# Patient Record
Sex: FEMALE | Race: BLACK OR AFRICAN AMERICAN | Employment: UNEMPLOYED | URBAN - METROPOLITAN AREA
[De-identification: names, ages, dates, MRNs, and addresses within clinical notes are randomized per-mention and may not be internally consistent; named-entity substitution may affect disease eponyms.]

---

## 2017-01-04 ENCOUNTER — OFFICE VISIT (OUTPATIENT)
Dept: FAMILY MEDICINE CLINIC | Age: 62
End: 2017-01-04

## 2017-01-04 ENCOUNTER — HOSPITAL ENCOUNTER (OUTPATIENT)
Dept: LAB | Age: 62
Discharge: HOME | End: 2017-01-04

## 2017-01-04 VITALS
TEMPERATURE: 97.8 F | HEIGHT: 61 IN | SYSTOLIC BLOOD PRESSURE: 119 MMHG | BODY MASS INDEX: 22.09 KG/M2 | DIASTOLIC BLOOD PRESSURE: 65 MMHG | WEIGHT: 117 LBS | HEART RATE: 76 BPM

## 2017-01-04 DIAGNOSIS — I10 ESSENTIAL HYPERTENSION: ICD-10-CM

## 2017-01-04 DIAGNOSIS — R73.9 ELEVATED BLOOD SUGAR: Primary | ICD-10-CM

## 2017-01-04 LAB
EST. AVERAGE GLUCOSE BLD GHB EST-MCNC: 240 MG/DL
GLUCOSE POC: NORMAL MG/DL
HBA1C MFR BLD: 10 % (ref 4.2–6.3)

## 2017-01-04 PROCEDURE — 83036 HEMOGLOBIN GLYCOSYLATED A1C: CPT

## 2017-01-04 NOTE — PATIENT INSTRUCTIONS
1. Your Insulin dose is increased to 54 units. Use 54 units of Levemir daily around the same time  2. Log in your glucose level and bring the chart on you next visit. Log in fasting glucose level for the several days, then log in glucose level after meal for several days and log in your blood glucose level before bedtime for several days.

## 2017-01-04 NOTE — PROGRESS NOTES
Carrie Jacinto is an 57 y.o. female  Who presents for follow up for diabetes and hypertension. The patient is a  [place] speaking. The patient's son is present during the examination and interprets the conversation. The patient brought her glucose log with fasting glucose in 140-200s. The patient's daughter-in-law is usually administers the insulin. The pt reports using Insulin not as scheduled (sometimes in the morning, sometimes in the afternoon). She checks her blood glucose randomly. The follows diabetic diet and doing OK overall. The pt has blood pressure cath at home, reports checking her blood pressure \" sometimes and does not remember the numbers\". The patient reports feeling OK. Allergies - reviewed:   No Known Allergies      Medications - reviewed:   Current Outpatient Prescriptions   Medication Sig    Insulin Syringe-Needle U-100 1 mL 31 gauge x 5/16 syrg Use to administer insulin as directed.  zolpidem (AMBIEN) 5 mg tablet TAKE ONE TABLET BY MOUTH NIGHTLY AS NEEDED FOR SLEEP**MAX DAILY AMOUNT: 5 MG**    meclizine (ANTIVERT) 25 mg tablet Take 1 Tab by mouth three (3) times daily as needed. For dizziness.  metFORMIN ER (GLUCOPHAGE XR) 500 mg tablet Take 2 Tabs by mouth daily.  lisinopril-hydrochlorothiazide (PRINZIDE, ZESTORETIC) 20-25 mg per tablet Take 2 Tabs by mouth daily.  omeprazole (PRILOSEC) 20 mg capsule Take 1 Cap by mouth daily.  HYDROcodone-acetaminophen (NORCO) 5-325 mg per tablet Take 1 Tab by mouth every four (4) hours as needed for Pain. Max Daily Amount: 6 Tabs.  naproxen (NAPROSYN) 500 mg tablet Take 1 Tab by mouth two (2) times daily (with meals).  ondansetron hcl (ZOFRAN, AS HYDROCHLORIDE,) 8 mg tablet Take 1 Tab by mouth every eight (8) hours as needed for Nausea.  insulin detemir (LEVEMIR) 100 unit/mL (3 mL) pen by SubCUTAneous route. Take 54 units daily at noon. No current facility-administered medications for this visit. Past Medical History - reviewed:  Past Medical History   Diagnosis Date    Diabetes ([facility] [address]     Hypertension          Past Surgical History - reviewed:   No past surgical history on file. Social History - reviewed:  Social History     Social History    Marital status:      Spouse name: N/A    Number of children: N/A    Years of education: N/A     Occupational History    Not on file. Social History Main Topics    Smoking status: Never Smoker    Smokeless tobacco: Not on file    Alcohol use No    Drug use: No    Sexual activity: Not on file     Other Topics Concern    Not on file     Social History Narrative         Family History - reviewed:  No family history on file. Immunizations - reviewed:   Immunization History   Administered Date(s) Administered    Influenza Vaccine 11/18/2010, 12/09/2011, 10/02/2012, 01/28/2014    Influenza Vaccine (Quad) PF 11/23/2015    Influenza Vaccine PF 01/21/2015         ROS  Review of Systems : negative unless highlighted  CONSTITUTIONAL: fevers. Chills. Anorexia. Weight loss. Weight gain. EYES: diplopia. Blurry vision. Visual changes  CARDIOVASCULAR: chest pain. palpitations  RESPIRATORY: dyspnea. Cough. Wheeze. : dysuria. Hematuria. Urgency. Itching. Burning. Discharge. NEURO: numbness. Tingling. Weakness. Physical Exam  Visit Vitals    /65 (BP 1 Location: Left arm, BP Patient Position: Sitting)    Pulse 76    Temp 97.8 °F (36.6 °C) (Oral)    Ht 5' 0.98\" (1.549 m)    Wt 117 lb (53.1 kg)    BMI 22.12 kg/m2       General appearance - NAD. Appropriately conversational    Eyes: EOMI. Sclera white. Respiratory - LCTAB. No wheeze/rale/rhonchi  Heart - Normal rate, regular rhythm. No m/r/r  Abdomen - Soft, non tender. Non distended. Neurological - No focal deficits. Speech normal.   Musculoskeletal - Normal ROM, Gait normal.    Extremities - No LE edema. Distal pulses intact  Skin - normal coloration and normal turgor. No cyanosis, no rash. Assessment/Plan  1. Diabetes mellitus  -Will check HgA1C    -The Levemir was increased to 54 units  -The pt was advised to log in the glucose numbers in the morning, before and after meals and before bedtime until next appointment    2. Hypertension  -Continue Prinzide 20-25 mg : 2 tablets daily        Follow-up Disposition:  Return in about 3 months (around 4/4/2017) for Diabetes follow up. I discussed the aforementioned diagnoses with the patient as well as the plan of care.      The patient was seen and discussed with Dr. Jorge Méndez (The attending physician)    Krystina Downs MD  Family Medicine Resident  PGY 1

## 2017-01-04 NOTE — MR AVS SNAPSHOT
Visit Information Date & Time Provider Department Dept. Phone Encounter #  
 1/4/2017 11:10 AM Broderick Kim MD [facility] [phone] [id number] Follow-up Instructions Return in about 3 months (around 4/4/2017) for Diabetes follow up. Upcoming Health Maintenance Date Due  
 EYE EXAM RETINAL OR DILATED Q1 6/5/1964 Pneumococcal 19-64 Medium Risk (1 of 1 - PPSV23) 6/5/1973 DTaP/Tdap/Td series (1 - Tdap) 6/5/1975 PAP AKA CERVICAL CYTOLOGY 6/5/1975 ZOSTER VACCINE AGE 60> 6/5/2014 FOBT Q 1 YEAR AGE 50-75 3/12/2015 FOOT EXAM Q1 8/28/2015 BREAST CANCER SCRN MAMMOGRAM 2/25/2016 INFLUENZA AGE 9 TO ADULT 8/1/2016 HEMOGLOBIN A1C Q6M 2/19/2017 MICROALBUMIN Q1 5/11/2017 LIPID PANEL Q1 8/19/2017 Allergies as of 1/4/2017  Review Complete On: 1/4/2017 By: Sierra Davis No Known Allergies Current Immunizations  Reviewed on 11/23/2015 Name Date Influenza Vaccine 1/28/2014 11:20 AM, 10/2/2012, 12/9/2011, 11/18/2010 Influenza Vaccine (Quad) PF 11/23/2015 Influenza Vaccine PF 1/21/2015 Not reviewed this visit You Were Diagnosed With   
  
 Codes Comments Elevated blood sugar    -  Primary ICD-10-CM: R73.9 ICD-9-CM: 790.29 Uncontrolled type 2 diabetes mellitus without complication, with long-term current use of insulin (HCC)     ICD-10-CM: E11.65, Z79.4 ICD-9-CM: 250.02, V58.67 Essential hypertension     ICD-10-CM: I10 
ICD-9-CM: 401.9 Vitals BP Pulse Temp Height(growth percentile) Weight(growth percentile) BMI  
 119/65 (BP 1 Location: Left arm, BP Patient Position: Sitting) 76 97.8 °F (36.6 °C) (Oral) 5' 0.98\" (1.549 m) 117 lb (53.1 kg) 22.12 kg/m2 OB Status Smoking Status Postmenopausal Never Smoker Vitals History BMI and BSA Data Body Mass Index Body Surface Area  
 22.12 kg/m 2 1.51 m 2 Preferred Pharmacy Pharmacy Name Phone [phone] [facility] [phone] Your Updated Medication List  
  
   
This list is accurate as of: 1/4/17 12:10 PM.  Always use your most recent med list.  
  
  
  
  
 HYDROcodone-acetaminophen 5-325 mg per tablet Commonly known as:  Benetta Pock Take 1 Tab by mouth every four (4) hours as needed for Pain. Max Daily Amount: 6 Tabs. insulin detemir 100 unit/mL (3 mL) Inpn Commonly known as:  LEVEMIR FLEXTOUCH  
by SubCUTAneous route. Take 54 units daily at noon. Insulin Syringe-Needle U-100 1 mL 31 gauge x 5/16 Syrg Use to administer insulin as directed. lisinopril-hydroCHLOROthiazide 20-25 mg per tablet Commonly known as:  Donnamarie Parrot Take 2 Tabs by mouth daily. meclizine 25 mg tablet Commonly known as:  ANTIVERT Take 1 Tab by mouth three (3) times daily as needed. For dizziness. metFORMIN  mg tablet Commonly known as:  GLUCOPHAGE XR Take 2 Tabs by mouth daily. naproxen 500 mg tablet Commonly known as:  NAPROSYN Take 1 Tab by mouth two (2) times daily (with meals). omeprazole 20 mg capsule Commonly known as:  PRILOSEC Take 1 Cap by mouth daily. ondansetron hcl 8 mg tablet Commonly known as:  ZOFRAN (AS HYDROCHLORIDE) Take 1 Tab by mouth every eight (8) hours as needed for Nausea. zolpidem 5 mg tablet Commonly known as:  AMBIEN  
TAKE ONE TABLET BY MOUTH NIGHTLY AS NEEDED FOR SLEEP**MAX DAILY AMOUNT: 5 MG** Follow-up Instructions Return in about 3 months (around 4/4/2017) for Diabetes follow up. To-Do List   
 01/04/2017 Lab:  HEMOGLOBIN A1C WITH EAG Patient Instructions 1. Your Insulin dose is increased to 54 units. Use 54 units of Levemir daily around the same time 2. Log in your glucose level and bring the chart on you next visit.  Log in fasting glucose level for the several days, then log in glucose level after meal for several days and log in your blood glucose level before bedtime for several days. Introducing [facility] & HEALTH SERVICES! [address] introduces [employer] patient portal. Now you can access parts of your medical record, email your doctor's office, and request medication refills online. 1. In your internet browser, go to [email or web address] 2. Click on the First Time User? Click Here link in the Sign In box. You will see the New Member Sign Up page. 3. Enter your Megadyne Access Code exactly as it appears below. You will not need to use this code after youve completed the sign-up process. If you do not sign up before the expiration date, you must request a new code. · Meg[employer]ne Access Code: [email or web address] Expires: 4/4/2017 12:01 PM 
 
4. Enter the last four digits of your Social Security Number (xxxx) and Date of Birth (mm/dd/yyyy) as indicated and click Submit. You will be taken to the next sign-up page. 5. Create a Megadyne ID. This will be your Megadyne login ID and cannot be changed, so think of one that is secure and easy to remember. 6. Create a Megadyne password. You can change your password at any time. 7. Enter your Password Reset Question and Answer. This can be used at a later time if you forget your password. 8. Enter your e-mail address. You will receive e-mail notification when new information is available in [address] 9. Click Sign Up. You can now view and download portions of your medical record. 10. Click the Download Summary menu link to download a portable copy of your medical information. If you have questions, please visit the Frequently Asked Questions section of the Megadyne website. Remember, Megadyne is NOT to be used for urgent needs. For medical emergencies, dial 911. Now available from your iPhone and Android! Please provide this summary of care documentation to your next provider. Your primary care clinician is listed as Millie Herrera. If you have any questions after today's visit, please call [phone].

## 2017-01-04 NOTE — PROGRESS NOTES
Coordination of Care  1. Have you been to the ER, urgent care clinic since your last visit? Hospitalized since your last visit? No    2. Have you seen or consulted any other health care providers outside of the [address] [place] [address] since your last visit? Include any pap smears or colon screening.  No    Medications  Medication Reconciliation Performed: no  Patient does not know need refills     Learning Assessment Complete? yes  Results for orders placed or performed in visit on 01/04/17   AMB POC GLUCOSE BLOOD, BY GLUCOSE MONITORING DEVICE   Result Value Ref Range    Glucose POC non fasting 167 mg/dL

## 2017-01-05 NOTE — PROGRESS NOTES
I saw and evaluated the patient, performing the key elements of the service. I discussed the findings, assessment and plan with the resident and agree with the resident's findings and plan as documented in the resident's note. Apparently with her daughter and son in law working it is difficult to give her insulin at any regular time. They will try to give as close to AM as possible. The patient is not able to give herself insulin.

## 2017-01-06 NOTE — PROGRESS NOTES
HgA1C improved since last time. Levemir was increased to 54 units, will recheck HgA1C on next visit.

## 2017-04-03 ENCOUNTER — CLINICAL SUPPORT (OUTPATIENT)
Dept: FAMILY MEDICINE CLINIC | Age: 62
End: 2017-04-03

## 2017-05-03 ENCOUNTER — OFFICE VISIT (OUTPATIENT)
Dept: FAMILY MEDICINE CLINIC | Age: 62
End: 2017-05-03

## 2017-05-03 VITALS
BODY MASS INDEX: 22.09 KG/M2 | HEART RATE: 73 BPM | DIASTOLIC BLOOD PRESSURE: 60 MMHG | TEMPERATURE: 98 F | HEIGHT: 61 IN | SYSTOLIC BLOOD PRESSURE: 123 MMHG | WEIGHT: 117 LBS

## 2017-05-03 DIAGNOSIS — I10 ESSENTIAL HYPERTENSION WITH GOAL BLOOD PRESSURE LESS THAN 140/90: ICD-10-CM

## 2017-05-03 DIAGNOSIS — Z79.4 CONTROLLED TYPE 2 DIABETES MELLITUS WITHOUT COMPLICATION, WITH LONG-TERM CURRENT USE OF INSULIN (HCC): ICD-10-CM

## 2017-05-03 DIAGNOSIS — E11.9 CONTROLLED TYPE 2 DIABETES MELLITUS WITHOUT COMPLICATION, WITH LONG-TERM CURRENT USE OF INSULIN (HCC): ICD-10-CM

## 2017-05-03 DIAGNOSIS — E16.2 HYPOGLYCEMIA: Primary | ICD-10-CM

## 2017-05-03 LAB — GLUCOSE POC: NORMAL MG/DL

## 2017-05-03 NOTE — PROGRESS NOTES
Coordination of Care  1. Have you been to the ER, urgent care clinic since your last visit? Hospitalized since your last visit? No    2. Have you seen or consulted any other health care providers outside of the [address] [place] [address] since your last visit? Include any pap smears or colon screening.  No    Medications  Medication Reconciliation Performed: yes  Patient does need refills     Learning Assessment Complete? yes  Results for orders placed or performed in visit on 05/03/17   AMB POC GLUCOSE BLOOD, BY GLUCOSE MONITORING DEVICE   Result Value Ref Range    Glucose  NF mg/dL

## 2017-05-03 NOTE — MR AVS SNAPSHOT
Visit Information Date & Time Provider Department Dept. Phone Encounter #  
 5/3/2017 10:30 AM Boris Candelario [facility] [phone] [id number] Follow-up Instructions Return in about 1 month (around 6/3/2017). Upcoming Health Maintenance Date Due  
 EYE EXAM RETINAL OR DILATED Q1 6/5/1964 Pneumococcal 19-64 Medium Risk (1 of 1 - PPSV23) 6/5/1973 DTaP/Tdap/Td series (1 - Tdap) 6/5/1975 PAP AKA CERVICAL CYTOLOGY 6/5/1975 FOBT Q 1 YEAR AGE 50-75 6/5/2004 ZOSTER VACCINE AGE 60> 6/5/2014 FOOT EXAM Q1 8/28/2015 BREAST CANCER SCRN MAMMOGRAM 2/25/2016 MICROALBUMIN Q1 5/11/2017 HEMOGLOBIN A1C Q6M 7/4/2017 INFLUENZA AGE 9 TO ADULT 8/1/2017 LIPID PANEL Q1 8/19/2017 Allergies as of 5/3/2017  Review Complete On: 5/3/2017 By: Ramesh Palomares MD  
 No Known Allergies Current Immunizations  Reviewed on 11/23/2015 Name Date Influenza Vaccine 1/28/2014 11:20 AM, 10/2/2012, 12/9/2011, 11/18/2010 Influenza Vaccine (Quad) PF 11/23/2015 Influenza Vaccine PF 1/21/2015 Not reviewed this visit You Were Diagnosed With   
  
 Codes Comments Controlled type 2 diabetes mellitus without complication, with long-term current use of insulin (HCC)    -  Primary ICD-10-CM: E11.9, Z79.4 ICD-9-CM: 250.00, V58.67 Essential hypertension with goal blood pressure less than 140/90     ICD-10-CM: I10 
ICD-9-CM: 401.9 Vitals BP Pulse Temp Height(growth percentile) Weight(growth percentile) BMI  
 123/60 (BP 1 Location: Left arm) 73 98 °F (36.7 °C) (Oral) 5' 0.98\" (1.549 m) 117 lb (53.1 kg) 22.12 kg/m2 OB Status Smoking Status Postmenopausal Never Smoker Vitals History BMI and BSA Data Body Mass Index Body Surface Area  
 22.12 kg/m 2 1.51 m 2 Preferred Pharmacy Pharmacy Name Phone [facility] [phone] [facility] [phone] Your Updated Medication List  
  
   
This list is accurate as of: 5/3/17 11:21 AM.  Always use your most recent med list.  
  
  
  
  
 HYDROcodone-acetaminophen 5-325 mg per tablet Commonly known as:  Milinda Copier Take 1 Tab by mouth every four (4) hours as needed for Pain. Max Daily Amount: 6 Tabs. insulin detemir 100 unit/mL (3 mL) Inpn Commonly known as:  LEVEMIR FLEXTOUCH  
by SubCUTAneous route. Take 54 units daily at noon. Insulin Syringe-Needle U-100 1 mL 31 gauge x 5/16 Syrg Use to administer insulin as directed. lisinopril-hydroCHLOROthiazide 20-25 mg per tablet Commonly known as:  Louretta  Take 2 Tabs by mouth daily. meclizine 25 mg tablet Commonly known as:  ANTIVERT Take 1 Tab by mouth three (3) times daily as needed. For dizziness. metFORMIN  mg tablet Commonly known as:  GLUCOPHAGE XR Take 2 Tabs by mouth daily. naproxen 500 mg tablet Commonly known as:  NAPROSYN Take 1 Tab by mouth two (2) times daily (with meals). omeprazole 20 mg capsule Commonly known as:  PRILOSEC Take 1 Cap by mouth daily. ondansetron hcl 8 mg tablet Commonly known as:  ZOFRAN (AS HYDROCHLORIDE) Take 1 Tab by mouth every eight (8) hours as needed for Nausea. zolpidem 5 mg tablet Commonly known as:  AMBIEN  
TAKE ONE TABLET BY MOUTH NIGHTLY AS NEEDED FOR SLEEP**MAX DAILY AMOUNT: 5 MG** We Performed the Following AMB POC GLUCOSE BLOOD, BY GLUCOSE MONITORING DEVICE [40241 CPT(R)] Follow-up Instructions Return in about 1 month (around 6/3/2017). Introducing [facility] & HEALTH SERVICES! [facility] introduces [employer] patient portal. Now you can access parts of your medical record, email your doctor's office, and request medication refills online. 1. In your internet browser, go to [email or web address] 2. Click on the First Time User? Click Here link in the Sign In box.  You will see the New Member Sign Up page. 3. Enter your [employer] Access Code exactly as it appears below. You will not need to use this code after youve completed the sign-up process. If you do not sign up before the expiration date, you must request a new code. · [employer] Access Code: [email or web address] Expires: 8/1/2017 11:20 AM 
 
4. Enter the last four digits of your Social Security Number (xxxx) and Date of Birth (mm/dd/yyyy) as indicated and click Submit. You will be taken to the next sign-up page. 5. Create a [employer] ID. This will be your [employer]Green login ID and cannot be changed, so think of one that is secure and easy to remember. 6. Create a [employer] password. You can change your password at any time. 7. Enter your Password Reset Question and Answer. This can be used at a later time if you forget your password. 8. Enter your e-mail address. You will receive e-mail notification when new information is available in [address] 9. Click Sign Up. You can now view and download portions of your medical record. 10. Click the Download Summary menu link to download a portable copy of your medical information. If you have questions, please visit the Frequently Asked Questions section of the [employer] website. Remember, [employer] is NOT to be used for urgent needs. For medical emergencies, dial 911. Now available from your iPhone and Android! Please provide this summary of care documentation to your next provider. Your primary care clinician is listed as Emil Puente. If you have any questions after today's visit, please call [phone].

## 2017-05-03 NOTE — PROGRESS NOTES
Subjective:     Karissa Jorgensen is a 57 y.o. female who presents for follow up of diabetes and hypertension. She is accompanied by her daughter who is very involved in her care and interprets for her. New concerns: Has had 2 episodes of hypoglycemia in the early AM just recently. May have been due to not eating as much in evening before. Once she fell into the floor while arising from a chair. No LOC but hit right side of head. Has not noticed any neurological deficits since but has had some headache. Patient Active Problem List   Diagnosis Code    Diabetes mellitus type 2, controlled ([facility] [address] E11.9    Illiteracy Z55.0    Dyspepsia R10.13    Osteoarthritis of right knee M17.11    Essential hypertension with goal blood pressure less than 140/90 I10       Current Outpatient Prescriptions   Medication Sig    Insulin Syringe-Needle U-100 1 mL 31 gauge x 5/16 syrg Use to administer insulin as directed.  zolpidem (AMBIEN) 5 mg tablet TAKE ONE TABLET BY MOUTH NIGHTLY AS NEEDED FOR SLEEP**MAX DAILY AMOUNT: 5 MG**    metFORMIN ER (GLUCOPHAGE XR) 500 mg tablet Take 2 Tabs by mouth daily.  lisinopril-hydrochlorothiazide (PRINZIDE, ZESTORETIC) 20-25 mg per tablet Take 2 Tabs by mouth daily.  omeprazole (PRILOSEC) 20 mg capsule Take 1 Cap by mouth daily.  insulin detemir (LEVEMIR) 100 unit/mL (3 mL) pen by SubCUTAneous route. Take 54 units daily at noon.  meclizine (ANTIVERT) 25 mg tablet Take 1 Tab by mouth three (3) times daily as needed. For dizziness.  HYDROcodone-acetaminophen (NORCO) 5-325 mg per tablet Take 1 Tab by mouth every four (4) hours as needed for Pain. Max Daily Amount: 6 Tabs.  naproxen (NAPROSYN) 500 mg tablet Take 1 Tab by mouth two (2) times daily (with meals).  ondansetron hcl (ZOFRAN, AS HYDROCHLORIDE,) 8 mg tablet Take 1 Tab by mouth every eight (8) hours as needed for Nausea. No current facility-administered medications for this visit.         No Known Allergies    Diet and Lifestyle: does not rigorously follow a diabetic diet      AM glucoses:  147 - 190  Post Prandial glucoses:  197 - 267  Evening glucoses:  not checking    BP readings outside office:  125/60    Cardiovascular ROS: taking medications as instructed, no medication side effects noted, no TIA's, no chest pain on exertion, no dyspnea on exertion, no swelling of ankles. Review of Systems, additional:  Pertinent items are noted in HPI. No past surgical history on file. No family history on file. Social History   Substance Use Topics    Smoking status: Never Smoker    Smokeless tobacco: Not on file    Alcohol use No          Objective:     Visit Vitals    /60 (BP 1 Location: Left arm)    Pulse 73    Temp 98 °F (36.7 °C) (Oral)    Ht 5' 0.98\" (1.549 m)    Wt 117 lb (53.1 kg)    BMI 22.12 kg/m2     Body mass index is 22.12 kg/(m^2). Physical Exam  General appearance: alert, cooperative, no distress, appears stated age  Head: no swelling, bruising or deformity. Tender over right parietal area.     Lungs: clear to auscultation bilaterally, no wheezes, no increased work of breathing  Heart: regular rate and rhythm, S1, S2 normal, no murmur, click, rub or gallop  Extremities: extremities normal, no cyanosis or edema  Skin: color, texture, turgor normal. No rashes or lesions  Neurologic: Alert and oriented, grossly normal exam. Normal coordination and gait      Lab results below reviewed at this visit:  Results for orders placed or performed in visit on 05/03/17   AMB POC GLUCOSE BLOOD, BY GLUCOSE MONITORING DEVICE   Result Value Ref Range    Glucose  NF mg/dL       Lab Results   Component Value Date/Time    Hemoglobin A1c 10.0 01/04/2017 12:17 PM       Lab Results   Component Value Date/Time    Sodium 142 08/19/2016 11:06 AM    Potassium 4.6 08/19/2016 11:06 AM    Chloride 105 08/19/2016 11:06 AM    CO2 27 08/19/2016 11:06 AM    Anion gap 10 08/19/2016 11:06 AM    Glucose 144 08/19/2016 11:06 AM    BUN 19 08/19/2016 11:06 AM    Creatinine 0.92 08/19/2016 11:06 AM    BUN/Creatinine ratio 21 08/19/2016 11:06 AM    GFR est AA >60 08/19/2016 11:06 AM    GFR est non-AA >60 08/19/2016 11:06 AM    Calcium 8.6 08/19/2016 11:06 AM       Lab Results   Component Value Date/Time    Microalbumin/Creat ratio (mg/g creat) 7 05/11/2016 10:27 AM    Microalbumin,urine random 0.84 05/11/2016 10:27 AM       Lab Results   Component Value Date/Time    Cholesterol, total 134 08/19/2016 11:06 AM    HDL Cholesterol 31 08/19/2016 11:06 AM    LDL,Direct 119 01/25/2013 03:10 PM    LDL, calculated 83 08/19/2016 11:06 AM    VLDL, calculated 20 08/19/2016 11:06 AM    Triglyceride 100 08/19/2016 11:06 AM    CHOL/HDL Ratio 4.3 08/19/2016 11:06 AM         Assessment/Plan:       ICD-10-CM ICD-9-CM    1. Controlled type 2 diabetes mellitus without complication, with long-term current use of insulin (HCC) E11.9 250.00 AMB POC GLUCOSE BLOOD, BY GLUCOSE MONITORING DEVICE    Z79.4 V58.67    2. Essential hypertension with goal blood pressure less than 140/90 I10 401.9        We discussed the danger of low glucose and causes and the fact that her diabetes overall is not well controlled. To pay careful attention to her eating and watch for low glucose when her appetite drops as this may well be the cause. Discussed the disadvantage of only taking once a day long acting insulin. Discussed with daughter that taking small amounts of fast acting insulin before meals is recommended along with long acting but the daughter is the only person apparently capable / willing to give her injections and she works and is gone much of the day and evening. This really limits our options. Try to get some evening glucose readings. Follow-up Disposition:  Return in about 1 month (around 6/3/2017).

## 2017-06-08 ENCOUNTER — OFFICE VISIT (OUTPATIENT)
Dept: FAMILY MEDICINE CLINIC | Age: 62
End: 2017-06-08

## 2017-06-08 ENCOUNTER — HOSPITAL ENCOUNTER (OUTPATIENT)
Dept: LAB | Age: 62
Discharge: HOME | End: 2017-06-08

## 2017-06-08 VITALS
HEART RATE: 82 BPM | DIASTOLIC BLOOD PRESSURE: 67 MMHG | SYSTOLIC BLOOD PRESSURE: 120 MMHG | TEMPERATURE: 98.3 F | WEIGHT: 121 LBS | BODY MASS INDEX: 22.87 KG/M2

## 2017-06-08 DIAGNOSIS — I10 ESSENTIAL HYPERTENSION WITH GOAL BLOOD PRESSURE LESS THAN 140/90: ICD-10-CM

## 2017-06-08 DIAGNOSIS — Z55.0 ILLITERACY: ICD-10-CM

## 2017-06-08 DIAGNOSIS — E16.2 HYPOGLYCEMIA: ICD-10-CM

## 2017-06-08 DIAGNOSIS — Z23 ENCOUNTER FOR IMMUNIZATION: ICD-10-CM

## 2017-06-08 LAB
CREAT UR-MCNC: 148 MG/DL
EST. AVERAGE GLUCOSE BLD GHB EST-MCNC: 229 MG/DL
GLUCOSE POC: NORMAL MG/DL
HBA1C MFR BLD: 9.6 % (ref 4.2–6.3)
MICROALBUMIN UR-MCNC: 1.03 MG/DL
MICROALBUMIN/CREAT UR-RTO: 7 MG/G (ref 0–30)

## 2017-06-08 PROCEDURE — 82043 UR ALBUMIN QUANTITATIVE: CPT

## 2017-06-08 PROCEDURE — 83036 HEMOGLOBIN GLYCOSYLATED A1C: CPT

## 2017-06-08 SDOH — EDUCATIONAL SECURITY - EDUCATION ATTAINMENT: ILITERACY AND LOW LEVEL LITERACY: Z55.0

## 2017-06-08 NOTE — PROGRESS NOTES
Subjective:     Ronit Tolbert is a 60 y.o. female who presents for follow up of diabetes and hypertension. She is a Western Domi speaker. ls accompanied by her son in law. She says some days she takes her insulin in the AM and sometimes in evening. Morning would be the best as her daughter who usually gives her the insulin is there. Some days she gives herself her shot. She says her appetite is the same most days but she may eat differently some days. Has not had any hypoglycemic symptoms since last visit. Feels well today despite her glucose being low. Denies any symptoms of low glucose. Patient Active Problem List   Diagnosis Code    Diabetes mellitus type 2, controlled ([facility] [address] [facility] E11.9    Illiteracy Z55.0    Dyspepsia R10.13    Osteoarthritis of right knee M17.11    Essential hypertension with goal blood pressure less than 140/90 I10       Current Outpatient Prescriptions   Medication Sig    Insulin Syringe-Needle U-100 1 mL 31 gauge x 5/16 syrg Use to administer insulin as directed.  zolpidem (AMBIEN) 5 mg tablet TAKE ONE TABLET BY MOUTH NIGHTLY AS NEEDED FOR SLEEP**MAX DAILY AMOUNT: 5 MG**    metFORMIN ER (GLUCOPHAGE XR) 500 mg tablet Take 2 Tabs by mouth daily.  lisinopril-hydrochlorothiazide (PRINZIDE, ZESTORETIC) 20-25 mg per tablet Take 2 Tabs by mouth daily.  omeprazole (PRILOSEC) 20 mg capsule Take 1 Cap by mouth daily.  insulin detemir (LEVEMIR) 100 unit/mL (3 mL) pen by SubCUTAneous route. Take 54 units daily at noon. No current facility-administered medications for this visit. No Known Allergies    Diet and Lifestyle: follows a diabetic diet regularly      AM glucoses:  76 - 221  Post Prandial glucoses:  not checking  Evening glucoses:  128 - 213    BP readings outside office:  Not checked    Cardiovascular ROS: taking medications as instructed, no medication side effects noted, no TIA's, no chest pain on exertion, no dyspnea on exertion, no swelling of ankles. Review of Systems, additional:  Pertinent items are noted in HPI. No past surgical history on file. No family history on file. Social History   Substance Use Topics    Smoking status: Never Smoker    Smokeless tobacco: Not on file    Alcohol use No          Objective:     Visit Vitals    /67 (BP 1 Location: Left arm, BP Patient Position: Sitting)    Pulse 82    Temp 98.3 °F (36.8 °C) (Oral)    Wt 121 lb (54.9 kg)    BMI 22.87 kg/m2     Body mass index is 22.87 kg/(m^2). Physical Exam  General appearance: alert, cooperative, no distress, appears stated age    Lungs: clear to auscultation bilaterally, no wheezes, no increased work of breathing  Heart: regular rate and rhythm, S1, S2 normal, no murmur, click, rub or gallop  Extremities: extremities normal, no cyanosis or edema. Both feet have good capillary refill although DP pulses are hard to palpate. She seems to have normal sensation, although this is difficult to be entirely sure of due to language barrier.   Skin: color, texture, turgor normal. No rashes or lesions  Neurologic: Alert and oriented, grossly normal exam. Normal coordination and gait      Lab results below reviewed at this visit:  Results for orders placed or performed in visit on 06/08/17   AMB POC GLUCOSE BLOOD, BY GLUCOSE MONITORING DEVICE   Result Value Ref Range    Glucose POC 60 non fasting mg/dL       Lab Results   Component Value Date/Time    Hemoglobin A1c 10.0 01/04/2017 12:17 PM       Lab Results   Component Value Date/Time    Sodium 142 08/19/2016 11:06 AM    Potassium 4.6 08/19/2016 11:06 AM    Chloride 105 08/19/2016 11:06 AM    CO2 27 08/19/2016 11:06 AM    Anion gap 10 08/19/2016 11:06 AM    Glucose 144 08/19/2016 11:06 AM    BUN 19 08/19/2016 11:06 AM    Creatinine 0.92 08/19/2016 11:06 AM    BUN/Creatinine ratio 21 08/19/2016 11:06 AM    GFR est AA >60 08/19/2016 11:06 AM    GFR est non-AA >60 08/19/2016 11:06 AM    Calcium 8.6 08/19/2016 11:06 AM Lab Results   Component Value Date/Time    Microalbumin/Creat ratio (mg/g creat) 7 05/11/2016 10:27 AM    Microalbumin,urine random 0.84 05/11/2016 10:27 AM       Lab Results   Component Value Date/Time    Cholesterol, total 134 08/19/2016 11:06 AM    HDL Cholesterol 31 08/19/2016 11:06 AM    LDL,Direct 119 01/25/2013 03:10 PM    LDL, calculated 83 08/19/2016 11:06 AM    VLDL, calculated 20 08/19/2016 11:06 AM    Triglyceride 100 08/19/2016 11:06 AM    CHOL/HDL Ratio 4.3 08/19/2016 11:06 AM         Assessment/Plan:       ICD-10-CM ICD-9-CM    1. Uncontrolled type 2 diabetes mellitus without complication, with long-term current use of insulin (HCC) E11.65 250.02 AMB POC GLUCOSE BLOOD, BY GLUCOSE MONITORING DEVICE    Z79.4 V58.67 MICROALBUMIN, UR, RAND W/ MICROALBUMIN/CREA RATIO      HEMOGLOBIN A1C WITH EAG      REFERRAL TO OPHTHALMOLOGY   2. Essential hypertension with goal blood pressure less than 140/90 I10 401.9    3. Hypoglycemia E16.2 251.2    4. Illiteracy Z55.0 V62.3    5. Encounter for immunization Z23 V03.89 PNEUMOCOCCAL POLYSACCHARIDE VACCINE, 23-VALENT, ADULT OR IMMUNOSUPPRESSED PT DOSE,       Foot exam done today. Cautioned about need to take insulin at same time every day. Try to eat moderately every meal to prevent hypoglycemia. I suspect her low glucose readings are due to a combination of not taking her insulin at the same time each day and probably eating less calories at certain meals. Overall her diabetes needs some better control but I am concerned about her having low glucose at times. Referred to [facility] for eye exam.  Given pneumococcal vaccine. Given [facility] handout and asked her to schedule a mammogram.    Check lab work. Follow-up Disposition:  Return in about 6 weeks (around 7/20/2017).

## 2017-06-08 NOTE — MR AVS SNAPSHOT
Visit Information Date & Time Provider Department Dept. Phone Encounter #  
 6/8/2017  1:15 PM Jonathon Pace MD [facility] [phone] [id number] Follow-up Instructions Return in about 6 weeks (around 7/20/2017). Upcoming Health Maintenance Date Due  
 EYE EXAM RETINAL OR DILATED Q1 6/5/1964 Pneumococcal 19-64 Medium Risk (1 of 1 - PPSV23) 6/5/1973 DTaP/Tdap/Td series (1 - Tdap) 6/5/1975 PAP AKA CERVICAL CYTOLOGY 6/5/1975 FOBT Q 1 YEAR AGE 50-75 6/5/2004 ZOSTER VACCINE AGE 60> 6/5/2014 BREAST CANCER SCRN MAMMOGRAM 2/25/2016 MICROALBUMIN Q1 5/11/2017 HEMOGLOBIN A1C Q6M 7/4/2017 INFLUENZA AGE 9 TO ADULT 8/1/2017 LIPID PANEL Q1 8/19/2017 FOOT EXAM Q1 6/8/2018 Allergies as of 6/8/2017  Review Complete On: 6/8/2017 By: Jonathon Pace MD  
 No Known Allergies Current Immunizations  Reviewed on 11/23/2015 Name Date Influenza Vaccine 1/28/2014 11:20 AM, 10/2/2012, 12/9/2011, 11/18/2010 Influenza Vaccine (Quad) PF 11/23/2015 Influenza Vaccine PF 1/21/2015 Pneumococcal Polysaccharide (PPSV-23)  Incomplete Not reviewed this visit You Were Diagnosed With   
  
 Codes Comments Uncontrolled type 2 diabetes mellitus without complication, with long-term current use of insulin ([facility]    -  Primary ICD-10-CM: E11.65, Z79.4 ICD-9-CM: 250.02, V58.67 Essential hypertension with goal blood pressure less than 140/90     ICD-10-CM: I10 
ICD-9-CM: 401.9 Hypoglycemia     ICD-10-CM: E16.2 ICD-9-CM: 251.2 Illiteracy     ICD-10-CM: Z55.0 ICD-9-CM: V62.3 Encounter for immunization     ICD-10-CM: P66 ICD-9-CM: V03.89 Vitals BP Pulse Temp Weight(growth percentile) BMI OB Status 120/67 (BP 1 Location: Left arm, BP Patient Position: Sitting) 82 98.3 °F (36.8 °C) (Oral) 121 lb (54.9 kg) 22.87 kg/m2 Postmenopausal  
 Smoking Status Never Smoker Vitals History BMI and BSA Data Body Mass Index Body Surface Area  
 22.87 kg/m 2 1.54 m 2 Preferred Pharmacy Pharmacy Name Phone [facility] [phone] [facility] [phone] Your Updated Medication List  
  
   
This list is accurate as of: 6/8/17  2:08 PM.  Always use your most recent med list.  
  
  
  
  
 insulin detemir 100 unit/mL (3 mL) Inpn Commonly known as:  LEVEMIR FLEXTOUCH  
by SubCUTAneous route. Take 54 units daily at noon. Insulin Syringe-Needle U-100 1 mL 31 gauge x 5/16 Syrg Use to administer insulin as directed. lisinopril-hydroCHLOROthiazide 20-25 mg per tablet Commonly known as:  Gerilyn Puff Take 2 Tabs by mouth daily. metFORMIN  mg tablet Commonly known as:  GLUCOPHAGE XR Take 2 Tabs by mouth daily. omeprazole 20 mg capsule Commonly known as:  PRILOSEC Take 1 Cap by mouth daily. zolpidem 5 mg tablet Commonly known as:  AMBIEN  
TAKE ONE TABLET BY MOUTH NIGHTLY AS NEEDED FOR SLEEP**MAX DAILY AMOUNT: 5 MG** We Performed the Following AMB POC GLUCOSE BLOOD, BY GLUCOSE MONITORING DEVICE [38134 CPT(R)] PNEUMOCOCCAL POLYSACCHARIDE VACCINE, 23-VALENT, ADULT OR IMMUNOSUPPRESSED PT DOSE, [51237 CPT(R)] REFERRAL TO OPHTHALMOLOGY [REF57 Custom] Comments: Through [facility]. Follow-up Instructions Return in about 6 weeks (around 7/20/2017). To-Do List   
 06/08/2017 Lab:  HEMOGLOBIN A1C WITH EAG   
  
 06/08/2017 Lab:  MICROALBUMIN, UR, RAND W/ MICROALBUMIN/CREA RATIO Referral Information Referral ID Referred By Referred To  
  
 [id number] Lendon Opitz Not Available Visits Status Start Date End Date 1 New Request 6/8/17 6/8/18 If your referral has a status of pending review or denied, additional information will be sent to support the outcome of this decision. Patient Instructions I gave a handout to you to help you to schedule a mammogram through the free [facility] program. 
 
 
 
  
Introducing [facility]! Marshall Jordi introduces [employer] patient portal. Now you can access parts of your medical record, email your doctor's office, and request medication refills online. 1. In your internet browser, go to [email or web address] 2. Click on the First Time User? Click Here link in the Sign In box. You will see the New Member Sign Up page. 3. Enter your TechFaith Access Code exactly as it appears below. You will not need to use this code after youve completed the sign-up process. If you do not sign up before the expiration date, you must request a new code. · TechFaith Access Code: [email or web address] Expires: 8/1/2017 11:20 AM 
 
4. Enter the last four digits of your Social Security Number (xxxx) and Date of Birth (mm/dd/yyyy) as indicated and click Submit. You will be taken to the next sign-up page. 5. Create a TechFaith ID. This will be your TechFaith login ID and cannot be changed, so think of one that is secure and easy to remember. 6. Create a TechFaith password. You can change your password at any time. 7. Enter your Password Reset Question and Answer. This can be used at a later time if you forget your password. 8. Enter your e-mail address. You will receive e-mail notification when new information is available in [address] 9. Click Sign Up. You can now view and download portions of your medical record. 10. Click the Download Summary menu link to download a portable copy of your medical information. If you have questions, please visit the Frequently Asked Questions section of the TechFaith website. Remember, TechFaith is NOT to be used for urgent needs. For medical emergencies, dial 911. Now available from your iPhone and Android! Please provide this summary of care documentation to your next provider. Your primary care clinician is listed as Jennifer Barrett. If you have any questions after today's visit, please call [phone].

## 2017-06-08 NOTE — PROGRESS NOTES
Coordination of Care  1. Have you been to the ER, urgent care clinic since your last visit? Hospitalized since your last visit? No    2. Have you seen or consulted any other health care providers outside of the [address] [place] [address] since your last visit? Include any pap smears or colon screening.  No    Medications  Medication Reconciliation Performed: yes  Patient does not need refills     Learning Assessment Complete? yes  Results for orders placed or performed in visit on 06/08/17   AMB POC GLUCOSE BLOOD, BY GLUCOSE MONITORING DEVICE   Result Value Ref Range    Glucose POC 60 non fasting mg/dL

## 2017-06-08 NOTE — PATIENT INSTRUCTIONS
I gave a handout to you to help you to schedule a mammogram through the free [facility] program.

## 2017-07-06 ENCOUNTER — HOSPITAL ENCOUNTER (OUTPATIENT)
Dept: MAMMOGRAPHY | Age: 62
Discharge: HOME | End: 2017-07-06
Attending: NURSE PRACTITIONER

## 2017-07-06 ENCOUNTER — OFFICE VISIT (OUTPATIENT)
Dept: FAMILY PLANNING/WOMEN'S HEALTH CLINIC | Age: 62
End: 2017-07-06

## 2017-07-06 VITALS — DIASTOLIC BLOOD PRESSURE: 81 MMHG | SYSTOLIC BLOOD PRESSURE: 179 MMHG

## 2017-07-06 DIAGNOSIS — Z12.31 SCREENING MAMMOGRAM, ENCOUNTER FOR: Primary | ICD-10-CM

## 2017-07-06 DIAGNOSIS — Z12.31 VISIT FOR SCREENING MAMMOGRAM: ICD-10-CM

## 2017-07-06 PROCEDURE — 77067 SCR MAMMO BI INCL CAD: CPT

## 2017-07-06 NOTE — PROGRESS NOTES
HISTORY OF PRESENT ILLNESS  Esther K. Anita Schilder is a 60 y.o. female. HPI   63yoF  here for [facility] exam. She denies abnormal SBE's, performs intermittently per the daughter. Today is her first mammogram. She is seen at [facility] and has an upcoming appt. If she doesn't have a WWE done at that time, she will return to [facility] for her Pap/pelvic/rectal. Her BP is elevated today but she reports not taking her BP meds this morning. She denies symptoms associated with elevated BP such as HA, CP, dizziness, SOB and visual changes. LMP at age 47. No colonoscopy to date. Review of Systems   Eyes: Negative for blurred vision. Cardiovascular: Negative for chest pain and palpitations. Neurological: Negative for headaches. Physical Exam   Constitutional: She is oriented to person, place, and time. She appears well-developed and well-nourished. Pulmonary/Chest: Right breast exhibits no inverted nipple, no mass, no nipple discharge, no skin change and no tenderness. Left breast exhibits no inverted nipple, no mass, no nipple discharge, no skin change and no tenderness. Breasts are symmetrical.   Lymphadenopathy:     She has no cervical adenopathy. She has no axillary adenopathy. Right: No supraclavicular adenopathy present. Left: No supraclavicular adenopathy present. Neurological: She is alert and oriented to person, place, and time. Skin: Skin is warm and dry. Psychiatric: She has a normal mood and affect. Her behavior is normal. Thought content normal.   Nursing note and vitals reviewed. ASSESSMENT and PLAN  1. [facility] exam  2. CBE benign  3. Screening mammogram today, baseline  4. Post-menopause  5. RTC for Pap/pelvic/rectal if not done at [facility]  6. BP GL's discussed per [facility]      -take BP medications when arriving home      -go to ER/call 911 if becomes symptomatic, s/s disucssed  7.  Colonoscopy GL's discussed

## 2017-07-06 NOTE — PROGRESS NOTES
EVERY WOMANS LIFE HISTORY QUESTIONNAIRE       No Yes Comments   Has a doctor ever seen or felt anything wrong with your breast? [x]                                  []                                     Have you ever had a breast biopsy? [x]                                  []                                          When and where was last mammogram performed? First one    Have you ever been told that there was a problem on your mammogram?   No Yes Comments   []                                  []                                  n/a     Do you have breast implants? No Yes Comments   [x]                                  []                                       When was your last Pap test performed? Never had one    Have you ever had an abnormal Pap test?   No Yes Comments   []                                  []                                  n/a     Have you had a hysterectomy? No Yes Comments (why)   [x]                                  []                                       Have you ever been diagnosed with any type of Cancer   No Yes Comments (type,when,where,type of treatment   [x]                                  []                                          Has a family member been diagnosed with breast or ovarian cancer? No Yes Comments (which family members, and type   [x]                                  []                                       Did your mother take RYAN? No Yes Unknown   [x]                                  []                                       Do you have a history of HIV exposure? No Yes    [x]                                  []                                         Have you been through menopause?    No Yes Date of LMP   [x]                                  []                                  10 yrs ago     Are you taking hormone replacement therapy (HRT)     No Yes Comments   [x]                                  []                                       How many times have you been pregnant? 8      Number of live births ? 7  Are you experiencing any of the following? No Yes Comments   Nipple Discharge [x]                                  []                                     Breast Lump/Masses [x]                                  []                                     Breast Skin Changes [x]                                  []                                          No Yes Comments   Vaginal Discharge [x]                                  []                                     Abnormal/unusual vaginal bleeding [x]                                  []                                         Are you experiencing any other health problems? Diabetes , HTN---followed at [facility]    BP elevated today---has not taken am meds. Encouraged to take as soon as she gets home.

## 2017-07-20 ENCOUNTER — OFFICE VISIT (OUTPATIENT)
Dept: FAMILY MEDICINE CLINIC | Age: 62
End: 2017-07-20

## 2017-07-20 VITALS
BODY MASS INDEX: 23.33 KG/M2 | SYSTOLIC BLOOD PRESSURE: 118 MMHG | TEMPERATURE: 97.7 F | WEIGHT: 123.4 LBS | DIASTOLIC BLOOD PRESSURE: 50 MMHG | HEART RATE: 86 BPM

## 2017-07-20 DIAGNOSIS — E11.9 TYPE 2 DIABETES MELLITUS WITHOUT COMPLICATION, WITH LONG-TERM CURRENT USE OF INSULIN (HCC): Primary | ICD-10-CM

## 2017-07-20 DIAGNOSIS — Z79.4 TYPE 2 DIABETES MELLITUS WITHOUT COMPLICATION, WITH LONG-TERM CURRENT USE OF INSULIN (HCC): Primary | ICD-10-CM

## 2017-07-20 DIAGNOSIS — I10 ESSENTIAL HYPERTENSION WITH GOAL BLOOD PRESSURE LESS THAN 140/90: ICD-10-CM

## 2017-07-20 LAB — GLUCOSE POC: NORMAL MG/DL

## 2017-07-20 RX ORDER — LISINOPRIL AND HYDROCHLOROTHIAZIDE 20; 25 MG/1; MG/1
2 TABLET ORAL DAILY
Qty: 60 TAB | Refills: 5 | Status: SHIPPED | OUTPATIENT
Start: 2017-07-20 | End: 2018-08-17 | Stop reason: SDUPTHER

## 2017-07-20 RX ORDER — METFORMIN HYDROCHLORIDE 500 MG/1
1000 TABLET, EXTENDED RELEASE ORAL DAILY
Qty: 60 TAB | Refills: 5 | Status: SHIPPED | OUTPATIENT
Start: 2017-07-20 | End: 2017-09-20 | Stop reason: SDUPTHER

## 2017-07-20 NOTE — PROGRESS NOTES
PAP medication h/o given to patient granddaughter and explained how the program works and that for refills patient or pt representative must call Jaqueline Galeas at the end of the 2nd month of insulin in order to receive the med in a timely manner to avoid pt running out. One box Levemir flextouch pens (from stock insulin with handful of needles that work with the pens) given to pt per Dr Mykel Jain, lot/exp # recorded on clipboard.

## 2017-07-20 NOTE — MR AVS SNAPSHOT
Visit Information Date & Time Provider Department Dept. Phone Encounter #  
 7/20/2017  1:15 PM Delaney Norton MD [facility] [phone] [id number] Follow-up Instructions Return in about 2 months (around 9/20/2017). Upcoming Health Maintenance Date Due  
 EYE EXAM RETINAL OR DILATED Q1 6/5/1964 DTaP/Tdap/Td series (1 - Tdap) 6/5/1975 PAP AKA CERVICAL CYTOLOGY 6/5/1975 FOBT Q 1 YEAR AGE 50-75 6/5/2004 ZOSTER VACCINE AGE 60> 4/5/2014 LIPID PANEL Q1 8/19/2017 INFLUENZA AGE 9 TO ADULT 8/1/2017 HEMOGLOBIN A1C Q6M 12/8/2017 FOOT EXAM Q1 6/8/2018 MICROALBUMIN Q1 6/8/2018 BREAST CANCER SCRN MAMMOGRAM 7/6/2019 Allergies as of 7/20/2017  Review Complete On: 7/20/2017 By: Delaney Norton MD  
 No Known Allergies Current Immunizations  Reviewed on 6/8/2017 Name Date Influenza Vaccine 1/28/2014 11:20 AM, 10/2/2012, 12/9/2011, 11/18/2010 Influenza Vaccine (Quad) PF 11/23/2015 Influenza Vaccine PF 1/21/2015 Pneumococcal Polysaccharide (PPSV-23) 6/8/2017 Not reviewed this visit You Were Diagnosed With   
  
 Codes Comments Type 2 diabetes mellitus without complication, with long-term current use of insulin (HCC)    -  Primary ICD-10-CM: E11.9, Z79.4 ICD-9-CM: 250.00, V58.67 Essential hypertension with goal blood pressure less than 140/90     ICD-10-CM: I10 
ICD-9-CM: 401.9 Vitals BP Pulse Temp Weight(growth percentile) BMI OB Status 118/50 (BP 1 Location: Left arm, BP Patient Position: Sitting) 86 97.7 °F (36.5 °C) (Oral) 123 lb 6.4 oz (56 kg) 23.33 kg/m2 Postmenopausal  
 Smoking Status Never Smoker Vitals History BMI and BSA Data Body Mass Index Body Surface Area  
 23.33 kg/m 2 1.55 m 2 Preferred Pharmacy Pharmacy Name Phone [facility] [phone] [facility] [phone] Your Updated Medication List  
  
 This list is accurate as of: 7/20/17  2:07 PM.  Always use your most recent med list.  
  
  
  
  
 insulin detemir 100 unit/mL (3 mL) Inpn Commonly known as:  LEVEMIR FLEXTOUCH  
by SubCUTAneous route. Take 54 units daily at noon. Insulin Syringe-Needle U-100 1 mL 31 gauge x 5/16 Syrg Use to administer insulin as directed. lisinopril-hydroCHLOROthiazide 20-25 mg per tablet Commonly known as:  Terryl Range Take 2 Tabs by mouth daily. metFORMIN  mg tablet Commonly known as:  GLUCOPHAGE XR Take 2 Tabs by mouth daily. omeprazole 20 mg capsule Commonly known as:  PRILOSEC Take 1 Cap by mouth daily. zolpidem 5 mg tablet Commonly known as:  AMBIEN  
TAKE ONE TABLET BY MOUTH NIGHTLY AS NEEDED FOR SLEEP**MAX DAILY AMOUNT: 5 MG**  
  
  
  
  
Prescriptions Sent to Pharmacy Refills  
 metFORMIN ER (GLUCOPHAGE XR) 500 mg tablet 5 Sig: Take 2 Tabs by mouth daily. Class: Normal  
 Pharmacy: [address] Ph #: [phone] Route: Oral  
 lisinopril-hydroCHLOROthiazide (PRINZIDE, ZESTORETIC) 20-25 mg per tablet 5 Sig: Take 2 Tabs by mouth daily. Class: Normal  
 Pharmacy: [address] Ph #: [phone] Route: Oral  
  
We Performed the Following AMB POC GLUCOSE BLOOD, BY GLUCOSE MONITORING DEVICE [33890 CPT(R)] Follow-up Instructions Return in about 2 months (around 9/20/2017). Introducing [facility]! Valeria Aponte introduces [employer] patient portal. Now you can access parts of your medical record, email your doctor's office, and request medication refills online. 1. In your internet browser, go to [email or web address] 2. Click on the First Time User? Click Here link in the Sign In box. You will see the New Member Sign Up page. 3. Enter your [email or web address] Access Code exactly as it appears below. You will not need to use this code after youve completed the sign-up process. If you do not sign up before the expiration date, you must request a new code. · [email or web address] Access Code: [email or web address] Expires: 8/1/2017 11:20 AM 
 
4. Enter the last four digits of your Social Security Number (xxxx) and Date of Birth (mm/dd/yyyy) as indicated and click Submit. You will be taken to the next sign-up page. 5. Create a [employer] ID. This will be your [employer] login ID and cannot be changed, so think of one that is secure and easy to remember. 6. Create a [employer] password. You can change your password at any time. 7. Enter your Password Reset Question and Answer. This can be used at a later time if you forget your password. 8. Enter your e-mail address. You will receive e-mail notification when new information is available in [address] 9. Click Sign Up. You can now view and download portions of your medical record. 10. Click the Download Summary menu link to download a portable copy of your medical information. If you have questions, please visit the Frequently Asked Questions section of the [employer] website. Remember, [employer] is NOT to be used for urgent needs. For medical emergencies, dial 911. Now available from your iPhone and Android! Please provide this summary of care documentation to your next provider. Your primary care clinician is listed as Yadira Mcclelland. If you have any questions after today's visit, please call [phone].

## 2017-07-20 NOTE — PROGRESS NOTES
Subjective:     Andres Orozco is a 60 y.o. female who presents for follow up of diabetes and hypertension. She is accompanied today by her granddaughter who states she does not know a lot of details about how she is doing. The patient states she also does not know a lot of details as her daughter does most of her diabetes care. She ran out of her insulin yesterday. Has not reordered from PAP program.  Denies any recent hypoglycemia. Continues to have some intermittent headache and stomach ache and takes Aleve prn. Has run out of omeprazole and has not noted much stomach pain since. Patient Active Problem List   Diagnosis Code    Diabetes mellitus type 2, controlled ([facility] [address] E11.9    Illiteracy Z55.0    Dyspepsia R10.13    Osteoarthritis of right knee M17.11    Essential hypertension with goal blood pressure less than 140/90 I10       Current Outpatient Prescriptions   Medication Sig    Insulin Syringe-Needle U-100 1 mL 31 gauge x 5/16 syrg Use to administer insulin as directed.  zolpidem (AMBIEN) 5 mg tablet TAKE ONE TABLET BY MOUTH NIGHTLY AS NEEDED FOR SLEEP**MAX DAILY AMOUNT: 5 MG**    metFORMIN ER (GLUCOPHAGE XR) 500 mg tablet Take 2 Tabs by mouth daily.  lisinopril-hydrochlorothiazide (PRINZIDE, ZESTORETIC) 20-25 mg per tablet Take 2 Tabs by mouth daily.  omeprazole (PRILOSEC) 20 mg capsule Take 1 Cap by mouth daily.  insulin detemir (LEVEMIR) 100 unit/mL (3 mL) pen by SubCUTAneous route. Take 54 units daily at noon. No current facility-administered medications for this visit. No Known Allergies    Diet and Lifestyle: She does not eat regularly and sometimes eats more and sometimes skips meals. She did not bring any of her glucose log readings. BP readings outside office:  Not checked.     Cardiovascular ROS: taking medications as instructed, no medication side effects noted, no TIA's, no chest pain on exertion, no dyspnea on exertion, no swelling of ankles. Review of Systems, additional:  Pertinent items are noted in HPI. No past surgical history on file. No family history on file. Social History   Substance Use Topics    Smoking status: Never Smoker    Smokeless tobacco: Never Used    Alcohol use No          Objective:     Visit Vitals    /50 (BP 1 Location: Left arm, BP Patient Position: Sitting)    Pulse 86    Temp 97.7 °F (36.5 °C) (Oral)    Wt 123 lb 6.4 oz (56 kg)    BMI 23.33 kg/m2     Body mass index is 23.33 kg/(m^2).     Physical Exam  General appearance: alert, cooperative, no distress, appears stated age    Lungs: clear to auscultation bilaterally, no wheezes, no increased work of breathing  Heart: regular rate and rhythm, S1, S2 normal, no murmur, click, rub or gallop  Extremities: extremities normal, no cyanosis or edema  Skin: color, texture, turgor normal. No rashes or lesions  Neurologic: Alert and oriented, grossly normal exam. Normal coordination and gait      Lab results below reviewed at this visit:  Results for orders placed or performed in visit on 07/20/17   AMB POC GLUCOSE BLOOD, BY GLUCOSE MONITORING DEVICE   Result Value Ref Range    Glucose POC 89 NF mg/dL       Lab Results   Component Value Date/Time    Hemoglobin A1c 9.6 06/08/2017 02:16 PM       Lab Results   Component Value Date/Time    Sodium 142 08/19/2016 11:06 AM    Potassium 4.6 08/19/2016 11:06 AM    Chloride 105 08/19/2016 11:06 AM    CO2 27 08/19/2016 11:06 AM    Anion gap 10 08/19/2016 11:06 AM    Glucose 144 08/19/2016 11:06 AM    BUN 19 08/19/2016 11:06 AM    Creatinine 0.92 08/19/2016 11:06 AM    BUN/Creatinine ratio 21 08/19/2016 11:06 AM    GFR est AA >60 08/19/2016 11:06 AM    GFR est non-AA >60 08/19/2016 11:06 AM    Calcium 8.6 08/19/2016 11:06 AM       Lab Results   Component Value Date/Time    Microalbumin/Creat ratio (mg/g creat) 7 06/08/2017 02:16 PM    Microalbumin,urine random 1.03 06/08/2017 02:16 PM       Lab Results   Component Value Date/Time    Cholesterol, total 134 08/19/2016 11:06 AM    HDL Cholesterol 31 08/19/2016 11:06 AM    LDL,Direct 119 01/25/2013 03:10 PM    LDL, calculated 83 08/19/2016 11:06 AM    VLDL, calculated 20 08/19/2016 11:06 AM    Triglyceride 100 08/19/2016 11:06 AM    CHOL/HDL Ratio 4.3 08/19/2016 11:06 AM         Assessment/Plan:       ICD-10-CM ICD-9-CM    1. Type 2 diabetes mellitus without complication, with long-term current use of insulin (HCC) E11.9 250.00 AMB POC GLUCOSE BLOOD, BY GLUCOSE MONITORING DEVICE    Z79.4 V58.67 metFORMIN ER (GLUCOPHAGE XR) 500 mg tablet   2. Essential hypertension with goal blood pressure less than 140/90 I10 401.9 lisinopril-hydroCHLOROthiazide (PRINZIDE, ZESTORETIC) 20-25 mg per tablet       She no longer seems to have hypoglycemia but judging from her Hgb A1c, her overall glucose control may not be ideal.    Was given samples of her Levemir insulin and Abiodun Radha talked with her granddaughter about reordering her insulin. Discussed with her daughter to have her try to eat more regularly and we discussed that types and serving size of foods she should eat. Asked granddaughter to discuss with the family about having them all come in to see the dietitian about cooking. She said she would pass the message along to the rest of the family. Follow-up Disposition:  Return in about 2 months (around 9/20/2017).

## 2017-07-20 NOTE — PROGRESS NOTES
Results for orders placed or performed in visit on 07/20/17   AMB POC GLUCOSE BLOOD, BY GLUCOSE MONITORING DEVICE   Result Value Ref Range    Glucose POC 89 NF mg/dL

## 2017-08-07 ENCOUNTER — CLINICAL SUPPORT (OUTPATIENT)
Dept: FAMILY MEDICINE CLINIC | Age: 62
End: 2017-08-07

## 2017-08-07 DIAGNOSIS — E11.9 TYPE 2 DIABETES MELLITUS WITHOUT COMPLICATION, WITH LONG-TERM CURRENT USE OF INSULIN (HCC): Primary | ICD-10-CM

## 2017-08-07 DIAGNOSIS — Z79.4 TYPE 2 DIABETES MELLITUS WITHOUT COMPLICATION, WITH LONG-TERM CURRENT USE OF INSULIN (HCC): Primary | ICD-10-CM

## 2017-09-20 ENCOUNTER — OFFICE VISIT (OUTPATIENT)
Dept: FAMILY MEDICINE CLINIC | Age: 62
End: 2017-09-20

## 2017-09-20 VITALS
SYSTOLIC BLOOD PRESSURE: 137 MMHG | DIASTOLIC BLOOD PRESSURE: 80 MMHG | HEART RATE: 84 BPM | WEIGHT: 120 LBS | TEMPERATURE: 97.4 F | BODY MASS INDEX: 22.69 KG/M2

## 2017-09-20 DIAGNOSIS — Z79.4 TYPE 2 DIABETES MELLITUS WITHOUT COMPLICATION, WITH LONG-TERM CURRENT USE OF INSULIN (HCC): ICD-10-CM

## 2017-09-20 DIAGNOSIS — I10 ESSENTIAL HYPERTENSION WITH GOAL BLOOD PRESSURE LESS THAN 140/90: Primary | ICD-10-CM

## 2017-09-20 DIAGNOSIS — E11.9 TYPE 2 DIABETES MELLITUS WITHOUT COMPLICATION, WITH LONG-TERM CURRENT USE OF INSULIN (HCC): ICD-10-CM

## 2017-09-20 LAB — GLUCOSE POC: NORMAL MG/DL

## 2017-09-20 RX ORDER — METFORMIN HYDROCHLORIDE 500 MG/1
TABLET, EXTENDED RELEASE ORAL
Qty: 120 TAB | Refills: 5 | Status: SHIPPED | OUTPATIENT
Start: 2017-09-20 | End: 2018-12-07 | Stop reason: SDUPTHER

## 2017-09-20 NOTE — PROGRESS NOTES
Results for orders placed or performed in visit on 09/20/17   AMB POC GLUCOSE BLOOD, BY GLUCOSE MONITORING DEVICE   Result Value Ref Range    Glucose POC 90 NF mg/dL

## 2017-09-20 NOTE — PROGRESS NOTES
Subjective:     Liza Leslie is a 60 y.o. female seen for follow up of diabetes. She also has hypertension. Diabetic Review of Systems - medication compliance: compliant most of the time, diabetic diet compliance: compliant most of the time, home glucose monitoring: is performed regularly. Son-in-law is here to interpret, daughter sent a message to me stating glucoses continue high and a daily log of fastings averaging around 250 and nf 300s. Takes 54u levemir and 500mg metformin bid. Has spoken with the nutritionist and eats 3x daily a normal amount of food. Other symptoms and concerns: none. Patient Active Problem List   Diagnosis Code    Diabetes mellitus type 2, controlled ([facility] [address] E11.9    Illiteracy Z55.0    Dyspepsia R10.13    Osteoarthritis of right knee M17.11    Essential hypertension with goal blood pressure less than 140/90 I10     No past surgical history on file.      Lab Results  Component Value Date/Time   Cholesterol, total 134 08/19/2016 11:06 AM   HDL Cholesterol 31 08/19/2016 11:06 AM   LDL,Direct 119 01/25/2013 03:10 PM   LDL, calculated 83 08/19/2016 11:06 AM   Triglyceride 100 08/19/2016 11:06 AM   CHOL/HDL Ratio 4.3 08/19/2016 11:06 AM     Lab Results   Component Value Date/Time    Sodium 142 08/19/2016 11:06 AM    Potassium 4.6 08/19/2016 11:06 AM    Chloride 105 08/19/2016 11:06 AM    CO2 27 08/19/2016 11:06 AM    Anion gap 10 08/19/2016 11:06 AM    Glucose 144 08/19/2016 11:06 AM    BUN 19 08/19/2016 11:06 AM    Creatinine 0.92 08/19/2016 11:06 AM    BUN/Creatinine ratio 21 08/19/2016 11:06 AM    GFR est AA >60 08/19/2016 11:06 AM    GFR est non-AA >60 08/19/2016 11:06 AM    Calcium 8.6 08/19/2016 11:06 AM         Lab Results   Component Value Date/Time    Hemoglobin A1c 9.6 06/08/2017 02:16 PM    Hemoglobin A1c 10.0 01/04/2017 12:17 PM    Hemoglobin A1c 12.7 08/19/2016 11:06 AM         Objective:     Vitals 9/20/2017 7/20/2017 7/6/2017 6/8/2017 5/3/2017 1/4/2017 10/12/2016   Blood Pressure 137/80 118/50 179/81 120/67 123/60 119/65 151/68   Pulse 84 86 - 82 73 76 78   Temp 97.4 97.7 - 98.3 98 97.8 98.2   Resp - - - - - - -   Height - - - - 5' 0.984\" 5' 0.984\" -   Weight 120 lb 123 lb 6.4 oz - 121 lb 117 lb 117 lb 117 lb   SpO2 - - - - - - -   BSA - - - - 1.51 m2 1.51 m2 -   BMI - - - - 22.12 kg/m2 22.12 kg/m2 -   BP comment - - - - - - -     Appearance: alert, well appearing, and in no distress. BMI is good. Exam: heart sounds normal rate, regular rhythm, normal S1, S2, no murmurs, rubs, clicks or gallops, chest clear, no hepatosplenomegaly, no carotid bruits  Lab review:    Assessment/Plan:     diabetes poorly controlled, hypertension well controlled. will first increase metformin xr over the next 2 weeks to 1000mg bid. If glucoses continue high, consider increasing the levemir vs changing to bid 70/30. ICD-10-CM ICD-9-CM    1.  Type 2 diabetes mellitus without complication, with long-term current use of insulin (HCC) E11.9 250.00 AMB POC GLUCOSE BLOOD, BY GLUCOSE MONITORING DEVICE    Z79.4 V58.67 metFORMIN ER (GLUCOPHAGE XR) 500 mg tablet

## 2017-09-20 NOTE — PATIENT INSTRUCTIONS
To help the sugars come down, increase the metformin to 1 in the morning and 2 at night for 1 week and then 2 in the morning and 2 at night. It will take several weeks to start to bring down. Then we may still need to increase the lantus or change to another type of insulin.

## 2017-09-20 NOTE — MR AVS SNAPSHOT
Visit Information Date & Time Provider Department Dept. Phone Encounter #  
 9/20/2017  1:15 PM Rachael Drake, [address] [phone] [id number] Upcoming Health Maintenance Date Due DTaP/Tdap/Td series (1 - Tdap) 6/5/1975 PAP AKA CERVICAL CYTOLOGY 6/5/1975 FOBT Q 1 YEAR AGE 50-75 6/5/2004 ZOSTER VACCINE AGE 60> 4/5/2014 INFLUENZA AGE 9 TO ADULT 8/1/2017 LIPID PANEL Q1 8/19/2017 HEMOGLOBIN A1C Q6M 12/8/2017 FOOT EXAM Q1 6/8/2018 MICROALBUMIN Q1 6/8/2018 EYE EXAM RETINAL OR DILATED Q1 7/27/2018 BREAST CANCER SCRN MAMMOGRAM 7/6/2019 Allergies as of 9/20/2017  Review Complete On: 9/20/2017 By: Christian Garcia No Known Allergies Current Immunizations  Reviewed on 6/8/2017 Name Date Influenza Vaccine 1/28/2014 11:20 AM, 10/2/2012, 12/9/2011, 11/18/2010 Influenza Vaccine (Quad) PF 11/23/2015 Influenza Vaccine PF 1/21/2015 Pneumococcal Polysaccharide (PPSV-23) 6/8/2017 Not reviewed this visit You Were Diagnosed With   
  
 Codes Comments Type 2 diabetes mellitus without complication, with long-term current use of insulin (HCC)     ICD-10-CM: E11.9, Z79.4 ICD-9-CM: 250.00, V58.67 Vitals BP Pulse Temp Weight(growth percentile) BMI OB Status 137/80 (BP 1 Location: Left arm, BP Patient Position: Sitting) 84 97.4 °F (36.3 °C) (Oral) 120 lb (54.4 kg) 22.69 kg/m2 Postmenopausal  
 Smoking Status Never Smoker Vitals History BMI and BSA Data Body Mass Index Body Surface Area  
 22.69 kg/m 2 1.53 m 2 Preferred Pharmacy Pharmacy Name Phone [facility] [phone] [phone] Your Updated Medication List  
  
   
This list is accurate as of: 9/20/17  1:46 PM.  Always use your most recent med list.  
  
  
  
  
 insulin detemir 100 unit/mL (3 mL) Inpn Commonly known as:  Courtney Gonsalez  
 by SubCUTAneous route. Take 54 units daily at noon. Indications: PAP medication Insulin Syringe-Needle U-100 1 mL 31 gauge x 5/16 Syrg Use to administer insulin as directed. lisinopril-hydroCHLOROthiazide 20-25 mg per tablet Commonly known as:  Brennan Rl Take 2 Tabs by mouth daily. metFORMIN  mg tablet Commonly known as:  GLUCOPHAGE XR Take 1 in the morning and 2 at night for 1 week and then 2 in the morning and 2 at night. omeprazole 20 mg capsule Commonly known as:  PRILOSEC Take 1 Cap by mouth daily as needed. For stomach pain. zolpidem 5 mg tablet Commonly known as:  AMBIEN  
TAKE ONE TABLET BY MOUTH NIGHTLY AS NEEDED FOR SLEEP**MAX DAILY AMOUNT: 5 MG**  
  
  
  
  
Prescriptions Sent to Pharmacy Refills  
 metFORMIN ER (GLUCOPHAGE XR) 500 mg tablet 5 Sig: Take 1 in the morning and 2 at night for 1 week and then 2 in the morning and 2 at night. Class: Normal  
 [facility] [place] [id number] [address] [address] [facility] [phone] We Performed the Following AMB POC GLUCOSE BLOOD, BY GLUCOSE MONITORING DEVICE [90637 CPT(R)] Patient Instructions To help the sugars come down, increase the metformin to 1 in the morning and 2 at night for 1 week and then 2 in the morning and 2 at night. It will take several weeks to start to bring down. Then we may still need to increase the lantus or change to another type of insulin. Introducing [facility]! Juanjo Marinelli introduces My [employer] patient portal. Now you can access parts of your medical record, email your doctor's office, and request medication refills online. 1. In your internet browser, go to [email or web address] 2. Click on the First Time User? Click Here link in the Sign In box. You will see the New Member Sign Up page. 3. Enter your My [employer] Access Code exactly as it appears below.  You will not need to use this code after youve completed the sign-up process. If you do not sign up before the expiration date, you must request a new code. · [employer] Access Code: [email or web address] Expires: 12/19/2017  1:30 PM 
 
4. Enter the last four digits of your Social Security Number (xxxx) and Date of Birth (mm/dd/yyyy) as indicated and click Submit. You will be taken to the next sign-up page. 5. Create a [employer] ID. This will be your [employer] login ID and cannot be changed, so think of one that is secure and easy to remember. 6. Create a [employer] password. You can change your password at any time. 7. Enter your Password Reset Question and Answer. This can be used at a later time if you forget your password. 8. Enter your e-mail address. You will receive e-mail notification when new information is available in [address] 9. Click Sign Up. You can now view and download portions of your medical record. 10. Click the Download Summary menu link to download a portable copy of your medical information. If you have questions, please visit the Frequently Asked Questions section of the [employer] website. Remember, [employer] is NOT to be used for urgent needs. For medical emergencies, dial 911. Now available from your iPhone and Android! Please provide this summary of care documentation to your next provider. Your primary care clinician is listed as Mary Ann Lisa. If you have any questions after today's visit, please call [phone].

## 2018-05-06 ENCOUNTER — HOSPITAL ENCOUNTER (INPATIENT)
Age: 63
LOS: 3 days | Discharge: HOME HEALTH | DRG: 056 | End: 2018-05-09
Attending: EMERGENCY MEDICINE | Admitting: HOSPITALIST
Payer: SUBSIDIZED

## 2018-05-06 ENCOUNTER — APPOINTMENT (OUTPATIENT)
Dept: CT IMAGING | Age: 63
DRG: 056 | End: 2018-05-06
Attending: EMERGENCY MEDICINE
Payer: SUBSIDIZED

## 2018-05-06 DIAGNOSIS — Z86.73 HISTORY OF CVA (CEREBROVASCULAR ACCIDENT): ICD-10-CM

## 2018-05-06 DIAGNOSIS — R56.9 SEIZURE (HCC): Primary | ICD-10-CM

## 2018-05-06 DIAGNOSIS — G83.84 TODD'S PARALYSIS (POSTEPILEPTIC) (HCC): ICD-10-CM

## 2018-05-06 LAB
ALBUMIN SERPL-MCNC: 3.8 G/DL (ref 3.5–5)
ALBUMIN/GLOB SERPL: 0.8 {RATIO} (ref 1.1–2.2)
ALP SERPL-CCNC: 109 U/L (ref 45–117)
ALT SERPL-CCNC: 37 U/L (ref 12–78)
ANION GAP SERPL CALC-SCNC: 13 MMOL/L (ref 5–15)
APPEARANCE UR: CLEAR
AST SERPL-CCNC: 24 U/L (ref 15–37)
BACTERIA URNS QL MICRO: NEGATIVE /HPF
BASOPHILS # BLD: 0 K/UL (ref 0–0.1)
BASOPHILS NFR BLD: 0 % (ref 0–1)
BILIRUB SERPL-MCNC: 0.3 MG/DL (ref 0.2–1)
BILIRUB UR QL: NEGATIVE
BUN SERPL-MCNC: 20 MG/DL (ref 6–20)
BUN/CREAT SERPL: 14 (ref 12–20)
CALCIUM SERPL-MCNC: 9.2 MG/DL (ref 8.5–10.1)
CHLORIDE SERPL-SCNC: 106 MMOL/L (ref 97–108)
CO2 SERPL-SCNC: 19 MMOL/L (ref 21–32)
COLOR UR: ABNORMAL
CREAT SERPL-MCNC: 1.39 MG/DL (ref 0.55–1.02)
DIFFERENTIAL METHOD BLD: ABNORMAL
EOSINOPHIL # BLD: 0 K/UL (ref 0–0.4)
EOSINOPHIL NFR BLD: 0 % (ref 0–7)
EPITH CASTS URNS QL MICRO: ABNORMAL /LPF
ERYTHROCYTE [DISTWIDTH] IN BLOOD BY AUTOMATED COUNT: 12.8 % (ref 11.5–14.5)
GLOBULIN SER CALC-MCNC: 4.8 G/DL (ref 2–4)
GLUCOSE SERPL-MCNC: 239 MG/DL (ref 65–100)
GLUCOSE UR STRIP.AUTO-MCNC: >1000 MG/DL
HCT VFR BLD AUTO: 46.1 % (ref 35–47)
HGB BLD-MCNC: 15.1 G/DL (ref 11.5–16)
HGB UR QL STRIP: NEGATIVE
HYALINE CASTS URNS QL MICRO: ABNORMAL /LPF (ref 0–5)
IMM GRANULOCYTES # BLD: 0.1 K/UL (ref 0–0.04)
IMM GRANULOCYTES NFR BLD AUTO: 0 % (ref 0–0.5)
KETONES UR QL STRIP.AUTO: NEGATIVE MG/DL
LEUKOCYTE ESTERASE UR QL STRIP.AUTO: NEGATIVE
LYMPHOCYTES # BLD: 3.7 K/UL (ref 0.8–3.5)
LYMPHOCYTES NFR BLD: 31 % (ref 12–49)
MCH RBC QN AUTO: 28.4 PG (ref 26–34)
MCHC RBC AUTO-ENTMCNC: 32.8 G/DL (ref 30–36.5)
MCV RBC AUTO: 86.8 FL (ref 80–99)
MONOCYTES # BLD: 1 K/UL (ref 0–1)
MONOCYTES NFR BLD: 8 % (ref 5–13)
NEUTS SEG # BLD: 7.1 K/UL (ref 1.8–8)
NEUTS SEG NFR BLD: 60 % (ref 32–75)
NITRITE UR QL STRIP.AUTO: NEGATIVE
NRBC # BLD: 0 K/UL (ref 0–0.01)
NRBC BLD-RTO: 0 PER 100 WBC
PH UR STRIP: 5.5 [PH] (ref 5–8)
PLATELET # BLD AUTO: 589 K/UL (ref 150–400)
PMV BLD AUTO: 11 FL (ref 8.9–12.9)
POTASSIUM SERPL-SCNC: 4.2 MMOL/L (ref 3.5–5.1)
PROT SERPL-MCNC: 8.6 G/DL (ref 6.4–8.2)
PROT UR STRIP-MCNC: 30 MG/DL
RBC # BLD AUTO: 5.31 M/UL (ref 3.8–5.2)
RBC #/AREA URNS HPF: ABNORMAL /HPF (ref 0–5)
SODIUM SERPL-SCNC: 138 MMOL/L (ref 136–145)
SP GR UR REFRACTOMETRY: 1.02 (ref 1–1.03)
TROPONIN I SERPL-MCNC: <0.04 NG/ML
UA: UC IF INDICATED,UAUC: ABNORMAL
UROBILINOGEN UR QL STRIP.AUTO: 0.2 EU/DL (ref 0.2–1)
WBC # BLD AUTO: 11.9 K/UL (ref 3.6–11)
WBC URNS QL MICRO: ABNORMAL /HPF (ref 0–4)

## 2018-05-06 PROCEDURE — 99285 EMERGENCY DEPT VISIT HI MDM: CPT

## 2018-05-06 PROCEDURE — 65660000000 HC RM CCU STEPDOWN

## 2018-05-06 PROCEDURE — 96374 THER/PROPH/DIAG INJ IV PUSH: CPT

## 2018-05-06 PROCEDURE — 77030011943

## 2018-05-06 PROCEDURE — 74011000258 HC RX REV CODE- 258: Performed by: EMERGENCY MEDICINE

## 2018-05-06 PROCEDURE — 74011250636 HC RX REV CODE- 250/636: Performed by: EMERGENCY MEDICINE

## 2018-05-06 PROCEDURE — 74011250636 HC RX REV CODE- 250/636

## 2018-05-06 PROCEDURE — 81001 URINALYSIS AUTO W/SCOPE: CPT | Performed by: EMERGENCY MEDICINE

## 2018-05-06 PROCEDURE — 84484 ASSAY OF TROPONIN QUANT: CPT | Performed by: EMERGENCY MEDICINE

## 2018-05-06 PROCEDURE — 96375 TX/PRO/DX INJ NEW DRUG ADDON: CPT

## 2018-05-06 PROCEDURE — 93005 ELECTROCARDIOGRAM TRACING: CPT

## 2018-05-06 PROCEDURE — 80053 COMPREHEN METABOLIC PANEL: CPT | Performed by: EMERGENCY MEDICINE

## 2018-05-06 PROCEDURE — 85025 COMPLETE CBC W/AUTO DIFF WBC: CPT | Performed by: EMERGENCY MEDICINE

## 2018-05-06 PROCEDURE — 70450 CT HEAD/BRAIN W/O DYE: CPT

## 2018-05-06 PROCEDURE — 36415 COLL VENOUS BLD VENIPUNCTURE: CPT | Performed by: EMERGENCY MEDICINE

## 2018-05-06 RX ORDER — LORAZEPAM 2 MG/ML
1 INJECTION INTRAMUSCULAR
Status: COMPLETED | OUTPATIENT
Start: 2018-05-06 | End: 2018-05-06

## 2018-05-06 RX ORDER — LORAZEPAM 2 MG/ML
INJECTION INTRAMUSCULAR
Status: COMPLETED
Start: 2018-05-06 | End: 2018-05-06

## 2018-05-06 RX ADMIN — LORAZEPAM 1 MG: 2 INJECTION INTRAMUSCULAR; INTRAVENOUS at 19:50

## 2018-05-06 RX ADMIN — LORAZEPAM 1 MG: 2 INJECTION INTRAMUSCULAR; INTRAVENOUS at 19:46

## 2018-05-06 RX ADMIN — LORAZEPAM 1 MG: 2 INJECTION INTRAMUSCULAR at 19:46

## 2018-05-06 RX ADMIN — SODIUM CHLORIDE 1000 MG: 900 INJECTION, SOLUTION INTRAVENOUS at 21:30

## 2018-05-06 NOTE — ED PROVIDER NOTES
HPI Comments: 60 y.o. female with past medical history significant for DM, HTN who presents from EMS for evaluation of possible seizure. Pt has 2 hour onset of weakness in her knees that resulted in an inability to stand without assistance. Pt woke up this morning feeling fine, and attended [employer] with her family at 1000 hours. She went home and ate lunch around 1300 hours, and was still feeling fine with no signs of deviance from baseline status. Her son reports that she was working outside in the garden when they found her with weakness in her knees prompting ED evaluation. EMS report that the pt was tachypneic en route, and her \"tremors started to gradually move upwards from her LE. \" Her shaking initially started at 1845 hours, and gradually worsened. While in ED she was administered 2 mL of Ativan after seizure activity noted - helped to resolve her tremors. Accompanying sx include weakness. Denies LOC, AMS, f/c, couigh, n/v/d, CP, SOB, and anxiety. Her BGL was measured to be greater than 200 en route. There are no other acute medical concerns at this time. PCP: Rocio Melendez MD    Note written by Rula Downing, as dictated by Adeola Dailey MD 7:36 PM      The history is provided by the patient. No  was used. Past Medical History:   Diagnosis Date    Diabetes ([address]     Hypertension        History reviewed. No pertinent surgical history. History reviewed. No pertinent family history. Social History     Social History    Marital status:      Spouse name: N/A    Number of children: N/A    Years of education: N/A     Occupational History    Not on file.      Social History Main Topics    Smoking status: Never Smoker    Smokeless tobacco: Never Used    Alcohol use No    Drug use: No    Sexual activity: Not on file     Other Topics Concern    Not on file     Social History Narrative         ALLERGIES: Review of patient's allergies indicates no known allergies. Review of Systems   Constitutional: Negative for chills and fever. Respiratory: Negative for cough and shortness of breath. Cardiovascular: Negative for chest pain. Gastrointestinal: Negative for diarrhea, nausea and vomiting. Neurological: Positive for tremors and weakness. Negative for syncope. Psychiatric/Behavioral: The patient is not nervous/anxious. All other systems reviewed and are negative. Vitals:    05/06/18 1934   BP: (!) 151/96   Pulse: (!) 120   Resp: (!) 33   Temp: 97.7 °F (36.5 °C)   SpO2: 96%            Physical Exam   Constitutional: She is oriented to person, place, and time. She appears well-developed and well-nourished. HENT:   Head: Normocephalic and atraumatic. Eyes: Conjunctivae are normal. No scleral icterus. Neck: Neck supple. No tracheal deviation present. Cardiovascular: Regular rhythm, normal heart sounds and intact distal pulses. Tachycardia present. Exam reveals no gallop and no friction rub. No murmur heard. Pulmonary/Chest: Effort normal and breath sounds normal. She has no wheezes. She has no rales. Abdominal: Soft. She exhibits no distension. There is no tenderness. There is no rebound and no guarding. Musculoskeletal: She exhibits no edema. Neurological: She is alert and oriented to person, place, and time. Head turned to left. Rhythmic shaking greater on left side. Awake and alert but in moderate distress. Communicating w/ son in law   Skin: Skin is warm. No rash noted. She is diaphoretic. Psychiatric: She has a normal mood and affect. Nursing note and vitals reviewed. Note written by Rula Funk, as dictated by Jason Briseno MD 7:36 PM    [facility]      ED Course       Procedures    ED EKG interpretation:  Rhythm: sinus tachycardia;  Rate (approx.): 105; ST/T wave: normal;     Note written by Mandi Roberts, as dictated by Jason Briseno MD 8:41 PM    CONSULT NOTE:  8:59 PM Jason Briseno MD spoke with Dr. Sivan Alvarado, Consult for Teleneurology. Discussed available diagnostic tests and clinical findings. Consult note: Dr. Cayetano Primrose - will admit. Ferd Mohs, MD  9:54 PM    A/P: new-onset complex partial seizure - resolved with Ativan in the ED; loaded with Keppra; CT shows old right MCA infarct but new since 2016; admit for further management.   Ferd Mohs, MD

## 2018-05-06 NOTE — IP AVS SNAPSHOT
[address] 
[phone] Patient: Gaudencio Blackmon MRN: [id number] AKP:[birth year] A check romel indicates which time of day the medication should be taken. My Medications START taking these medications Instructions Each Dose to Equal  
 Morning Noon Evening Bedtime  
 aspirin delayed-release 81 mg tablet Your last dose was: Your next dose is: Take 1 Tab by mouth daily. 81 mg  
    
   
   
   
  
 levETIRAcetam 250 mg tablet Commonly known as:  KEPPRA Your last dose was: Your next dose is: Take 1 PO QAM and 2 Tabs qPM  
     
   
   
   
  
 metoprolol tartrate 25 mg tablet Commonly known as:  LOPRESSOR Your last dose was: Your next dose is: Take 0.5 Tabs by mouth two (2) times a day. 12.5 mg  
    
   
   
   
  
  
CHANGE how you take these medications Instructions Each Dose to Equal  
 Morning Noon Evening Bedtime  
 insulin detemir U-100 100 unit/mL (3 mL) Inpn Commonly known as:  Meka Ch What changed:   
- how much to take - when to take this 
- additional instructions Your last dose was: Your next dose is:    
   
   
 54 Units by SubCUTAneous route Daily (before breakfast). Indications: PAP medication 54 Units CONTINUE taking these medications Instructions Each Dose to Equal  
 Morning Noon Evening Bedtime  
 lisinopril-hydroCHLOROthiazide 20-25 mg per tablet Commonly known as:  Vaibhav Rich Your last dose was: Your next dose is: Take 2 Tabs by mouth daily. 2 Tab  
    
   
   
   
  
 metFORMIN  mg tablet Commonly known as:  GLUCOPHAGE XR Your last dose was: Your next dose is: Take 1 in the morning and 2 at night for 1 week and then 2 in the morning and 2 at night. omeprazole 20 mg capsule Commonly known as:  PRILOSEC Your last dose was: Your next dose is: Take 1 Cap by mouth daily as needed. For stomach pain. 20 mg  
    
   
   
   
  
 zolpidem 5 mg tablet Commonly known as:  AMBIEN Your last dose was: Your next dose is: TAKE ONE TABLET BY MOUTH NIGHTLY AS NEEDED FOR SLEEP**MAX DAILY AMOUNT: 5 MG** Where to Get Your Medications Information on where to get these meds will be given to you by the nurse or doctor. ! Ask your nurse or doctor about these medications  
  aspirin delayed-release 81 mg tablet  
 insulin detemir U-100 100 unit/mL (3 mL) Inpn  
 levETIRAcetam 250 mg tablet  
 metoprolol tartrate 25 mg tablet

## 2018-05-06 NOTE — ED TRIAGE NOTES
Patient arrives via EMS from home after being found on the floor sitting against couch after lowering herself down with LEFT leg tremors. /130 en route per EMS, ST 100s. Patient with no facial droop/drift. No hx seizures, no post ictal state.   Hx DM, HTN, no meds today

## 2018-05-06 NOTE — ED NOTES
Patient with worsening tremors, remains alert and verbal, tremors to entire body, seizure like activity.   Dr Rivera Burn at bedside and aware

## 2018-05-06 NOTE — ED NOTES
2000  Pt received in stretcher. Dr. Evelyn Augustin at bedside. Pt actively seizing. Presenting with jerking/gross tremors in the upper and lower extremities isolated on the left side. Pt remains awake and alert. Sats in high 90s on 4l. Airway remains patent. Suction set up at bedside. Medicated with 2mg of ativan. Per family member pt does not have a diagnosed seizure history. Continue frequent monitoring. 2100  Pt resting safely in stretcher. Currently no evidence of seizure activity. 2300  Pt  drowsy but arouses to voice. O2 sats remain WDL. 2355  TRANSFER - OUT REPORT:    Verbal report given to Terra(name) on [facility]  being transferred to [facility](unit) for routine progression of care       Report consisted of patients Situation, Background, Assessment and   Recommendations(SBAR). Information from the following report(s) SBAR and ED Summary was reviewed with the receiving nurse. Lines:   Peripheral IV 05/06/18 Right Arm (Active)   Site Assessment Clean, dry, & intact 5/6/2018  7:47 PM   Phlebitis Assessment 0 5/6/2018  7:47 PM   Infiltration Assessment 0 5/6/2018  7:47 PM   Dressing Status Clean, dry, & intact 5/6/2018  7:47 PM   Dressing Type Transparent 5/6/2018  7:47 PM   Hub Color/Line Status Pink;Flushed;Patent 5/6/2018  7:47 PM   Action Taken Blood drawn 5/6/2018  7:47 PM        Opportunity for questions and clarification was provided.       Patient transported with:   Monitor  Registered Nurse

## 2018-05-06 NOTE — IP AVS SNAPSHOT
2700 [facility] [address] [place] [address] 
[phone] Patient: Andres Orozco MRN: [id number] WZA:9/5/1813 About your hospitalization You were admitted on:  May 6, 2018 You last received care in the:  [address] [place] [address] NEURO-SCI TELE You were discharged on:  May 9, 2018 Why you were hospitalized Your primary diagnosis was:  Seizure (Hcc) Follow-up Information Follow up With Details Comments Contact Info Claudius Hatchet, MD On 5/11/2018 Hospital f/u PCP on Friday, 5/11/18 @ 2:15 p.m.    
 Althea Sharkey [facility] care will call to schedule a visit on 5/12/18. If no call after 12 pm, call [address] [facility] [address] 
[phone] Luis Carlos Cevallos MD Go on 7/26/2018 At 11:30 AM for Hospital Discharge follow up  [address] [facility] [address] [place] [address] 
[phone] [address] Call on 5/9/2018  [address] [place] [address] 
[phone] Your Scheduled Appointments Friday May 11, 2018  2:15 PM EDT Office Visit with Naya Bailey MD  
[address] [id number]  
[phone] Discharge Orders None A check romel indicates which time of day the medication should be taken. My Medications START taking these medications Instructions Each Dose to Equal  
 Morning Noon Evening Bedtime  
 aspirin delayed-release 81 mg tablet Your last dose was: Your next dose is: Take 1 Tab by mouth daily. 81 mg  
    
   
   
   
  
 levETIRAcetam 250 mg tablet Commonly known as:  KEPPRA Your last dose was: Your next dose is: Take 1 PO QAM and 2 Tabs qPM  
     
   
   
   
  
 metoprolol tartrate 25 mg tablet Commonly known as:  LOPRESSOR Your last dose was: Your next dose is: Take 0.5 Tabs by mouth two (2) times a day. 12.5 mg  
    
   
   
   
  
  
CHANGE how you take these medications Instructions Each Dose to Equal  
 Morning Noon Evening Bedtime  
 insulin detemir U-100 100 unit/mL (3 mL) Inpn Commonly known as:  Wendy Mars What changed:   
- how much to take - when to take this 
- additional instructions Your last dose was: Your next dose is:    
   
   
 54 Units by SubCUTAneous route Daily (before breakfast). Indications: PAP medication 54 Units CONTINUE taking these medications Instructions Each Dose to Equal  
 Morning Noon Evening Bedtime  
 lisinopril-hydroCHLOROthiazide 20-25 mg per tablet Commonly known as:  Aura Job Your last dose was: Your next dose is: Take 2 Tabs by mouth daily. 2 Tab  
    
   
   
   
  
 metFORMIN  mg tablet Commonly known as:  GLUCOPHAGE XR Your last dose was: Your next dose is: Take 1 in the morning and 2 at night for 1 week and then 2 in the morning and 2 at night. omeprazole 20 mg capsule Commonly known as:  PRILOSEC Your last dose was: Your next dose is: Take 1 Cap by mouth daily as needed. For stomach pain. 20 mg  
    
   
   
   
  
 zolpidem 5 mg tablet Commonly known as:  AMBIEN Your last dose was: Your next dose is: TAKE ONE TABLET BY MOUTH NIGHTLY AS NEEDED FOR SLEEP**MAX DAILY AMOUNT: 5 MG** Where to Get Your Medications Information on where to get these meds will be given to you by the nurse or doctor. ! Ask your nurse or doctor about these medications  
  aspirin delayed-release 81 mg tablet  
 insulin detemir U-100 100 unit/mL (3 mL) Inpn  
 levETIRAcetam 250 mg tablet  
 metoprolol tartrate 25 mg tablet Discharge Instructions Please bring this form with you to show your primary care provider at your follow-up appointment. Primary care provider:  Dr. Jenny Hendrix MD 
 
Discharging provider:  Saray Arceo MD 
 
You have been admitted to the hospital with the following diagnoses: 
· Seizure ([address] FOLLOW-UP CARE RECOMMENDATIONS: 
 
APPOINTMENTS: 
Follow-up Information Follow up With Details Comments Contact Info Shahnaz Zarate MD On 5/11/2018 Hospital f/u PCP on Friday, 5/11/18 @ 2:15 p.m[address] will call to schedule a visit on 5/12/18. If no call after 12 pm, call 450 [facility] [address] 
[phone] Beth Pérez MD In 1 month Discharge follow up  [address] [place] [address] 
[phone] FOLLOW-UP TESTS recommended: - New Medication: Keppra for seizures - Take Aspirin 81mg daily - Check Blood glucose 2-3 times daily before Breakfast/Lunch/Dinner PENDING TEST RESULTS: 
At the time of your discharge the following test results are still pending: NONE Please make sure you review these results with your outpatient follow-up provider(s). SYMPTOMS to watch for: chest pain, shortness of breath, fever, chills, nausea, vomiting, diarrhea, change in mentation, falling, weakness, bleeding. DIET/what to eat:  Diabetic Diet ACTIVITY:  Activity as tolerated WOUND CARE: NONE 
 
EQUIPMENT needed:  NONE What to do if new or unexpected symptoms occur? If you experience any of the above symptoms (or should other concerns or questions arise after discharge) please call your primary care physician. Return to the emergency room if you cannot get hold of your doctor. · It is very important that you keep your follow-up appointment(s). · Please bring discharge papers, medication list (and/or medication bottles) to your follow-up appointments for review by your outpatient provider(s). · Please check the list of medications and be sure it includes every medication (even non-prescription medications) that your provider wants you to take. · It is important that you take the medication exactly as they are prescribed. · Keep your medication in the bottles provided by the pharmacist and keep a list of the medication names, dosages, and times to be taken in your wallet. · Do not take other medications without consulting your doctor. · If you have any questions about your medications or other instructions, please talk to your nurse or care provider before you leave the hospital. 
 
I understand that if any problems occur once I am at home I am to contact my physician. These instructions were explained to me and I had the opportunity to ask questions. Seizure: Care Instructions Your Care Instructions Seizures are caused by abnormal patterns of electrical signals in the brain. They are different for each person. Seizures can affect movement, speech, vision, or awareness. Some people have only slight shaking of a hand and do not pass out. Other people may pass out and have violent shaking of the whole body. Some people appear to stare into space. They are awake, but they can't respond normally. Later, they may not remember what happened. You may need tests to identify the type and cause of the seizures. A seizure may occur only once, or you may have them more than one time. Taking medicines as directed and following up with your doctor may help keep you from having more seizures. The doctor has checked you carefully, but problems can develop later. If you notice any problems or new symptoms, get medical treatment right away. Follow-up care is a key part of your treatment and safety. Be sure to make and go to all appointments, and call your doctor if you are having problems. It's also a good idea to know your test results and keep a list of the medicines you take. How can you care for yourself at home? · Be safe with medicines. Take your medicines exactly as prescribed. Call your doctor if you think you are having a problem with your medicine. · Do not do any activity that could be dangerous to you or others until your doctor says it is safe to do so. For example, do not drive a car, operate machinery, swim, or climb ladders. · Be sure that anyone treating you for any health problem knows that you have had a seizure and what medicines you are taking for it. · Identify and avoid things that may make you more likely to have a seizure. These may include lack of sleep, alcohol or drug use, stress, or not eating. · Make sure you go to your follow-up appointment. When should you call for help? Call 911 anytime you think you may need emergency care. For example, call if: 
? · You have another seizure. ? · You have more than one seizure in 24 hours. ? · You have new symptoms, such as trouble walking, speaking, or thinking clearly. ?Call your doctor now or seek immediate medical care if: 
? · You are not acting normally. ? Watch closely for changes in your health, and be sure to contact your doctor if you have any problems. Where can you learn more? Go to http://monica-kaitlin.info/. Enter O163 in the search box to learn more about \"Seizure: Care Instructions. \" Current as of: October 14, 2016 Content Version: 11.4 © 8431-6678 [employer]. Care instructions adapted under license by Give[employer] (which disclaims liability or warranty for this information). If you have questions about a medical condition or this instruction, always ask your healthcare professional. [place] [address] any warranty or liability for your use of this information. Low Sodium Diet (2,000 Milligram): Care Instructions Your Care Instructions Too much sodium causes your body to hold on to extra water.  This can raise your blood pressure and force your heart and kidneys to work harder. In very serious cases, this could cause you to be put in the hospital. It might even be life-threatening. By limiting sodium, you will feel better and lower your risk of serious problems. The most common source of sodium is salt. People get most of the salt in their diet from canned, prepared, and packaged foods. Fast food and restaurant meals also are very high in sodium. Your doctor will probably limit your sodium to less than 2,000 milligrams (mg) a day. This limit counts all the sodium in prepared and packaged foods and any salt you add to your food. Follow-up care is a key part of your treatment and safety. Be sure to make and go to all appointments, and call your doctor if you are having problems. It's also a good idea to know your test results and keep a list of the medicines you take. How can you care for yourself at home? Read food labels · Read labels on cans and food packages. The labels tell you how much sodium is in each serving. Make sure that you look at the serving size. If you eat more than the serving size, you have eaten more sodium. · Food labels also tell you the Percent Daily Value for sodium. Choose products with low Percent Daily Values for sodium. · Be aware that sodium can come in forms other than salt, including monosodium glutamate (MSG), sodium citrate, and sodium bicarbonate (baking soda). MSG is often added to Asian food. When you eat out, you can sometimes ask for food without MSG or added salt. Buy low-sodium foods · Buy foods that are labeled \"unsalted\" (no salt added), \"sodium-free\" (less than 5 mg of sodium per serving), or \"low-sodium\" (less than 140 mg of sodium per serving). Foods labeled \"reduced-sodium\" and \"light sodium\" may still have too much sodium. Be sure to read the label to see how much sodium you are getting. · Buy fresh vegetables, or frozen vegetables without added sauces.  Buy low-sodium versions of canned vegetables, soups, and other canned goods. Prepare low-sodium meals · Cut back on the amount of salt you use in cooking. This will help you adjust to the taste. Do not add salt after cooking. One teaspoon of salt has about 2,300 mg of sodium. · Take the salt shaker off the table. · Flavor your food with garlic, lemon juice, onion, vinegar, herbs, and spices. Do not use soy sauce, lite soy sauce, steak sauce, onion salt, garlic salt, celery salt, mustard, or ketchup on your food. · Use low-sodium salad dressings, sauces, and ketchup. Or make your own salad dressings and sauces without adding salt. · Use less salt (or none) when recipes call for it. You can often use half the salt a recipe calls for without losing flavor. Other foods such as rice, pasta, and grains do not need added salt. · Rinse canned vegetables, and cook them in fresh water. This removes some-but not all-of the salt. · Avoid water that is naturally high in sodium or that has been treated with water softeners, which add sodium. Call your local water company to find out the sodium content of your water supply. If you buy bottled water, read the label and choose a sodium-free brand. Avoid high-sodium foods · Avoid eating: ¨ Smoked, cured, salted, and canned meat, fish, and poultry. ¨ Ham, do, hot dogs, and luncheon meats. ¨ Regular, hard, and processed cheese and regular peanut butter. ¨ Crackers with salted tops, and other salted snack foods such as pretzels, chips, and salted popcorn. ¨ Frozen prepared meals, unless labeled low-sodium. ¨ Canned and dried soups, broths, and bouillon, unless labeled sodium-free or low-sodium. ¨ Canned vegetables, unless labeled sodium-free or low-sodium. ¨ Western Domi fries, pizza, tacos, and other fast foods. ¨ Pickles, olives, ketchup, and other condiments, especially soy sauce, unless labeled sodium-free or low-sodium. Where can you learn more? Go to http://monica-kaitlin.info/. Enter A848 in the search box to learn more about \"Low Sodium Diet (2,000 Milligram): Care Instructions. \" Current as of: May 12, 2017 Content Version: 11.4 © 9333-0784 [employer]aveit. Care instructions adapted under license by [employer] (which disclaims liability or warranty for this information). If you have questions about a medical condition or this instruction, always ask your healthcare professional. [place] [address] any warranty or liability for your use of this information. Learning About Diabetes Food Guidelines Your Care Instructions Meal planning is important to manage diabetes. It helps keep your blood sugar at a target level (which you set with your doctor). You don't have to eat special foods. You can eat what your family eats, including sweets once in a while. But you do have to pay attention to how often you eat and how much you eat of certain foods. You may want to work with a dietitian or a certified diabetes educator (CDE) to help you plan meals and snacks. A dietitian or CDE can also help you lose weight if that is one of your goals. What should you know about eating carbs? Managing the amount of carbohydrate (carbs) you eat is an important part of healthy meals when you have diabetes. Carbohydrate is found in many foods. · Learn which foods have carbs. And learn the amounts of carbs in different foods. ¨ Bread, cereal, pasta, and rice have about 15 grams of carbs in a serving. A serving is 1 slice of bread (1 ounce), ½ cup of cooked cereal, or 1/3 cup of cooked pasta or rice. ¨ Fruits have 15 grams of carbs in a serving. A serving is 1 small fresh fruit, such as an apple or orange; ½ of a banana; ½ cup of cooked or canned fruit; ½ cup of fruit juice; 1 cup of melon or raspberries; or 2 tablespoons of dried fruit. ¨ Milk and no-sugar-added yogurt have 15 grams of carbs in a serving.  A serving is 1 cup of milk or 2/3 cup of no-sugar-added yogurt. ¨ Starchy vegetables have 15 grams of carbs in a serving. A serving is ½ cup of mashed potatoes or sweet potato; 1 cup winter squash; ½ of a small baked potato; ½ cup of cooked beans; or ½ cup cooked corn or green peas. · Learn how much carbs to eat each day and at each meal. A dietitian or CDE can teach you how to keep track of the amount of carbs you eat. This is called carbohydrate counting. · If you are not sure how to count carbohydrate grams, use the Plate Method to plan meals. It is a good, quick way to make sure that you have a balanced meal. It also helps you spread carbs throughout the day. ¨ Divide your plate by types of foods. Put non-starchy vegetables on half the plate, meat or other protein food on one-quarter of the plate, and a grain or starchy vegetable in the final quarter of the plate. To this you can add a small piece of fruit and 1 cup of milk or yogurt, depending on how many carbs you are supposed to eat at a meal. 
· Try to eat about the same amount of carbs at each meal. Do not \"save up\" your daily allowance of carbs to eat at one meal. 
· Proteins have very little or no carbs per serving. Examples of proteins are beef, chicken, turkey, fish, eggs, tofu, cheese, cottage cheese, and peanut butter. A serving size of meat is 3 ounces, which is about the size of a deck of cards. Examples of meat substitute serving sizes (equal to 1 ounce of meat) are 1/4 cup of cottage cheese, 1 egg, 1 tablespoon of peanut butter, and ½ cup of tofu. How can you eat out and still eat healthy? · Learn to estimate the serving sizes of foods that have carbohydrate. If you measure food at home, it will be easier to estimate the amount in a serving of restaurant food. · If the meal you order has too much carbohydrate (such as potatoes, corn, or baked beans), ask to have a low-carbohydrate food instead. Ask for a salad or green vegetables. · If you use insulin, check your blood sugar before and after eating out to help you plan how much to eat in the future. · If you eat more carbohydrate at a meal than you had planned, take a walk or do other exercise. This will help lower your blood sugar. What else should you know? · Limit saturated fat, such as the fat from meat and dairy products. This is a healthy choice because people who have diabetes are at higher risk of heart disease. So choose lean cuts of meat and nonfat or low-fat dairy products. Use olive or canola oil instead of butter or shortening when cooking. · Don't skip meals. Your blood sugar may drop too low if you skip meals and take insulin or certain medicines for diabetes. · Check with your doctor before you drink alcohol. Alcohol can cause your blood sugar to drop too low. Alcohol can also cause a bad reaction if you take certain diabetes medicines. Follow-up care is a key part of your treatment and safety. Be sure to make and go to all appointments, and call your doctor if you are having problems. It's also a good idea to know your test results and keep a list of the medicines you take. Where can you learn more? Go to http://monica-kaitlin.info/. Enter M289 in the search box to learn more about \"Learning About Diabetes Food Guidelines. \" Current as of: March 13, 2017 Content Version: 11.4 © 8033-9541 Healthwise, Incorporated. Care instructions adapted under license by [employer] (which disclaims liability or warranty for this information). If you have questions about a medical condition or this instruction, always ask your healthcare professional. [place] [address] any warranty or liability for your use of this information. [employer] Announcement We are excited to announce that we are making your provider's discharge notes available to you in [employer].   You will see these notes when they are completed and signed by the physician that discharged you from your recent hospital stay. If you have any questions or concerns about any information you see in [employer], please call the Health Information Department where you were seen or reach out to your Primary Care Provider for more information about your plan of care. Introducing [facility]! [employer] introduces [employer] patient portal. Now you can access parts of your medical record, email your doctor's office, and request medication refills online. 1. In your internet browser, go to [email or web address] 2. Click on the First Time User? Click Here link in the Sign In box. You will see the New Member Sign Up page. 3. Enter your [employer] Access Code exactly as it appears below. You will not need to use this code after youve completed the sign-up process. If you do not sign up before the expiration date, you must request a new code. · [employer] Access Code: [email or web address] Expires: 8/4/2018  7:27 PM 
 
4. Enter the last four digits of your Social Security Number (xxxx) and Date of Birth (mm/dd/yyyy) as indicated and click Submit. You will be taken to the next sign-up page. 5. Create a [employer] ID. This will be your [employer] login ID and cannot be changed, so think of one that is secure and easy to remember. 6. Create a [employer] password. You can change your password at any time. 7. Enter your Password Reset Question and Answer. This can be used at a later time if you forget your password. 8. Enter your e-mail address. You will receive e-mail notification when new information is available in [address] 9. Click Sign Up. You can now view and download portions of your medical record. 10. Click the Download Summary menu link to download a portable copy of your medical information.  
 
If you have questions, please visit the Frequently Asked Questions section of the [employer]. Remember, MyChart is NOT to be used for urgent needs. For medical emergencies, dial 911. Now available from your iPhone and Android! Introducing Rafael Mcgarry As a [address] [place] [address] patient, I wanted to make you aware of our electronic visit tool called Rafael Mcgarry. [facility] 24/7 allows you to connect within minutes with a medical provider 24 hours a day, seven days a week via a mobile device or tablet or logging into a secure website from your computer. You can access Rafael Mcgarry from anywhere in the [place]. A virtual visit might be right for you when you have a simple condition and feel like you just dont want to get out of bed, or cant get away from work for an appointment, when your regular [address] [place] [address] provider is not available (evenings, weekends or holidays), or when youre out of town and need minor care. Electronic visits cost only $49 and if the [facility] 24/7 provider determines a prescription is needed to treat your condition, one can be electronically transmitted to a nearby pharmacy*. Please take a moment to enroll today if you have not already done so. The enrollment process is free and takes just a few minutes. To enroll, please download the [employer] 24/7 jane to your tablet or phone, or visit [email or web address] to enroll on your computer. And, as an [address] [place] [address] patient with a [employer] account, the results of your visits will be scanned into your electronic medical record and your primary care provider will be able to view the scanned results. We urge you to continue to see your regular [address] [place] [address] provider for your ongoing medical care. And while your primary care provider may not be the one available when you seek a Rafael Mcgarry virtual visit, the peace of mind you get from getting a real diagnosis real time can be priceless. For more information on Rafael Mcgarry, view our Frequently Asked Questions (FAQs) at [email or web address]. Sincerely, 
 
Chano Bedolla MD 
Chief Medical Officer [address] *:  certain medications cannot be prescribed via Rafael Mcgarry Providers Seen During Your Hospitalization Provider Specialty Primary office phone Belen Crum MD Emergency Medicine [phone] Master Mueller MD Internal Medicine [phone] Mally Bauman MD Hospitalist [phone] Dior Kasper MD Internal Medicine [phone] Your Primary Care Physician (PCP) Primary Care Physician Office Phone Office Fax MITA ZIMMERMAN ** None ** ** None ** You are allergic to the following No active allergies Recent Documentation Height Weight Breastfeeding? BMI OB Status Smoking Status 1.549 m 47.4 kg No 19.73 kg/m2 Postmenopausal Never Smoker Emergency Contacts Name Discharge Info Relation Home Work Mobile [address] [place] [address] CAREGIVER [3] Child [2] [phone] Patient Belongings The following personal items are in your possession at time of discharge: 
  Dental Appliances: None  Visual Aid: None      Home Medications: None   Jewelry: None  Clothing: At bedside    Other Valuables: None Please provide this summary of care documentation to your next provider. Signatures-by signing, you are acknowledging that this After Visit Summary has been reviewed with you and you have received a copy. Patient Signature:  ____________________________________________________________ Date:  ____________________________________________________________  
  
Greer Zamorano Provider Signature:  ____________________________________________________________ Date:  ____________________________________________________________

## 2018-05-07 ENCOUNTER — APPOINTMENT (OUTPATIENT)
Dept: MRI IMAGING | Age: 63
DRG: 056 | End: 2018-05-07
Attending: HOSPITALIST
Payer: SUBSIDIZED

## 2018-05-07 LAB
ATRIAL RATE: 105 BPM
CALCULATED P AXIS, ECG09: 67 DEGREES
CALCULATED R AXIS, ECG10: 20 DEGREES
CALCULATED T AXIS, ECG11: 43 DEGREES
CHOLEST SERPL-MCNC: 106 MG/DL
DIAGNOSIS, 93000: NORMAL
EST. AVERAGE GLUCOSE BLD GHB EST-MCNC: 338 MG/DL
GLUCOSE BLD STRIP.AUTO-MCNC: 130 MG/DL (ref 65–100)
GLUCOSE BLD STRIP.AUTO-MCNC: 169 MG/DL (ref 65–100)
GLUCOSE BLD STRIP.AUTO-MCNC: 210 MG/DL (ref 65–100)
GLUCOSE BLD STRIP.AUTO-MCNC: 229 MG/DL (ref 65–100)
GLUCOSE BLD STRIP.AUTO-MCNC: 249 MG/DL (ref 65–100)
HBA1C MFR BLD: 13.4 % (ref 4.2–6.3)
HDLC SERPL-MCNC: 25 MG/DL
HDLC SERPL: 4.2 {RATIO} (ref 0–5)
LDLC SERPL CALC-MCNC: 61.6 MG/DL (ref 0–100)
LIPID PROFILE,FLP: NORMAL
P-R INTERVAL, ECG05: 178 MS
Q-T INTERVAL, ECG07: 354 MS
QRS DURATION, ECG06: 72 MS
QTC CALCULATION (BEZET), ECG08: 467 MS
SERVICE CMNT-IMP: ABNORMAL
TRIGL SERPL-MCNC: 97 MG/DL (ref ?–150)
VENTRICULAR RATE, ECG03: 105 BPM
VLDLC SERPL CALC-MCNC: 19.4 MG/DL

## 2018-05-07 PROCEDURE — 82962 GLUCOSE BLOOD TEST: CPT

## 2018-05-07 PROCEDURE — 70551 MRI BRAIN STEM W/O DYE: CPT

## 2018-05-07 PROCEDURE — 97162 PT EVAL MOD COMPLEX 30 MIN: CPT

## 2018-05-07 PROCEDURE — 74011250637 HC RX REV CODE- 250/637: Performed by: HOSPITALIST

## 2018-05-07 PROCEDURE — 92610 EVALUATE SWALLOWING FUNCTION: CPT | Performed by: SPEECH-LANGUAGE PATHOLOGIST

## 2018-05-07 PROCEDURE — 65660000000 HC RM CCU STEPDOWN

## 2018-05-07 PROCEDURE — 95816 EEG AWAKE AND DROWSY: CPT | Performed by: HOSPITALIST

## 2018-05-07 PROCEDURE — 74011636637 HC RX REV CODE- 636/637: Performed by: HOSPITALIST

## 2018-05-07 PROCEDURE — 97165 OT EVAL LOW COMPLEX 30 MIN: CPT

## 2018-05-07 PROCEDURE — 74011000250 HC RX REV CODE- 250: Performed by: HOSPITALIST

## 2018-05-07 PROCEDURE — 93306 TTE W/DOPPLER COMPLETE: CPT

## 2018-05-07 PROCEDURE — 93306 TTE W/DOPPLER COMPLETE: CPT | Performed by: HOSPITALIST

## 2018-05-07 PROCEDURE — G8978 MOBILITY CURRENT STATUS: HCPCS

## 2018-05-07 PROCEDURE — 93880 EXTRACRANIAL BILAT STUDY: CPT

## 2018-05-07 PROCEDURE — 74011000258 HC RX REV CODE- 258: Performed by: HOSPITALIST

## 2018-05-07 PROCEDURE — 74011250636 HC RX REV CODE- 250/636: Performed by: HOSPITALIST

## 2018-05-07 PROCEDURE — C9113 INJ PANTOPRAZOLE SODIUM, VIA: HCPCS | Performed by: HOSPITALIST

## 2018-05-07 PROCEDURE — 83036 HEMOGLOBIN GLYCOSYLATED A1C: CPT | Performed by: HOSPITALIST

## 2018-05-07 PROCEDURE — 36415 COLL VENOUS BLD VENIPUNCTURE: CPT | Performed by: HOSPITALIST

## 2018-05-07 PROCEDURE — 80061 LIPID PANEL: CPT | Performed by: HOSPITALIST

## 2018-05-07 PROCEDURE — 97530 THERAPEUTIC ACTIVITIES: CPT

## 2018-05-07 PROCEDURE — G8979 MOBILITY GOAL STATUS: HCPCS

## 2018-05-07 RX ORDER — SODIUM CHLORIDE 9 MG/ML
100 INJECTION, SOLUTION INTRAVENOUS CONTINUOUS
Status: DISCONTINUED | OUTPATIENT
Start: 2018-05-07 | End: 2018-05-07

## 2018-05-07 RX ORDER — SODIUM CHLORIDE 0.9 % (FLUSH) 0.9 %
5-10 SYRINGE (ML) INJECTION AS NEEDED
Status: DISCONTINUED | OUTPATIENT
Start: 2018-05-07 | End: 2018-05-09 | Stop reason: HOSPADM

## 2018-05-07 RX ORDER — ONDANSETRON 2 MG/ML
4 INJECTION INTRAMUSCULAR; INTRAVENOUS
Status: DISCONTINUED | OUTPATIENT
Start: 2018-05-07 | End: 2018-05-09 | Stop reason: HOSPADM

## 2018-05-07 RX ORDER — MAGNESIUM SULFATE 100 %
4 CRYSTALS MISCELLANEOUS AS NEEDED
Status: DISCONTINUED | OUTPATIENT
Start: 2018-05-07 | End: 2018-05-09 | Stop reason: HOSPADM

## 2018-05-07 RX ORDER — HYDROCHLOROTHIAZIDE 25 MG/1
50 TABLET ORAL DAILY
Status: DISCONTINUED | OUTPATIENT
Start: 2018-05-07 | End: 2018-05-09 | Stop reason: HOSPADM

## 2018-05-07 RX ORDER — LABETALOL HYDROCHLORIDE 5 MG/ML
10 INJECTION, SOLUTION INTRAVENOUS
Status: DISCONTINUED | OUTPATIENT
Start: 2018-05-07 | End: 2018-05-09 | Stop reason: HOSPADM

## 2018-05-07 RX ORDER — SODIUM CHLORIDE 0.9 % (FLUSH) 0.9 %
5-10 SYRINGE (ML) INJECTION EVERY 8 HOURS
Status: DISCONTINUED | OUTPATIENT
Start: 2018-05-07 | End: 2018-05-09 | Stop reason: HOSPADM

## 2018-05-07 RX ORDER — HYDROCHLOROTHIAZIDE 25 MG/1
50 TABLET ORAL DAILY
Status: DISCONTINUED | OUTPATIENT
Start: 2018-05-07 | End: 2018-05-07

## 2018-05-07 RX ORDER — ASPIRIN 81 MG/1
81 TABLET ORAL DAILY
Status: DISCONTINUED | OUTPATIENT
Start: 2018-05-07 | End: 2018-05-09 | Stop reason: HOSPADM

## 2018-05-07 RX ORDER — LISINOPRIL 20 MG/1
40 TABLET ORAL DAILY
Status: DISCONTINUED | OUTPATIENT
Start: 2018-05-07 | End: 2018-05-09 | Stop reason: HOSPADM

## 2018-05-07 RX ORDER — ENOXAPARIN SODIUM 100 MG/ML
40 INJECTION SUBCUTANEOUS EVERY 24 HOURS
Status: DISCONTINUED | OUTPATIENT
Start: 2018-05-07 | End: 2018-05-09 | Stop reason: HOSPADM

## 2018-05-07 RX ORDER — INSULIN LISPRO 100 [IU]/ML
INJECTION, SOLUTION INTRAVENOUS; SUBCUTANEOUS EVERY 6 HOURS
Status: DISCONTINUED | OUTPATIENT
Start: 2018-05-07 | End: 2018-05-08

## 2018-05-07 RX ORDER — DEXTROSE 50 % IN WATER (D50W) INTRAVENOUS SYRINGE
12.5-25 AS NEEDED
Status: DISCONTINUED | OUTPATIENT
Start: 2018-05-07 | End: 2018-05-09 | Stop reason: HOSPADM

## 2018-05-07 RX ORDER — LISINOPRIL 20 MG/1
40 TABLET ORAL DAILY
Status: DISCONTINUED | OUTPATIENT
Start: 2018-05-07 | End: 2018-05-07

## 2018-05-07 RX ORDER — INSULIN GLARGINE 100 [IU]/ML
30 INJECTION, SOLUTION SUBCUTANEOUS DAILY
Status: DISCONTINUED | OUTPATIENT
Start: 2018-05-07 | End: 2018-05-09 | Stop reason: HOSPADM

## 2018-05-07 RX ADMIN — SODIUM CHLORIDE 500 MG: 900 INJECTION, SOLUTION INTRAVENOUS at 22:25

## 2018-05-07 RX ADMIN — INSULIN LISPRO 3 UNITS: 100 INJECTION, SOLUTION INTRAVENOUS; SUBCUTANEOUS at 00:38

## 2018-05-07 RX ADMIN — LABETALOL HYDROCHLORIDE 10 MG: 5 INJECTION, SOLUTION INTRAVENOUS at 08:28

## 2018-05-07 RX ADMIN — LABETALOL HYDROCHLORIDE 10 MG: 5 INJECTION, SOLUTION INTRAVENOUS at 01:34

## 2018-05-07 RX ADMIN — SODIUM CHLORIDE 40 MG: 9 INJECTION INTRAMUSCULAR; INTRAVENOUS; SUBCUTANEOUS at 08:26

## 2018-05-07 RX ADMIN — LISINOPRIL 40 MG: 20 TABLET ORAL at 13:04

## 2018-05-07 RX ADMIN — INSULIN LISPRO 2 UNITS: 100 INJECTION, SOLUTION INTRAVENOUS; SUBCUTANEOUS at 12:40

## 2018-05-07 RX ADMIN — Medication 10 ML: at 13:04

## 2018-05-07 RX ADMIN — INSULIN GLARGINE 30 UNITS: 100 INJECTION, SOLUTION SUBCUTANEOUS at 11:24

## 2018-05-07 RX ADMIN — Medication 10 ML: at 13:05

## 2018-05-07 RX ADMIN — HYDROCHLOROTHIAZIDE 50 MG: 25 TABLET ORAL at 13:04

## 2018-05-07 RX ADMIN — ENOXAPARIN SODIUM 40 MG: 100 INJECTION SUBCUTANEOUS at 00:32

## 2018-05-07 RX ADMIN — NITROGLYCERIN 1 INCH: 20 OINTMENT TOPICAL at 12:41

## 2018-05-07 RX ADMIN — ONDANSETRON 4 MG: 2 INJECTION INTRAMUSCULAR; INTRAVENOUS at 17:31

## 2018-05-07 RX ADMIN — INSULIN LISPRO 3 UNITS: 100 INJECTION, SOLUTION INTRAVENOUS; SUBCUTANEOUS at 05:47

## 2018-05-07 RX ADMIN — Medication 10 ML: at 00:32

## 2018-05-07 RX ADMIN — INSULIN LISPRO 3 UNITS: 100 INJECTION, SOLUTION INTRAVENOUS; SUBCUTANEOUS at 17:46

## 2018-05-07 RX ADMIN — ASPIRIN 81 MG: 81 TABLET, COATED ORAL at 13:04

## 2018-05-07 RX ADMIN — NITROGLYCERIN 1 INCH: 20 OINTMENT TOPICAL at 17:32

## 2018-05-07 RX ADMIN — LABETALOL HYDROCHLORIDE 10 MG: 5 INJECTION, SOLUTION INTRAVENOUS at 22:34

## 2018-05-07 RX ADMIN — SODIUM CHLORIDE 500 MG: 900 INJECTION, SOLUTION INTRAVENOUS at 11:35

## 2018-05-07 RX ADMIN — SODIUM CHLORIDE 100 ML/HR: 900 INJECTION, SOLUTION INTRAVENOUS at 11:26

## 2018-05-07 NOTE — CDMP QUERY
#1    Please clarify if this patient is (was) being treated/managed for:     => Mild Metabolic Acidosis in the setting of pulmonary insuffiencey requiring IV fluids/lab monitoring   => Other explanation of clinical findings  => Clinically Undetermined (no explanation for clinical findings)    The medical record reflects the following clinical findings, treatment, and risk factors. Risk Factors:  possible seizures  Clinical Indicators:  serum CO2 19  Treatment: IV fluids/lab monitoring     Please clarify and document your clinical opinion in the progress notes and discharge summary including the definitive and/or presumptive diagnosis, (suspected or probable), related to the above clinical findings. Please include clinical findings supporting your diagnosis.     Thank you,         Сергей Hernandez [facility]

## 2018-05-07 NOTE — PROGRESS NOTES
Problem: Mobility Impaired (Adult and Pediatric)  Goal: *Acute Goals and Plan of Care (Insert Text)  Physical Therapy Goals  Initiated 5/7/2018  1. Patient will move from supine to sit and sit to supine, scoot up and down and roll side to side in bed with minimal assistance/contact guard assist within 7 day(s). 2.  Patient will transfer from bed to chair and chair to bed with minimal assistance/contact guard assist using the least restrictive device within 7 day(s). 3.  Patient will perform sit to stand with supervision and least restrictive assistive device within 7 day(s). 4.  Patient will ambulate with minimal assistance/contact guard assist for 25 feet with the least restrictive device within 7 day(s). 5.  Patient will improve Rogers Balance score by 7 points within 7 days. 6.  Patient will maintain fair sitting balance x 5 minutes while performing functional reaching tasks with minimal assistance and intermittent UE support in preparation for dynamic standing balance challenges within 7 days. physical Therapy EVALUATION  Patient: Margarita Lovett (58 y.o. female)  Date: 5/7/2018  Primary Diagnosis: Seizure ([facility] [address]   Precautions:  Bed Alarm, Seizure (2* patient does not speak or understand English; speaks Ibo)    ASSESSMENT :  Based on the objective data described below, the patient presents with functional independence below baseline following admission for possible seizure/ CVA (Per MRI: punctate focus of acute infarction in the right basal ganglia and moderate to large area of encephalomalacia in the right parietal, frontal and  temporal lobes consistent with large remote MCA territory infarction). Prior to admission, patient's family reports functional independence (gardening, no AD utilization, no falls, attending [employer], independent ADLs). PT evaluation limited this date by elevated BP (200s/100s) with symptomatic dizziness.  Noted strength deficits on left side (see below for MMT), impaired spatial awareness/coordination, impaired standing balance (significant posterior-left lean with the inability to maintain midline without assistance), pain in left side/hip and left shoulder-neck, and denied sensation deficits (difficult to assess 2* non-english speaking). At this time, recommend discharge to rehab (IP over SNF), however, patient is self-pay. Family appears supportive, however, both daughter and son-in-law work. Next Session: Seated MMT, heel-to-shin assessment, standing tolerance, gait trial, review of BE FAST     Patient will benefit from skilled intervention to address the above impairments. Patients rehabilitation potential is considered to be Fair  Factors which may influence rehabilitation potential include:   []         None noted  []         Mental ability/status  [x]         Medical condition  []         Home/family situation and support systems  []         Safety awareness  []         Pain tolerance/management  [x]         Other: Non-english speaking     PLAN :  Recommendations and Planned Interventions:  [x]           Bed Mobility Training             [x]    Neuromuscular Re-Education  [x]           Transfer Training                   []    Orthotic/Prosthetic Training  [x]           Gait Training                         []    Modalities  [x]           Therapeutic Exercises           []    Edema Management/Control  [x]           Therapeutic Activities            [x]    Patient and Family Training/Education  []           Other (comment):     Frequency/Duration: Patient will be followed by physical therapy 5 times a week to address goals. Discharge Recommendations: Rehab (IP over SNF)  Further Equipment Recommendations for Discharge: TBD     SUBJECTIVE:   Patient stated Thank you.     OBJECTIVE DATA SUMMARY:   HISTORY:    Past Medical History:   Diagnosis Date    Diabetes ([facility] [address]     Hypertension    History reviewed. No pertinent surgical history.   Prior Level of Function/Home Situation: Functionally independent. Personal factors and/or comorbidities impacting plan of care: Non-english speaking, HTN, CVA impairments     Home Situation  Home Environment: Private residence  Living Alone: No  Support Systems: Family member(s) (daughter and son-in-law)  Current DME Used/Available at Home: None  Tub or Shower Type: Tub/Shower combination    EXAMINATION/PRESENTATION/DECISION MAKING:   Critical Behavior:  Neurologic State: Alert  Orientation Level: Oriented X4  Cognition: Appropriate for age attention/concentration, Follows commands  Safety/Judgement: Awareness of environment  Hearing: Auditory  Auditory Impairment: None  Skin: Intact  Edema: None noted in LEs  Range Of Motion:  AROM: Generally decreased, functional  PROM: Within functional limits  Strength:    Strength: Generally decreased, functional (R LE - grossly 4/5l L LE - grossly 2+ to 3/5)  LLE Strength  L Hip Flexion: 2+  L Hip Extension: 3-  L Knee Flexion: 3-  L Knee Extension: 3  L Ankle Dorsiflexion: 3  L Ankle Plantar Flexion: 3  Tone & Sensation:   Tone: Normal  Sensation: Intact    Coordination:  Coordination: Generally decreased, functional (impaired spatial awareness )  Vision:   Acuity: Impaired near vision (baseline, prefers not to wear glasses)  Functional Mobility:  Bed Mobility:  Rolling: Maximum assistance  Supine to Sit: Moderate assistance  Sit to Supine: Moderate assistance  Scooting: Supervision  Transfers:  Sit to Stand: Assist x2;Minimum assistance  Stand to Sit: Assist x2;Minimum assistance  Balance:   Sitting: Impaired; Without support  Sitting - Static: Fair (occasional)  Sitting - Dynamic: Poor (constant support) (posterior left lean)  Standing: Impaired; With support  Standing - Static: Fair  Standing - Dynamic : Poor (Lateral side-stepping (R))    Functional Measure:  Rogers Balance Test:    Sitting to Standin  Standing Unsupported: 0  Sitting with Back Unsupported: 0  Standing to Sittin  Transfers: 0  Standing Unsupported with Eyes Closed: 0  Standing Unsupported with Feet Together: 0  Reach Forward with Outstretched Arm: 0   Object: 0  Turn to Look Over Shoulders: 0  Turn 360 Degrees: 0  Alternate Foot on Step/Stool: 0  Standing Unsupported One Foot in Front: 0  Stand on One Le  Total: 0         56=Maximum possible score;   0-20=High fall risk  21-40=Moderate fall risk   41-56=Low fall risk     Rogers Balance Test and G-code impairment scale:  Percentage of Impairment CH    0%   CI    1-19% CJ    20-39% CK    40-59% CL    60-79% CM    80-99% CN     100%   Rogers   Score 0-56 56 45-55 34-44 23-33 12-22 1-11 0     G codes: In compliance with CMSs Claims Based Outcome Reporting, the following G-code set was chosen for this patient based on their primary functional limitation being treated: The outcome measure chosen to determine the severity of the functional limitation was the Jean Baptiste with a score of 0/56 which was correlated with the impairment scale.     ? Mobility - Walking and Moving Around:     - CURRENT STATUS: CN - 100% impaired, limited or restricted    - GOAL STATUS: CL - 60%-79% impaired, limited or restricted    - D/C STATUS:  ---------------To be determined---------------      Physical Therapy Evaluation Charge Determination   History Examination Presentation Decision-Making   MEDIUM  Complexity : 1-2 comorbidities / personal factors will impact the outcome/ POC  HIGH Complexity : 4+ Standardized tests and measures addressing body structure, function, activity limitation and / or participation in recreation  MEDIUM Complexity : Evolving with changing characteristics  Other outcome measures Rogers  HIGH       Based on the above components, the patient evaluation is determined to be of the following complexity level: MEDIUM    Pain:  Pain Scale 1: Numeric (0 - 10)  Pain Intensity 1: 0     Activity Tolerance:  Please refer to the flowsheet for vital signs taken during this treatment. After treatment:   []         Patient left in no apparent distress sitting up in chair  [x]         Patient left in no apparent distress in bed  [x]         Call bell left within reach   [x]         Nursing notified  []         Caregiver present  []         Bed alarm activated     COMMUNICATION/EDUCATION:   The patients plan of care was discussed with: Occupational Therapist, Registered Nurse and Rehabilitation Attendant. [x]         Fall prevention education was provided and the patient/caregiver indicated understanding. [x]         Patient/family have participated as able in goal setting and plan of care. [x]         Patient/family agree to work toward stated goals and plan of care. []         Patient understands intent and goals of therapy, but is neutral about his/her participation. []         Patient is unable to participate in goal setting and plan of care.     Thank you for this referral.  Cristine Whaley PT, DPT   Time Calculation: 23 mins

## 2018-05-07 NOTE — DIABETES MGMT
DTC Consult Note    Recommendations/ Comments: If appropriate, please consider changing correction scale to ac and hs. Patient may benefit from addition of pre-meal Lispro, 2 units tid ac. Current hospital DM medication: Lantus 30 units, Lispro correction, normal sensitivity    Consult received for:  [x]             Assessment of home management                [x]      Medication Recommendations                [x]             Meter/monitoring     [x]             Insulin instruction     [x]             Outpatient education    Chart reviewed and initial evaluation complete on Chaya Chen. Patient is a 60 y.o. female with a history of diabetes on oral agent (monotherapy): metformin (generic)  insulin injections: Levemir 54 units daily at home. BG monitoring at home 1 time per day. Patient reports BG levels at home trend: 200's-300's per daughter. Patient appearing sleepy, so spoke mostly with daughter who assists with her care. Daughter works, but stops by daily to check her mom's blood sugar which is always high. She questions whether or not her mom takes insulin every day. Suggested she have patient change time of injection to when she comes by. Also, daughter feels a sliding scale would be helpful. Other oral medications may not work due to patient not eating on a schedule and skipping meals. Patient is followed by [facility] due to lack of insurance and receives education, medication and monitoring supplies there per daughter.     Assessed and instructed patient on the following:   ·  interpretation of lab results, blood sugar goals, complications of diabetes mellitus, hypoglycemia prevention and treatment, insulin adjustments and nutrition    Provided patient with the following: [x]             Survival skills education materials               [x]             Outpatient DTC contact number    Discussed with patient and/or family need for follow up appointment for diabetes management after discharge. A1c:   Lab Results   Component Value Date/Time    Hemoglobin A1c 13.4 (H) 05/07/2018 03:17 AM       Recent Glucose Results:   Lab Results   Component Value Date/Time     (H) 05/06/2018 07:51 PM    GLUCPOC 169 (H) 05/07/2018 11:43 AM    GLUCPOC 229 (H) 05/07/2018 05:43 AM    GLUCPOC 249 (H) 05/07/2018 12:34 AM        Lab Results   Component Value Date/Time    Creatinine 1.39 (H) 05/06/2018 07:51 PM     Estimated Creatinine Clearance: 31.3 mL/min (based on Cr of 1.39). Active Orders   Diet    DIET DIABETIC WITH OPTIONS Consistent Carb 2000kcal; Regular        PO intake: No data found. Will continue to follow as needed. Thank you.   Adelina Lowe, MS, RN, CDE

## 2018-05-07 NOTE — CDMP QUERY
#3    Please clarify if this patient is (was) being treated/managed for:     => Uncontrolled Type 2 diabetes mellitus with hyperglycemia in the setting of elevated serum glucose requiring sliding scale   => Other explanation of clinical findings  => Clinically Undetermined (no explanation for clinical findings)    The medical record reflects the following clinical findings, treatment, and risk factors. Risk Factors:  DM  Clinical Indicators:  Glucose: 239   Hemoglobin A1c, (calculated): 13.4   Treatment: sliding scale     Please clarify and document your clinical opinion in the progress notes and discharge summary including the definitive and/or presumptive diagnosis, (suspected or probable), related to the above clinical findings. Please include clinical findings supporting your diagnosis.     Thank you,         [facility]

## 2018-05-07 NOTE — PROGRESS NOTES
Hospitalist Progress Note      Hospital summary: 60 y.o lady with DM and HTN who presented after having seizure-like activity. Assessment/Plan:  Seizure: unclear etiology. Metabolic abnormalities on admission include uncontrolled DM/hyperglycemia and mild dehydration. CT of the head noted an old stroke. -f/u EEG, brain MRI  -continue Keppra  -neuro consult    Type 2 DM, uncontrolled w/ hyperglycemia: a1c 13.4%  -resume home basal insulin at lower dose  -continue SSI  -d/w pt's daughter: the pt doesn't take her meds consistently. Discussed w/ them the need for strict adherence  -DTC consult    HTN urgency:   -resume home meds  -nitropaste in the meantime, PRN labetalol    Renal insufficiency: (POA) chronicity unclear; last value available from 2016.   -d/c IV fluids as she's severely hypertensive  -monitor renal function  -may need to change HCTZ/lisinopril if renal function worsens  -mild acidosis on admission is probably due to seizure. Monitor. Old R MCA CVA: pt/family unaware of stroke hx  -start ASA  -LDL 61.6    Abd pain: mild right-sided pain. No other associated sx.   -monitor; if persists/worsens will obtain additional work-up    Continue therapies    Code status: full  DVT prophylaxis: sq Lovenox  Disposition: TBD  ----------------------------------------------    CC: f/u seizure    S: no further seizure activity; pt complains of mild R-sided abd pain, no dysuria, no n/v, no diarrhea/constipation, tolerating diet     Review of Systems:  Pertinent items are noted in HPI.     O:  Visit Vitals    BP (!) 215/103 (BP 1 Location: Right arm, BP Patient Position: At rest)    Pulse 84    Temp 98.4 °F (36.9 °C)    Resp 16    Ht 5' 1\" (1.549 m)    Wt 48.5 kg (106 lb 14.4 oz)    SpO2 96%    BMI 20.2 kg/m2       PHYSICAL EXAM:  Gen: NAD, non-toxic  HEENT: anicteric sclerae, normal conjunctiva, oropharynx clear, MM moist  Neck: supple, trachea midline, no adenopathy  Heart: RRR, no MRG, no JVD, no peripheral edema  Lungs: CTA b/l, non-labored respirations  Abd: soft, NT, ND, BS+, no organomegaly  Extr: warm  Skin: dry, no rash  Neuro: CN II-XII grossly intact, left-sided weakness  Psych: not anxious nor agitated    No intake or output data in the 24 hours ending 05/07/18 1236     Recent labs & imaging reviewed:  Recent Results (from the past 24 hour(s))   CBC WITH AUTOMATED DIFF    Collection Time: 05/06/18  7:51 PM   Result Value Ref Range    WBC 11.9 (H) 3.6 - 11.0 K/uL    RBC 5.31 (H) 3.80 - 5.20 M/uL    HGB 15.1 11.5 - 16.0 g/dL    HCT 46.1 35.0 - 47.0 %    MCV 86.8 80.0 - 99.0 FL    MCH 28.4 26.0 - 34.0 PG    MCHC 32.8 30.0 - 36.5 g/dL    RDW 12.8 11.5 - 14.5 %    PLATELET 092 (H) 434 - 400 K/uL    MPV 11.0 8.9 - 12.9 FL    NRBC 0.0 0  WBC    ABSOLUTE NRBC 0.00 0.00 - 0.01 K/uL    NEUTROPHILS 60 32 - 75 %    LYMPHOCYTES 31 12 - 49 %    MONOCYTES 8 5 - 13 %    EOSINOPHILS 0 0 - 7 %    BASOPHILS 0 0 - 1 %    IMMATURE GRANULOCYTES 0 0.0 - 0.5 %    ABS. NEUTROPHILS 7.1 1.8 - 8.0 K/UL    ABS. LYMPHOCYTES 3.7 (H) 0.8 - 3.5 K/UL    ABS. MONOCYTES 1.0 0.0 - 1.0 K/UL    ABS. EOSINOPHILS 0.0 0.0 - 0.4 K/UL    ABS. BASOPHILS 0.0 0.0 - 0.1 K/UL    ABS. IMM. GRANS. 0.1 (H) 0.00 - 0.04 K/UL    DF AUTOMATED     METABOLIC PANEL, COMPREHENSIVE    Collection Time: 05/06/18  7:51 PM   Result Value Ref Range    Sodium 138 136 - 145 mmol/L    Potassium 4.2 3.5 - 5.1 mmol/L    Chloride 106 97 - 108 mmol/L    CO2 19 (L) 21 - 32 mmol/L    Anion gap 13 5 - 15 mmol/L    Glucose 239 (H) 65 - 100 mg/dL    BUN 20 6 - 20 MG/DL    Creatinine 1.39 (H) 0.55 - 1.02 MG/DL    BUN/Creatinine ratio 14 12 - 20      GFR est AA 46 (L) >60 ml/min/1.73m2    GFR est non-AA 38 (L) >60 ml/min/1.73m2    Calcium 9.2 8.5 - 10.1 MG/DL    Bilirubin, total 0.3 0.2 - 1.0 MG/DL    ALT (SGPT) 37 12 - 78 U/L    AST (SGOT) 24 15 - 37 U/L    Alk.  phosphatase 109 45 - 117 U/L    Protein, total 8.6 (H) 6.4 - 8.2 g/dL    Albumin 3.8 3.5 - 5.0 g/dL    Globulin 4.8 (H) 2.0 - 4.0 g/dL    A-G Ratio 0.8 (L) 1.1 - 2.2     TROPONIN I    Collection Time: 05/06/18  7:51 PM   Result Value Ref Range    Troponin-I, Qt. <0.04 <0.05 ng/mL   EKG, 12 LEAD, INITIAL    Collection Time: 05/06/18  8:32 PM   Result Value Ref Range    Ventricular Rate 105 BPM    Atrial Rate 105 BPM    P-R Interval 178 ms    QRS Duration 72 ms    Q-T Interval 354 ms    QTC Calculation (Bezet) 467 ms    Calculated P Axis 67 degrees    Calculated R Axis 20 degrees    Calculated T Axis 43 degrees    Diagnosis       Sinus tachycardia  When compared with ECG of 21-MAY-2016 23:31,  No significant change was found  Confirmed by Arsenio Toure M.D., Samul Brilliant ([id number]) on 5/7/2018 8:16:20 AM     URINALYSIS W/ REFLEX CULTURE    Collection Time: 05/06/18  8:37 PM   Result Value Ref Range    Color YELLOW/STRAW      Appearance CLEAR CLEAR      Specific gravity 1.017 1.003 - 1.030      pH (UA) 5.5 5.0 - 8.0      Protein 30 (A) NEG mg/dL    Glucose >1000 (A) NEG mg/dL    Ketone NEGATIVE  NEG mg/dL    Bilirubin NEGATIVE  NEG      Blood NEGATIVE  NEG      Urobilinogen 0.2 0.2 - 1.0 EU/dL    Nitrites NEGATIVE  NEG      Leukocyte Esterase NEGATIVE  NEG      WBC 0-4 0 - 4 /hpf    RBC 0-5 0 - 5 /hpf    Epithelial cells FEW FEW /lpf    Bacteria NEGATIVE  NEG /hpf    UA:UC IF INDICATED CULTURE NOT INDICATED BY UA RESULT CNI      Hyaline cast 0-2 0 - 5 /lpf   GLUCOSE, POC    Collection Time: 05/07/18 12:34 AM   Result Value Ref Range    Glucose (POC) 249 (H) 65 - 100 mg/dL    Performed by [id number] [facility]    Collection Time: 05/07/18  3:17 AM   Result Value Ref Range    LIPID PROFILE          Cholesterol, total 106 <200 MG/DL    Triglyceride 97 <150 MG/DL    HDL Cholesterol 25 MG/DL    LDL, calculated 61.6 0 - 100 MG/DL    VLDL, calculated 19.4 MG/DL    CHOL/HDL Ratio 4.2 0 - 5.0     HEMOGLOBIN A1C WITH EAG    Collection Time: 05/07/18  3:17 AM   Result Value Ref Range    Hemoglobin A1c 13.4 (H) 4.2 - 6.3 %    Est. average glucose 338 mg/dL   GLUCOSE, POC    Collection Time: 05/07/18  5:43 AM   Result Value Ref Range    Glucose (POC) 229 (H) 65 - 100 mg/dL    Performed by Annalise Barragan, POC    Collection Time: 05/07/18 11:43 AM   Result Value Ref Range    Glucose (POC) 169 (H) 65 - 100 mg/dL    Performed by Clara Vasquez      Recent Labs      05/06/18 1951   WBC  11.9*   HGB  15.1   HCT  46.1   PLT  589*     Recent Labs      05/06/18 1951   NA  138   K  4.2   CL  106   CO2  19*   BUN  20   CREA  1.39*   GLU  239*   CA  9.2     Recent Labs      05/06/18 1951   SGOT  24   ALT  37   AP  109   TBILI  0.3   TP  8.6*   ALB  3.8   GLOB  4.8*     No results for input(s): INR, PTP, APTT in the last 72 hours. No lab exists for component: INREXT   No results for input(s): FE, TIBC, PSAT, FERR in the last 72 hours. No results found for: FOL, RBCF   No results for input(s): PH, PCO2, PO2 in the last 72 hours.   Recent Labs      05/06/18 1951   TROIQ  <0.04     Lab Results   Component Value Date/Time    Cholesterol, total 106 05/07/2018 03:17 AM    HDL Cholesterol 25 05/07/2018 03:17 AM    LDL,Direct 119 (H) 01/25/2013 03:10 PM    LDL, calculated 61.6 05/07/2018 03:17 AM    Triglyceride 97 05/07/2018 03:17 AM    CHOL/HDL Ratio 4.2 05/07/2018 03:17 AM     Lab Results   Component Value Date/Time    Glucose (POC) 169 (H) 05/07/2018 11:43 AM    Glucose (POC) 229 (H) 05/07/2018 05:43 AM    Glucose (POC) 249 (H) 05/07/2018 12:34 AM    Glucose (POC) 222 (H) 05/22/2016 03:15 AM    Glucose (POC) 331 (H) 05/21/2016 11:35 PM    Glucose POC 90 NF 09/20/2017 01:46 PM    Glucose POC 89 NF 07/20/2017 01:19 PM    Glucose POC 60 non fasting 06/08/2017 01:25 PM    Glucose  NF 05/03/2017 10:34 AM    Glucose POC non fasting 167 01/04/2017 11:01 AM     Lab Results   Component Value Date/Time    Color YELLOW/STRAW 05/06/2018 08:37 PM    Appearance CLEAR 05/06/2018 08:37 PM    Specific gravity 1.017 05/06/2018 08:37 PM pH (UA) 5.5 05/06/2018 08:37 PM    Protein 30 (A) 05/06/2018 08:37 PM    Glucose >1000 (A) 05/06/2018 08:37 PM    Ketone NEGATIVE  05/06/2018 08:37 PM    Bilirubin NEGATIVE  05/06/2018 08:37 PM    Urobilinogen 0.2 05/06/2018 08:37 PM    Nitrites NEGATIVE  05/06/2018 08:37 PM    Leukocyte Esterase NEGATIVE  05/06/2018 08:37 PM    Epithelial cells FEW 05/06/2018 08:37 PM    Bacteria NEGATIVE  05/06/2018 08:37 PM    WBC 0-4 05/06/2018 08:37 PM    RBC 0-5 05/06/2018 08:37 PM       Med list reviewed  Current Facility-Administered Medications   Medication Dose Route Frequency    levETIRAcetam (KEPPRA) 500 mg in 0.9% sodium chloride 100 mL IVPB  500 mg IntraVENous Q12H    pantoprazole (PROTONIX) 40 mg in sodium chloride 0.9% 10 mL injection  40 mg IntraVENous DAILY    glucose chewable tablet 16 g  4 Tab Oral PRN    dextrose (D50W) injection syrg 12.5-25 g  12.5-25 g IntraVENous PRN    glucagon (GLUCAGEN) injection 1 mg  1 mg IntraMUSCular PRN    sodium chloride (NS) flush 5-10 mL  5-10 mL IntraVENous Q8H    sodium chloride (NS) flush 5-10 mL  5-10 mL IntraVENous PRN    sodium chloride (NS) flush 5-10 mL  5-10 mL IntraVENous Q8H    sodium chloride (NS) flush 5-10 mL  5-10 mL IntraVENous PRN    insulin lispro (HUMALOG) injection   SubCUTAneous Q6H    enoxaparin (LOVENOX) injection 40 mg  40 mg SubCUTAneous Q24H    labetalol (NORMODYNE;TRANDATE) injection 10 mg  10 mg IntraVENous Q6H PRN    insulin glargine (LANTUS) injection 30 Units  30 Units SubCUTAneous DAILY    nitroglycerin (NITROBID) 2 % ointment 1 Inch  1 Inch Topical BID    lisinopril (PRINIVIL, ZESTRIL) tablet 40 mg  40 mg Oral DAILY    And    [START ON 5/8/2018] hydroCHLOROthiazide (HYDRODIURIL) tablet 50 mg  50 mg Oral DAILY       Care Plan discussed with:  Patient/Family and Nurse    Lizet Becerra MD  Internal Medicine  Date of Service: 5/7/2018

## 2018-05-07 NOTE — PROGRESS NOTES
Problem: Self Care Deficits Care Plan (Adult)  Goal: *Acute Goals and Plan of Care (Insert Text)  Occupational Therapy Goals  Initiated 5/7/2018   1. Patient will perform grooming with supervision/set-up within 7 day(s). 2.  Patient will perform standing ADLs 3 mins with moderate assistance  within 7 day(s). 3.  Patient will perform lower body dressing with moderate assistance  within 7 day(s). 4.  Patient will perform toilet transfers with moderate assistance  within 7 day(s). 5.  Patient will perform all aspects of toileting with moderate assistance  within 7 day(s). 6.  Patient will participate in upper extremity therapeutic exercise/activities with supervision/set-up within 7 day(s). Occupational Therapy neurological EVALUATION  Patient: Milla Kerns (73 y.o. female)  Date: 5/7/2018  Primary Diagnosis: Seizure ([facility] [address]        Precautions: bed alarm 2* patient does not speak or understand English       ASSESSMENT :  Based on the objective data described below, the patient presents with setup to total A upper body ADLs, total A lower body ADLs, bed mobility min to total A. ADLs limited by high BP, strength (L side), coordination, motor planning, depth perception (states baseline does not wear glasses, noted old occipital CVA), sitting balance with L lateral and posterior lean, proprioception, dizziness with sitting, pain reported L hip, L side of neck and shoulder. Currently, recommending rehab. However, patient is self pay. Will continue to educate family and patient as to neuro re-education techniques. Provide as many handouts as possible as well? Patient will benefit from skilled intervention to address the above impairments.   Patients rehabilitation potential is considered to be Good  Factors which may influence rehabilitation potential include:   [x]             None noted  []             Mental ability/status  []             Medical condition  []             Home/family situation and support systems  []             Safety awareness  []             Pain tolerance/management  []             Other:      PLAN :  Recommendations and Planned Interventions:  [x]               Self Care Training                  [x]        Therapeutic Activities  [x]               Functional Mobility Training    [x]        Cognitive Retraining  [x]               Therapeutic Exercises           [x]        Endurance Activities  [x]               Balance Training                   [x]        Neuromuscular Re-Education  [x]               Visual/Perceptual Training     [x]   Home Safety Training  [x]               Patient Education                 [x]        Family Training/Education  []               Other (comment):    Frequency/Duration: Patient will be followed by occupational therapy 5 times a week to address goals. Discharge Recommendations: Rehab  Further Equipment Recommendations for Discharge: TBD     SUBJECTIVE:   Patient stated Lit bit.     OBJECTIVE DATA SUMMARY:   HISTORY:   Past Medical History:   Diagnosis Date    Diabetes ([facility] [address]     Hypertension    History reviewed. No pertinent surgical history. Prior Level of Function/Environment/Context: independent with all ADLs except driving. Daughter, son-in-law and granddaughter are very helpful in that they try to always have someone present in the house but they all work as well. They also encourage the patient to be as independent as possible. Occupations in which the patient is/was successful, what are the barriers preventing that success:   Performance Patterns (routines, roles, habits, and rituals): gardening and attending [employer]  Personal Interests and/or values:   Expanded or extensive additional review of patient history: chart showing old R MCA parietal, occipital and temporal dated between 4202-1630 however family denies deficits.      Home Situation  Home Environment: Private residence  Living Alone: No  Support Systems: Family member(s) (daughter and son-in-law)  Current DME Used/Available at Home: None  Tub or Shower Type: Tub/Shower combination  [x]  Right hand dominant   []  Left hand dominant    EXAMINATION OF PERFORMANCE DEFICITS:  Cognitive/Behavioral Status:  Neurologic State: Alert  Orientation Level: Oriented X4  Cognition: Appropriate for age attention/concentration; Follows commands  Perception: Cues to maintain midline in sitting;Cues to maintain midline in standing  Perseveration: No perseveration noted  Safety/Judgement: Awareness of environment    Skin: intact R PIV    Edema: intact    Hearing: Auditory  Auditory Impairment: None    Vision/Perceptual:                           Acuity: Impaired near vision (baseline, prefers not to wear glasses)         Range of Motion:    AROM: Generally decreased, functional (R UE WDL; L shoulder 90* flex, elbow-digits WDL)  PROM: Within functional limits (L UE)                      Strength:    Strength: Generally decreased, functional (-3/5 L shoulder, elbow-digits 3/5; R 4/5)                Coordination:  Coordination: Generally decreased, functional (L > R)  Fine Motor Skills-Upper: Left Impaired;Right Intact    Gross Motor Skills-Upper: Left Intact; Right Intact    Tone & Sensation:    Tone: Normal  Sensation: Intact (per patient report)                      Balance:  Sitting: Impaired; Without support  Sitting - Static: Fair (occasional)  Sitting - Dynamic: Poor (constant support) (L lateral lean)  Standing: Impaired; Without support  Standing - Static: Poor  Standing - Dynamic : Poor    Functional Mobility and Transfers for ADLs:  Bed Mobility:  Rolling: Maximum assistance (R > L A )   Stretcher to bed: total A instruction verbal and tactile cues provided throughout to reach for bed rail and scoot to middle of bed    Co-tx with PT for skilled services 2* report from attempt bed-stretcher patient almost fell on the floor:   Supine to Sit: Moderate assistance (A trunk and L LE)  Sit to Supine:  Moderate assistance (A LEs)  Scooting: Supervision (to EOB supervision; side scooting in the bed min A; up bed m)  Transfers:  Sit to Stand: Maximum assistance  Functional Transfers  Toilet Transfer : Total assistance (not safe at this time)  Tub Transfer: Total assistance (not safe at this time)        ADL Assessment:  Feeding: Minimum assistance daughter reports R handed and can self feed; setup containers inferred 2* coordination    Oral Facial Hygiene/Grooming: Moderate assistance daughter reports R handed and completed with setup; setup containers and A inferred 2* coordination and strength L side    BP high, static sitting balance with L lateral lean, poor standing balance limiting:    Bathing: Total assistance    Upper Body Dressing: Total assistance    Lower Body Dressing: Total assistance    Toileting: Total assistance                ADL Intervention and task modifications:          Encouraged family to allow patient to complete as many ADLs as possible, repetition is key, learning the right movement all for neuro re-education. Cognitive Retraining  Safety/Judgement: Awareness of environment    Therapeutic Exercise:  Encouraged to complete daily. Functional Measure:   Fugl-Moreno Assessment of Motor Recovery after Stroke: not completed 2* BP to high EOB      Pain:  Pain Scale 1: Numeric (0 - 10)  Pain Intensity 1: 0              Activity Tolerance:   Vitals:    05/07/18 0310 05/07/18 0757 05/07/18 1100 05/07/18 1103   BP: 184/78 197/87 (!) 213/101 (!) 222/93   BP 1 Location: Right arm Left arm     BP Patient Position:  At rest Sitting Supine   Pulse:  85     Resp:  18     Temp:  98 °F (36.7 °C)     SpO2:  96%     Weight:       Height:           Please refer to the flowsheet for vital signs taken during this treatment.   After treatment:   []  Patient left in no apparent distress sitting up in chair  [x]  Patient left in no apparent distress in bed  [x]  Call bell left within reach  [x] Nursing notified  [x]  Caregiver present  []  Bed alarm activated    COMMUNICATION/EDUCATION:   Findings and recommendations were discussed with: Physical Therapist and     Patient was educated regarding Her deficit(s) of coordination, motor planning, L sided weakness. Daughter stated understanding but then neutral about OT participation in patient care as to how deficits apply to basic and instrumental ADLs. [x]      Home safety education was provided and the patient/caregiver indicated understanding. [x]      Patient/family have participated as able and agree with findings and recommendations. []      Patient is unable to participate in plan of care at this time.     Thank you for this referral.  Juanis Florez  Time Calculation: 23 mins

## 2018-05-07 NOTE — PROCEDURES
[facility]  *** FINAL REPORT ***    Name: Ivania Haley  MRN: [id number]    Inpatient  : 1955  HIS Order #: [id number]  [address] [facility] Visit #: [id number]  Date: 07 May 2018    TYPE OF TEST: Cerebrovascular Duplex    REASON FOR TEST  Cerebrovascular accident    Right Carotid:-             Proximal               Mid                 Distal  cm/s  Systolic  Diastolic  Systolic  Diastolic  Systolic  Diastolic  CCA:     49.7      10.0                            74.0      13.0  Bulb:  ECA:     74.0      10.0  ICA:     70.0      15.0                            98.0      28.0  ICA/CCA:  0.9       1.2    ICA Stenosis:    Right Vertebral:-  Finding: Antegrade  Sys:       29.0  Malika:        7.0    Right Subclavian:    Left Carotid:-            Proximal                Mid                 Distal  cm/s  Systolic  Diastolic  Systolic  Diastolic  Systolic  Diastolic  CCA:     79.3      16.0                            72.0      14.0  Bulb:  ECA:     67.0      13.0  ICA:     60.0      15.0                            87.0      13.0  ICA/CCA:  0.8       1.1    ICA Stenosis:    Left Vertebral:-  Finding: Antegrade  Sys:       47.0  Malika:       15.0    Left Subclavian:    INTERPRETATION/FINDINGS  PROCEDURE:  Color duplex ultrasound imaging of extracranial  cerebrovascular arteries. FINDINGS:       Right: Internal carotid velocity is within normal limits. There  is narrowing of the internal carotid flow channel on color Doppler  imaging and non-calcific  plaque on B-mode imaging, consistent with  less than 50 percent stenosis (lower portion of the 0 to 49 percent  range). The common and external carotid arteries are patent and  without evidence of hemodynamically significant stenosis. Left:  Internal carotid velocity is within normal limits.   There  is narrowing of the internal carotid flow channel on color Doppler  imaging and non-calcific  plaque on B-mode imaging, consistent with  less than 50 percent stenosis (lower portion of the 0 to 49 percent  range). The common and external carotid arteries are patent and  without evidence of hemodynamically significant stenosis. IMPRESSION:  Consistent with less than 50% stenosis of the right  internal carotid and less than 50% stenosis of the left internal  carotid. Vertebrals are patent with antegrade flow. ADDITIONAL COMMENTS    I have personally reviewed the data relevant to the interpretation of  this  study. TECHNOLOGIST: Antolin Arnold.  Gurdeep Sanchez  Signed: 05/07/2018 11:18 AM    PHYSICIAN: Ronnell Tate MD  Signed: 05/07/2018 12:18 PM

## 2018-05-07 NOTE — PROGRESS NOTES
Problem: Falls - Risk of  Goal: *Absence of Falls  Document Boo Fall Risk and appropriate interventions in the flowsheet.    Outcome: Progressing Towards Goal  Fall Risk Interventions:            Medication Interventions: Teach patient to arise slowly, Patient to call before getting OOB         History of Falls Interventions: Door open when patient unattended

## 2018-05-07 NOTE — CDMP QUERY
#2    Please clarify if this patient is (was) being treated/managed for:     => Hemiparesis POA in the setting of old vs new CVA requiring MRI  => Other explanation of clinical findings  => Clinically Undetermined (no explanation for clinical findings)    The medical record reflects the following clinical findings, treatment, and risk factors. Risk Factors:  HTN  Clinical Indicators:  H/P-Old right MCA CVA. Family member denied that she had a stroke in the past, but imaging indicated she had some major stroke between 2016 and 2018. Currently, she is not moving her left side, not sure if this is postictal or that she has some chronic residual issues or she does have another new stroke. Treatment: MRI    Please clarify and document your clinical opinion in the progress notes and discharge summary including the definitive and/or presumptive diagnosis, (suspected or probable), related to the above clinical findings. Please include clinical findings supporting your diagnosis.     Thank you,         Yudith Serafin [facility]

## 2018-05-07 NOTE — CONSULTS
Neuro consult completed. Dictated note to follow. Limited history from pt, per family noted left sided weakness, then legs started shaking, no ADA/LOC. Per ED note had left sided shaking. MRI brain with old right MCA CVA, tiny punctate right BG DWI positive lesion of unclear significance. Family was unaware of the old stroke, though pt was c/o difficulty with left hand for some time. Pt not on antiplatelet agent at home. Does have poorly controlled DM and HTN, typically well controlled. Exam with drowsy, minimally cooperative pt, left arm and leg weakness, brisk reflexes in left UE, o/w non-focal, but limited. Suspect seizure due to old right MCA CVA with post-ictal Cade's paralysis. Agree with Keppra 500mg bid and ASA 81mg daily. Needs better diabetes management. PT/OT.

## 2018-05-07 NOTE — PROGRESS NOTES
Speech Pathology:  Consult received, chart reviewed. Patient currently being taken off floor for MRI per RN. SLP to continue to follow for evaluation as medically appropriate. Thank you.     Shaun Hilario, SLP

## 2018-05-07 NOTE — PROGRESS NOTES
1150: Notified that pt BP was 222/93 paged Team 8 and spoke to physician; ordered Lisinopril and Hydrodiuril PO  1321: notified physician about right flank pain and foul smelling urine asked for urine culture order; physician stated urine was checked yesterday and it was not needed today.

## 2018-05-07 NOTE — PROGRESS NOTES
Speech Pathology bedside swallow evaluation/discharge  Patient: Nata Vasquez (2 y.o. female)  Date: 5/7/2018  Primary Diagnosis: Seizure ([facility] [address]        Precautions:   Bed Alarm, Seizure (2* patient does not speak or understand English; speaks I[place])    ASSESSMENT :  Based on the objective data described below, the patient presents with intact oropharyngeal swallow absent of s/s aspiration. Patient tolerating PO without difficulty per patient, family and RN. Patient and family report no change in speech or language. Skilled therapy provided by a speech-language pathologist is not indicated at this time. PLAN :  Recommendations:  Continue regular/diabetic diet per MD.  No further SLP treatment warranted  Discharge Recommendations: None     SUBJECTIVE:   Patient stated Arlester Cyrus you. Patient with limited [address] daughter at bedside and served as . RN reports no difficulty swallowing. OBJECTIVE:     Past Medical History:   Diagnosis Date    Diabetes ([facility] [address]     Hypertension    History reviewed. No pertinent surgical history. Prior Level of Function/Home Situation:   Home Situation  Home Environment: Private residence  Living Alone: No  Support Systems: Family member(s) (daughter and son-in-law)  Current DME Used/Available at Home: None  Tub or Shower Type: Tub/Shower combination  Diet prior to admission: Regular  Current Diet:  Regular / Diabetic   Cognitive and Communication Status:  Neurologic State: Alert  Orientation Level: Oriented X4  Cognition: Appropriate for age attention/concentration, Follows commands  Perception: Cues to maintain midline in sitting, Cues to maintain midline in standing  Perseveration: No perseveration noted  Safety/Judgement: Awareness of environment  Oral Assessment:  Oral Assessment  Labial: No impairment  Dentition: Limited;Natural  Oral Hygiene: Moist oral mucosa.   Lingual: No impairment  Velum: Unable to visualize  Mandible: No impairment  P.O. Trials:  Patient Position: Upright in bed. Vocal quality prior to P.O.: No impairment  Consistency Presented: Ice chips; Thin liquid; Solid  How Presented: Self-fed/presented;Cup/sip; Successive swallows     Bolus Acceptance: No impairment  Bolus Formation/Control: No impairment     Propulsion: No impairment  Oral Residue: None  Initiation of Swallow: No impairment  Laryngeal Elevation: Functional  Aspiration Signs/Symptoms: None  Pharyngeal Phase Characteristics: No impairment, issues, or problems              Oral Phase Severity: No impairment  Pharyngeal Phase Severity : No impairment  NOMS:   The NOMS functional outcome measure was used to quantify this patient's level of swallowing impairment. Based on the NOMS, the patient was determined to be at level 7 for swallow function     G Codes: In compliance with CMSs Claims Based Outcome Reporting, the following G-code set was chosen for this patient based the use of the NOMS functional outcome to quantify this patient's level of swallowing impairment. Using the NOMS, the patient was determined to be at level 7 for swallow function which correlates with the CH= 0% level of severity. Based on the objective assessment provided within this note, the current, goal, and discharge g-codes are as follows:    Swallow  Swallowing:   Swallow Current Status CH= 0%   Swallow Goal Status CH= 0%   Swallow D/C Status CH= 0%      NOMS Swallowing Levels:  Level 1 (CN): NPO  Level 2 (CM): NPO but takes consistency in therapy  Level 3 (CL): Takes less than 50% of nutrition p.o. and continues with nonoral feedings; and/or safe with mod cues; and/or max diet restriction  Level 4 (CK):  Safe swallow but needs mod cues; and/or mod diet restriction; and/or still requires some nonoral feeding/supplements  Level 5 (CJ): Safe swallow with min diet restriction; and/or needs min cues  Level 6 (CI): Independent with p.o.; rare cues; usually self cues; may need to avoid some foods or needs extra time  Level 7 [facility]): Independent for all p.o.  KAVITHA. (2003). National Outcomes Measurement System (NOMS): Adult Speech-Language Pathology User's Guide. After treatment:   [] Patient left in no apparent distress sitting up in chair  [x] Patient left in no apparent distress in bed  [x] Call bell left within reach  [x] Nursing notified  [x] Caregiver present  [] Bed alarm activated    COMMUNICATION/EDUCATION:   The patients plan of care including findings, recommendations, and recommended diet changes were discussed with: Registered Nurse. Patient was educated regarding role of SLP and POC. Patient and family verbalize understanding. [] Posted safety precautions in patient's room. [x] Patient/family have participated as able and agree with findings and recommendations. [] Patient is unable to participate in plan of care at this time.     Thank you for this referral.  Trina Meckel, SLP  Time Calculation: 17 mins

## 2018-05-07 NOTE — H&P
[address] [facility]  HISTORY AND PHYSICAL      Maximiano Goltz K.  MR#: [id number]  : 1955  ACCOUNT #: [de-identified]   ADMIT DATE: 2018    CHIEF COMPLAINT:  Unable to walk and possible seizure. Patient currently is status post Ativan and only opens eyes to voice and the patient also does not speak [place]. HISTORY:  [de-identified] from the patient's son-in-law, granddaughter and ED physician, and per ED physician, patient was sent by ambulance and this is a 58-year-old female with past medical history for diabetes and hypertension. The patient lives with the family, and per family member, they deny that she ever had a stroke in the past and patient was able to ambulate independently prior to this episode. The patient went to [employer] at 10:00. Then went home, had lunch around 1 p.m. and then she went outside working in the garden. When family member found her, she was down on the floor, unable to get up and EMS was called and in EMS, patient was noticed tachypnea and then noticed that she started tremors gradually moved upward from her legs en route, but did not indicate which side . Shaking was started around 6:45pm per EMS and gradually worse. She was given 2 mg of Ativan after that and when patient arrived in ED, right after she was transferred from stretcher to the bed, she was noticed having active seizing, presented with jerking, gross tremor in the upper and the lower extremities isolated on the left side only and she was given Ativan and then the seizure stopped, and at this moment, patient only opens eyes to voice. She is able to move her right side extremity, but not moving her left side. History is limited and review of systems unavailable due to her condition, and per son-in-law, no other abnormality was noticed. Prior to that, no fever reported. No vomiting, no diarrhea, no complaining of any pain and eating okay.     PAST MEDICAL HISTORY:  Diabetes, hypertension. MEDICATIONS:  At home, patient is taking Levemir, lisinopril, hydrochlorothiazide, metformin, Prilosec and Ambien. ALLERGIES:  THE PATIENT HAS NO DRUG ALLERGIES. FAMILY HISTORY:  Not significant for stroke, but significant for diabetes. SOCIAL HISTORY:  No smoking, no alcohol. LABORATORY DATA:  On admission, WBC 11.9, H and H 15/46, platelet 683. Sodium 138, potassium 4.2, glucose 239, BUN 20, creatinine 1.3. Troponin is negative. Head CT shows compared to 05/2016 noticed old right MCA infarct, but this was not showed in the CT in 2016. There are encephalomalacia changes in the right periorbital occipital and posterior temporal lobe, most consistent with old infarct. PHYSICAL EXAMINATION:    GENERAL:  The patient currently only opens eyes to voice. She is sleeping. VITAL SIGNS:  Temperature 97.7, heart rate 100, blood pressure 160/93, sats 97 on 4 liters. HEENT:  Pupils dilated, bilaterally looks equal and mucosa looks mildly dry. No erythema in the mouth and the nose and ears are normal.  NECK:  Supple, no carotic bruits, no JVD. CHEST:  Coarse breath sounds. No wheezing. No tenderness on palpation. CARDIOVASCULAR:  Regular rate. No murmur. No pain on palpation. ABDOMEN:  Normal, soft, nontender. Bowel sounds positive. EXTREMITIES:  The patient not moving her left side. SKIN:  Looks dry. NEUROLOGIC:  Unable to assess. ASSESSMENT AND PLAN:  1. Likely a complex partial seizure. ED physician did consult with teleneurology. She is loaded with 1 gram of Keppra. Recommend to continue Keppra 500 q. 12 hours. I will order a brain MRI and EEG. The patient will be kept n.p.o. for now. If she wakes up, she may have speech eval and to start p.o. medicine. 2.  Old right MCA CVA. Family member denied that she had a stroke in the past, but imaging indicated she had some major stroke between 2016 and 2018.   Currently, she is not moving her left side, not sure if this is postictal or that she has some chronic residual issues or she does have another new stroke. We will order brain MRI. If brain MRI confirms another stroke, may consider further stroke workup  3. Diabetes. We will continue sliding scale now. 4.  Hypertension. We will put on p.r.n. labetalol if blood pressure high.       MD FIORDALIZA West/LEONEL  D: 05/06/2018 22:05     T: 05/07/2018 02:28  JOB #: [id number]

## 2018-05-07 NOTE — CDMP QUERY
#4    Please clarify if this patient is (was) being treated/managed for:     => Likely New CVA POA in the setting of HTN/left leg tremors requiring neurology consult  => Other explanation of clinical findings  => Clinically Undetermined (no explanation for clinical findings)    The medical record reflects the following clinical findings, treatment, and risk factors. Risk Factors:  HTN  Clinical Indicators: Old right MCA CVA. Family member denied that she had a stroke in the past, but imaging indicated she had some major stroke between 2016 and 2018. Currently, she is not moving her left side, not sure if this is postictal or that she has some chronic residual issues or she does have another new stroke. We will order brain MRI. If brain MRI confirms another stroke, may consider further stroke workup  Treatment: neurology consult    Please clarify and document your clinical opinion in the progress notes and discharge summary including the definitive and/or presumptive diagnosis, (suspected or probable), related to the above clinical findings. Please include clinical findings supporting your diagnosis.     Thank you,         Rachid Wallace [facility]

## 2018-05-07 NOTE — PROGRESS NOTES
Bedside and Verbal shift change report given to [address] [place] [address],[address] (oncoming nurse) by Loving RN (offgoing nurse). Report included the following information SBAR and ED Summary.

## 2018-05-08 ENCOUNTER — HOME HEALTH ADMISSION (OUTPATIENT)
Dept: HOME HEALTH SERVICES | Facility: HOME HEALTH | Age: 63
End: 2018-05-08
Payer: COMMERCIAL

## 2018-05-08 LAB
ANION GAP SERPL CALC-SCNC: 7 MMOL/L (ref 5–15)
BUN SERPL-MCNC: 15 MG/DL (ref 6–20)
BUN/CREAT SERPL: 14 (ref 12–20)
CALCIUM SERPL-MCNC: 8.7 MG/DL (ref 8.5–10.1)
CHLORIDE SERPL-SCNC: 108 MMOL/L (ref 97–108)
CO2 SERPL-SCNC: 26 MMOL/L (ref 21–32)
CREAT SERPL-MCNC: 1.09 MG/DL (ref 0.55–1.02)
GLUCOSE BLD STRIP.AUTO-MCNC: 118 MG/DL (ref 65–100)
GLUCOSE BLD STRIP.AUTO-MCNC: 145 MG/DL (ref 65–100)
GLUCOSE BLD STRIP.AUTO-MCNC: 238 MG/DL (ref 65–100)
GLUCOSE BLD STRIP.AUTO-MCNC: 238 MG/DL (ref 65–100)
GLUCOSE SERPL-MCNC: 132 MG/DL (ref 65–100)
MAGNESIUM SERPL-MCNC: 1.9 MG/DL (ref 1.6–2.4)
PHOSPHATE SERPL-MCNC: 4.6 MG/DL (ref 2.6–4.7)
POTASSIUM SERPL-SCNC: 3.5 MMOL/L (ref 3.5–5.1)
SERVICE CMNT-IMP: ABNORMAL
SODIUM SERPL-SCNC: 141 MMOL/L (ref 136–145)

## 2018-05-08 PROCEDURE — 74011636637 HC RX REV CODE- 636/637: Performed by: HOSPITALIST

## 2018-05-08 PROCEDURE — 74011000250 HC RX REV CODE- 250: Performed by: HOSPITALIST

## 2018-05-08 PROCEDURE — 97112 NEUROMUSCULAR REEDUCATION: CPT

## 2018-05-08 PROCEDURE — 65660000000 HC RM CCU STEPDOWN

## 2018-05-08 PROCEDURE — 97116 GAIT TRAINING THERAPY: CPT

## 2018-05-08 PROCEDURE — 80048 BASIC METABOLIC PNL TOTAL CA: CPT | Performed by: HOSPITALIST

## 2018-05-08 PROCEDURE — 74011250637 HC RX REV CODE- 250/637: Performed by: HOSPITALIST

## 2018-05-08 PROCEDURE — C9113 INJ PANTOPRAZOLE SODIUM, VIA: HCPCS | Performed by: HOSPITALIST

## 2018-05-08 PROCEDURE — 36415 COLL VENOUS BLD VENIPUNCTURE: CPT | Performed by: HOSPITALIST

## 2018-05-08 PROCEDURE — 74011250637 HC RX REV CODE- 250/637: Performed by: INTERNAL MEDICINE

## 2018-05-08 PROCEDURE — 74011250636 HC RX REV CODE- 250/636: Performed by: HOSPITALIST

## 2018-05-08 PROCEDURE — 84100 ASSAY OF PHOSPHORUS: CPT | Performed by: HOSPITALIST

## 2018-05-08 PROCEDURE — 97535 SELF CARE MNGMENT TRAINING: CPT

## 2018-05-08 PROCEDURE — 82962 GLUCOSE BLOOD TEST: CPT

## 2018-05-08 PROCEDURE — 83735 ASSAY OF MAGNESIUM: CPT | Performed by: HOSPITALIST

## 2018-05-08 RX ORDER — ACETAMINOPHEN 325 MG/1
650 TABLET ORAL
Status: DISCONTINUED | OUTPATIENT
Start: 2018-05-08 | End: 2018-05-09 | Stop reason: HOSPADM

## 2018-05-08 RX ORDER — INSULIN LISPRO 100 [IU]/ML
INJECTION, SOLUTION INTRAVENOUS; SUBCUTANEOUS
Status: DISCONTINUED | OUTPATIENT
Start: 2018-05-08 | End: 2018-05-09 | Stop reason: HOSPADM

## 2018-05-08 RX ORDER — LEVETIRACETAM 250 MG/1
250 TABLET ORAL
Status: DISCONTINUED | OUTPATIENT
Start: 2018-05-09 | End: 2018-05-09 | Stop reason: HOSPADM

## 2018-05-08 RX ORDER — LEVETIRACETAM 500 MG/1
500 TABLET ORAL
Status: DISCONTINUED | OUTPATIENT
Start: 2018-05-08 | End: 2018-05-09 | Stop reason: HOSPADM

## 2018-05-08 RX ORDER — LEVETIRACETAM 500 MG/1
500 TABLET ORAL 2 TIMES DAILY
Status: DISCONTINUED | OUTPATIENT
Start: 2018-05-08 | End: 2018-05-08

## 2018-05-08 RX ORDER — METOPROLOL TARTRATE 25 MG/1
12.5 TABLET, FILM COATED ORAL 2 TIMES DAILY
Status: DISCONTINUED | OUTPATIENT
Start: 2018-05-08 | End: 2018-05-09 | Stop reason: HOSPADM

## 2018-05-08 RX ADMIN — ACETAMINOPHEN 650 MG: 325 TABLET, FILM COATED ORAL at 18:23

## 2018-05-08 RX ADMIN — ASPIRIN 81 MG: 81 TABLET, COATED ORAL at 10:06

## 2018-05-08 RX ADMIN — METOPROLOL TARTRATE 12.5 MG: 25 TABLET ORAL at 17:33

## 2018-05-08 RX ADMIN — INSULIN LISPRO 2 UNITS: 100 INJECTION, SOLUTION INTRAVENOUS; SUBCUTANEOUS at 21:51

## 2018-05-08 RX ADMIN — Medication 10 ML: at 15:09

## 2018-05-08 RX ADMIN — ENOXAPARIN SODIUM 40 MG: 100 INJECTION SUBCUTANEOUS at 01:33

## 2018-05-08 RX ADMIN — INSULIN LISPRO 2 UNITS: 100 INJECTION, SOLUTION INTRAVENOUS; SUBCUTANEOUS at 17:41

## 2018-05-08 RX ADMIN — LEVETIRACETAM 500 MG: 500 TABLET ORAL at 17:33

## 2018-05-08 RX ADMIN — HYDROCHLOROTHIAZIDE 50 MG: 25 TABLET ORAL at 12:33

## 2018-05-08 RX ADMIN — NITROGLYCERIN 1 INCH: 20 OINTMENT TOPICAL at 10:10

## 2018-05-08 RX ADMIN — INSULIN GLARGINE 30 UNITS: 100 INJECTION, SOLUTION SUBCUTANEOUS at 10:06

## 2018-05-08 RX ADMIN — LISINOPRIL 40 MG: 20 TABLET ORAL at 12:33

## 2018-05-08 RX ADMIN — SODIUM CHLORIDE 40 MG: 9 INJECTION INTRAMUSCULAR; INTRAVENOUS; SUBCUTANEOUS at 10:07

## 2018-05-08 RX ADMIN — ACETAMINOPHEN 650 MG: 325 TABLET, FILM COATED ORAL at 01:33

## 2018-05-08 RX ADMIN — INSULIN LISPRO 3 UNITS: 100 INJECTION, SOLUTION INTRAVENOUS; SUBCUTANEOUS at 12:32

## 2018-05-08 RX ADMIN — Medication 10 ML: at 21:53

## 2018-05-08 NOTE — PROGRESS NOTES
Hospital follow-up PCP transitional care appointment has been scheduled with Dr. Candice Brown for Friday, 5/11/18 at 2:15 p.m. Pending patient discharge.   Vitaliy Ramirez, Care Management Specialist.

## 2018-05-08 NOTE — PROGRESS NOTES
00:45  Notified MD that per my assessment, and report from off going RN, pt has been very drowsy, sleeping, but responds to voice then drifts back off to sleep. Pt also c/o severe headache. Pt has no prns. Order received for prn Tylenol q6h prn. Davin Rendon RN      07:45  Bedside shift change report given to [facility] [place] [facility] (oncoming nurse) by Davin Rendon RN (offgoing nurse). Report included the following information SBAR, Kardex, MAR, Recent Results and Cardiac Rhythm NSR,1degree AVB.

## 2018-05-08 NOTE — PROGRESS NOTES
Hospitalist Progress Note      Hospital summary: 60 y.o lady with DM and HTN who presented after having seizure-like activity. Assessment/Plan:  Seizure:likely due to hx of CVA  - CT of the head noted an old stroke. - MRI Brain reviewed  -continue Keppra but decreased to 250mg AM and 500mg PM due to excessive fatigue and drowsiness   -neuro consult, input appreciated    Acute Rt Basal Ganglia and Large Chronic Rt MCA CVA  - on ASA  - US carotid and ECHO reviewed and WNL  - Neurology following    Type 2 DM, uncontrolled w/ hyperglycemia: a1c 13.4%  - Lantus 30U daily  -continue SSI  -d/w pt's daughter: the pt doesn't take her meds consistently. Discussed w/ them the need for strict adherence  -DTC consult    HTN urgency: resolving   -resume Lisinopril and Hydrodiuril  - Add Lopressor BID  - d/c Nitro paste  -PRN labetalol    Renal insufficiency: (POA) chronicity unclear; last value available from 2016.   -d/c IV fluids as she's severely hypertensive  -monitor renal function  -improved, has likely baseline CKD 3 2/2 DM Nephropathy     Old R MCA CVA: pt/family unaware of stroke hx  -start ASA  -LDL 61.6    Abd pain: mild right-sided pain. No other associated sx.   -monitor; if persists/worsens will obtain additional work-up    Continue therapies    Code status: full  DVT prophylaxis: sq Lovenox  Disposition: TBD  ----------------------------------------------    CC: f/u seizure    S: no further seizure activity; more drowsy today   Having Left shoulder pain for past week    Review of Systems:  Pertinent items are noted in HPI.     O:  Visit Vitals    /69 (BP 1 Location: Right arm, BP Patient Position: At rest)    Pulse 87    Temp 97.7 °F (36.5 °C)    Resp 18    Ht 5' 1\" (1.549 m)    Wt 47.4 kg (104 lb 6.4 oz)    SpO2 98%    Breastfeeding No    BMI 19.73 kg/m2       PHYSICAL EXAM:  Gen: NAD, non-toxic  HEENT: anicteric sclerae, normal conjunctiva, oropharynx clear, MM moist  Neck: supple, trachea midline, no adenopathy  Heart: RRR, no MRG, no JVD, no peripheral edema  Lungs: CTA b/l, non-labored respirations  Abd: soft, NT, ND, BS+, no organomegaly  Extr: warm  Skin: dry, no rash  Neuro: awakes to name, follows directions, Motor 5/5 in UE, 3/5 in LLE  Psych: not anxious nor agitated    No intake or output data in the 24 hours ending 05/08/18 1256     Recent labs & imaging reviewed:  Recent Results (from the past 24 hour(s))   GLUCOSE, POC    Collection Time: 05/07/18  5:42 PM   Result Value Ref Range    Glucose (POC) 210 (H) 65 - 100 mg/dL    Performed by [address], POC    Collection Time: 05/07/18 10:10 PM   Result Value Ref Range    Glucose (POC) 130 (H) 65 - 100 mg/dL    Performed by Ginny Brennan    METABOLIC PANEL, BASIC    Collection Time: 05/08/18  4:54 AM   Result Value Ref Range    Sodium 141 136 - 145 mmol/L    Potassium 3.5 3.5 - 5.1 mmol/L    Chloride 108 97 - 108 mmol/L    CO2 26 21 - 32 mmol/L    Anion gap 7 5 - 15 mmol/L    Glucose 132 (H) 65 - 100 mg/dL    BUN 15 6 - 20 MG/DL    Creatinine 1.09 (H) 0.55 - 1.02 MG/DL    BUN/Creatinine ratio 14 12 - 20      GFR est AA >60 >60 ml/min/1.73m2    GFR est non-AA 51 (L) >60 ml/min/1.73m2    Calcium 8.7 8.5 - 10.1 MG/DL   MAGNESIUM    Collection Time: 05/08/18  4:54 AM   Result Value Ref Range    Magnesium 1.9 1.6 - 2.4 mg/dL   PHOSPHORUS    Collection Time: 05/08/18  4:54 AM   Result Value Ref Range    Phosphorus 4.6 2.6 - 4.7 MG/DL   GLUCOSE, POC    Collection Time: 05/08/18  6:37 AM   Result Value Ref Range    Glucose (POC) 118 (H) 65 - 100 mg/dL    Performed by Ginny Brennan    GLUCOSE, POC    Collection Time: 05/08/18 11:41 AM   Result Value Ref Range    Glucose (POC) 238 (H) 65 - 100 mg/dL    Performed by Marivel Ruano      Recent Labs      05/06/18   1951   WBC  11.9*   HGB  15.1   HCT  46.1   PLT  589*     Recent Labs      05/08/18   0454  05/06/18 1951   NA  141  138   K  3.5  4.2   CL  108  106   CO2 26  19*   BUN  15  20   CREA  1.09*  1.39*   GLU  132*  239*   CA  8.7  9.2   MG  1.9   --    PHOS  4.6   --      Recent Labs      05/06/18 1951   SGOT  24   ALT  37   AP  109   TBILI  0.3   TP  8.6*   ALB  3.8   GLOB  4.8*     No results for input(s): INR, PTP, APTT in the last 72 hours. No lab exists for component: INREXT, INREXT   No results for input(s): FE, TIBC, PSAT, FERR in the last 72 hours. No results found for: FOL, RBCF   No results for input(s): PH, PCO2, PO2 in the last 72 hours.   Recent Labs      05/06/18 1952   TROIQ  <0.04     Lab Results   Component Value Date/Time    Cholesterol, total 106 05/07/2018 03:17 AM    HDL Cholesterol 25 05/07/2018 03:17 AM    LDL,Direct 119 (H) 01/25/2013 03:10 PM    LDL, calculated 61.6 05/07/2018 03:17 AM    Triglyceride 97 05/07/2018 03:17 AM    CHOL/HDL Ratio 4.2 05/07/2018 03:17 AM     Lab Results   Component Value Date/Time    Glucose (POC) 238 (H) 05/08/2018 11:41 AM    Glucose (POC) 118 (H) 05/08/2018 06:37 AM    Glucose (POC) 130 (H) 05/07/2018 10:10 PM    Glucose (POC) 210 (H) 05/07/2018 05:42 PM    Glucose (POC) 169 (H) 05/07/2018 11:43 AM     Lab Results   Component Value Date/Time    Color YELLOW/STRAW 05/06/2018 08:37 PM    Appearance CLEAR 05/06/2018 08:37 PM    Specific gravity 1.017 05/06/2018 08:37 PM    pH (UA) 5.5 05/06/2018 08:37 PM    Protein 30 (A) 05/06/2018 08:37 PM    Glucose >1000 (A) 05/06/2018 08:37 PM    Ketone NEGATIVE  05/06/2018 08:37 PM    Bilirubin NEGATIVE  05/06/2018 08:37 PM    Urobilinogen 0.2 05/06/2018 08:37 PM    Nitrites NEGATIVE  05/06/2018 08:37 PM    Leukocyte Esterase NEGATIVE  05/06/2018 08:37 PM    Epithelial cells FEW 05/06/2018 08:37 PM    Bacteria NEGATIVE  05/06/2018 08:37 PM    WBC 0-4 05/06/2018 08:37 PM    RBC 0-5 05/06/2018 08:37 PM       Med list reviewed  Current Facility-Administered Medications   Medication Dose Route Frequency    acetaminophen (TYLENOL) tablet 650 mg  650 mg Oral Q6H PRN    insulin lispro (HUMALOG) injection   SubCUTAneous AC&HS    [START ON 5/9/2018] levETIRAcetam (KEPPRA) tablet 250 mg  250 mg Oral 7am    levETIRAcetam (KEPPRA) tablet 500 mg  500 mg Oral PCD    pantoprazole (PROTONIX) 40 mg in sodium chloride 0.9% 10 mL injection  40 mg IntraVENous DAILY    glucose chewable tablet 16 g  4 Tab Oral PRN    dextrose (D50W) injection syrg 12.5-25 g  12.5-25 g IntraVENous PRN    glucagon (GLUCAGEN) injection 1 mg  1 mg IntraMUSCular PRN    sodium chloride (NS) flush 5-10 mL  5-10 mL IntraVENous Q8H    sodium chloride (NS) flush 5-10 mL  5-10 mL IntraVENous PRN    sodium chloride (NS) flush 5-10 mL  5-10 mL IntraVENous Q8H    sodium chloride (NS) flush 5-10 mL  5-10 mL IntraVENous PRN    enoxaparin (LOVENOX) injection 40 mg  40 mg SubCUTAneous Q24H    labetalol (NORMODYNE;TRANDATE) injection 10 mg  10 mg IntraVENous Q6H PRN    insulin glargine (LANTUS) injection 30 Units  30 Units SubCUTAneous DAILY    nitroglycerin (NITROBID) 2 % ointment 1 Inch  1 Inch Topical BID    lisinopril (PRINIVIL, ZESTRIL) tablet 40 mg  40 mg Oral DAILY    And    hydroCHLOROthiazide (HYDRODIURIL) tablet 50 mg  50 mg Oral DAILY    aspirin delayed-release tablet 81 mg  81 mg Oral DAILY    ondansetron (ZOFRAN) injection 4 mg  4 mg IntraVENous Q4H PRN       Care Plan discussed with:  Patient/Family and Nurse    Juan M Harrell MD  Internal Medicine  Date of Service: 5/8/2018

## 2018-05-08 NOTE — PROGRESS NOTES
1545: pt fell unattended was informed by another staff nurse on unit April RN who found her laying on her back with the IV pole base under her; Dr. Judy Bal was called; Jorje Rogers was completed

## 2018-05-08 NOTE — PROGRESS NOTES
TRANSFER - OUT REPORT:    Verbal report given to [facility] RN(name) on Jonas Parkinson  being transferred to [facility] (unit) for routine progression of care       Report consisted of patients Situation, Background, Assessment and   Recommendations(SBAR). Information from the following report(s) SBAR, ED Summary, OR Summary, Procedure Summary and Recent Results was reviewed with the receiving nurse. Lines:   Peripheral IV 05/06/18 Right Arm (Active)   Site Assessment Clean, dry, & intact 5/8/2018  8:32 AM   Phlebitis Assessment 0 5/8/2018  8:32 AM   Infiltration Assessment 0 5/8/2018  8:32 AM   Dressing Status Clean, dry, & intact 5/8/2018  8:32 AM   Dressing Type Transparent;Tape 5/8/2018  8:32 AM   Hub Color/Line Status Pink;Capped 5/8/2018  8:32 AM   Action Taken Open ports on tubing capped 5/8/2018  8:32 AM   Alcohol Cap Used Yes 5/8/2018  8:32 AM        Opportunity for questions and clarification was provided.       Patient transported with:   [employer]

## 2018-05-08 NOTE — PROGRESS NOTES
Problem: Falls - Risk of  Goal: *Absence of Falls  Document Boo Fall Risk and appropriate interventions in the flowsheet. Outcome: Progressing Towards Goal  Fall Risk Interventions:            Medication Interventions: Teach patient to arise slowly, Patient to call before getting OOB    Elimination Interventions: Toilet paper/wipes in reach, Patient to call for help with toileting needs    History of Falls Interventions: Door open when patient unattended        Problem: Pressure Injury - Risk of  Goal: *Prevention of pressure injury  Document Terry Scale and appropriate interventions in the flowsheet.    Outcome: Progressing Towards Goal  Pressure Injury Interventions:  Sensory Interventions: Check visual cues for pain    Moisture Interventions: Absorbent underpads    Activity Interventions: Increase time out of bed, Pressure redistribution bed/mattress(bed type)         Nutrition Interventions: Discuss nutritional consult with provider, Document food/fluid/supplement intake    Friction and Shear Interventions: Lift sheet

## 2018-05-08 NOTE — CONSULTS
[address]  MR#: [id number]  : 1955  ACCOUNT #: [de-identified]   DATE OF SERVICE: 2018    HISTORY OF PRESENT ILLNESS:  This is a 41-year-old, right-handed female [place] from [facility] [place], who is seen with her daughter at the bedside who aids in history, who presented with presumed seizure yesterday. The patient's family reports she was in her usual state of health all day on , went out to work in the garden, when her leg gave out or started tremoring, she had difficulty getting up. They could not get her up, had to call 911. Apparently, in the ambulance, this tremor in her lower extremity or extremities moved up her body. Apparently, in the emergency department, she had a similar spell, which only involved her left side and then was not moving her left side in the emergency department. There was no reported loss of awareness or loss of consciousness. She was noted to be communicating with her son-in-law in the emergency department, and her daughter confirms that the patient could recall the events that occurred and had relayed those to her last night. She did receive Ativan in the emergency department. Her CT of the head revealed an old right MCA infarct, but new since last imaging 2016, and MRI confirms a large area of encephalomalacia involving the right parietofrontal and temporal lobes in the MCA distribution and there is a right punctate DWI positive lesion in the right basal ganglia. The patient's daughter denies any known history of stroke, but does note that her mom had been complaining that her left hand was not doing what she wanted it to do, but really had not caused her any limitations in activity. The patient has no prior history of seizure.   Daughter monitors her blood pressures at home, they have been in systolics of 771D, and they monitor her diabetes, and her blood sugars have not been well controlled, but they have been attempting to control these, compliant with her medications and working with her primary care at [facility]. Her hemoglobin A1c, however, is 13.4. She does not have a history of hypercholesterolemia, and her LDL is only 61.6. She does not smoke. No known history of sleep apnea. The patient is very sleepy. She does wake briefly with much encouragement from her daughter to participate in her exam, but during the exam rolls back over to go to sleep. PAST MEDICAL HISTORY:  Diabetes and hypertension. REVIEW OF SYSTEMS:  Cannot be obtained secondary to the patient's sleepiness and language barrier. MEDICATIONS:  At home:  Levemir, lisinopril, hydrochlorothiazide, metformin, Prilosec and Ambien. She has been started on Keppra 500 mg b.i.d. here. ALLERGIES:  NONE. SOCIAL HISTORY:  She is , lives with her daughter and son-in-law. Originally from [facility]. No tobacco, alcohol or drug use. FAMILY HISTORY:  No neurological problems reported in her family. PHYSICAL EXAMINATION:  VITAL SIGNS:  At presentation, was 151/96, with a pulse of 120, respiratory rate of 33. Noted to be diaphoretic. Currently, her blood pressure is 175/83, pulse 87, respiratory rate 17, saturating 96% on room air, temperature is 98.4. GENERAL:  She is a well-nourished, well-developed, healthy-appearing female lying in bed asleep, difficult to wake fully. HEART:  Has a regular rhythm without murmur appreciated. NECK:  Her carotids 2+. No bruits. EXTREMITIES:  Warm. She has 2+ radial pulses. No edema. NEUROLOGIC:  Mental status:  She does wake up. She apparently is interacting appropriately with her daughter with normal speech and language. She is able to follow commands. Cranial nerves:  No asymmetry or ptosis. Extraocular eye movements intact without nystagmus seen. Pupils are round and reactive. Tongue is midline. Palate elevated symmetrically.   Motor:  She has mild left upper and lower extremity weakness 4/5, with pronator drift. No tremors. Sensory:  Limited, but appeared intact to pinprick throughout. Reflexes are brisk on the left upper extremity compared to the right. Symmetric in the lower extremities. Coordination was intact to finger-to-nose. Gait:  Cannot be assessed at this time. STUDIES AND REPORTS:  Other than that mentioned above:  Carotids are less than 50% stenosis bilaterally. Echocardiogram is unremarkable. CBC has a white count of 11.9, platelet count of 831. Glucose was 239, creatinine 1.39.    ASSESSMENT AND PLAN:  This is a 41-year-old, right-handed female with hypertension and poorly controlled diabetes, not on an antiplatelet agent at home, presenting with probable focal onset seizure involving her left side, with subsequent weakness on the left side greater than normal, without loss of awareness or loss of consciousness reported, found to have a large area of encephalomalacia in the right MCA distribution, and a probable incidental punctate infarct in the right basal ganglia, who on exam is very sleepy and does have left-sided deficits, otherwise unremarkable exam.  I suspect this was a seizure due to her old right MCA infarct, with postictal Cade paralysis. Agree with Keppra 500 mg b.i.d. and aspirin 81 mg a day as started. She needs better diabetic management, and I would go ahead and maximize treatment of her hypertension at this time. She will need physical therapy and occupational therapy.       MD HEATHER Mcguire / Ryan Ervin  D: 05/08/2018 06:58     T: 05/08/2018 10:29  JOB #: [id number]

## 2018-05-08 NOTE — PROGRESS NOTES
Reason for Admission:   New Seizure, HTN, DM                   RRAT Score:  7                   Plan for utilizing home health:    Patient will have [address] for SN/PT/OT due to weakness in both extremities and elevated BP and blood sugar. Also, patient started on Keppra for seizures, Home Care nurse to observe for signs of medication side effects. Home Care to start on 5/12/18. Referral sent to [facility] via [facility]                    Likelihood of Readmission:  Low to moderate. Patient was independent without any assistive devices prior to admission. She lives with her daughter and son in-law. Transition of Care Plan:  Patient will be discharged home in care of her daughter and will be followed at home by [facility] and Dr. Yoni Marcano at [facility]. Patient only speaks I[place] but family speaks good english. Patient is self pay, daughter has completed and mailed Care Card application. CM will continue to follow as needed. Care Management Interventions  PCP Verified by CM: Yes  Palliative Care Criteria Met (RRAT>21 & CHF Dx)?: No  Mode of Transport at Discharge: Other (see comment) (Private car)  Transition of Care Consult (CM Consult): Discharge Planning  MyChart Signup: No  Discharge Durable Medical Equipment: No  Health Maintenance Reviewed: Yes  Physical Therapy Consult: Yes  Occupational Therapy Consult: Yes  Speech Therapy Consult: Yes  Current Support Network:  Other (She lives with her daughter and son in-law)  Plan discussed with Pt/Family/Caregiver: Yes  Freedom of Choice Offered: Yes  Discharge Location  Discharge Placement: Home with home health

## 2018-05-08 NOTE — PROGRESS NOTES
Problem: Self Care Deficits Care Plan (Adult)  Goal: *Acute Goals and Plan of Care (Insert Text)  Occupational Therapy Goals  Initiated 5/7/2018   1. Patient will perform grooming with supervision/set-up within 7 day(s). 2.  Patient will perform standing ADLs 3 mins with moderate assistance  within 7 day(s). 3.  Patient will perform lower body dressing with moderate assistance  within 7 day(s). 4.  Patient will perform toilet transfers with moderate assistance  within 7 day(s). 5.  Patient will perform all aspects of toileting with moderate assistance  within 7 day(s). 6.  Patient will participate in upper extremity therapeutic exercise/activities with supervision/set-up within 7 day(s). Occupational Therapy TREATMENT  Patient: Rosana Avilez (18 y.o. female)  Date: 5/8/2018  Diagnosis: Seizure ([address] Seizure ([address]       Precautions: Bed Alarm, Seizure (2* patient does not speak or understand [place]; speaks Ibo)  Chart, occupational therapy assessment, plan of care, and goals were reviewed. ASSESSMENT:  Daughter present to translate. Pt speaks ibo ([employer] dialect). Patient min assist with donning socks seated in chair, L hand having difficulty grasping sock. Min assist with functional transfers, mostly for steadying support and turns. Instructed daughter to sit on her mother's L side 2/2 pt appears to have L inattention. Verbalized understanding. Positioned L UE on pillow while pt seated in chair and encouraged pt to rub L UE with R hand to increase sensory awareness. Demonstated understanding. Further assessed vision with clock activity. Per daughter, her mother is able to write numbers in [place]. Decrease visual perceptual skills noted. Decrease bilateral integration noted as well (not holding paper down with L hand to keep paper from moving when writing). Overall, pt lives with supportive family. Anticipate pt will need further rehab before returning home.     Progression toward goals:  []       Improving appropriately and progressing toward goals  [x]       Improving slowly and progressing toward goals  []       Not making progress toward goals and plan of care will be adjusted     PLAN:  Patient continues to benefit from skilled intervention to address the above impairments. Continue treatment per established plan of care. Discharge Recommendations:  Skilled Nursing Facility  Further Equipment Recommendations for Discharge:  TBD     SUBJECTIVE:   Daughter reported that her mother is having difficulty with her vision    OBJECTIVE DATA SUMMARY:   Cognitive/Behavioral Status:  Neurologic State: Alert  Orientation Level: Oriented X4  Cognition: Follows commands (with translation from daughter)             Functional Mobility and Transfers for ADLs:  Bed Mobility:  Supine to Sit: Contact guard assistance (elevated HOB)    Transfers:  Sit to Stand: Assist x1;Minimum assistance (retropulsion)  Functional Transfers  Bathroom Mobility: Minimum assistance  Toilet Transfer : Minimum assistance  Bed to Chair: Additional time;Assist x1;Minimum assistance    Balance:  Sitting: Intact (seated in chair)  Sitting - Static: Fair (occasional); Good (unsupported)  Sitting - Dynamic: Fair (occasional)  Standing: Impaired; With support  Standing - Static: Constant support  Standing - Dynamic : Poor (self reported improvement c/t yesterday)    ADL Intervention:          Lower Body Dressing Assistance  Dressing Assistance: Minimum assistance  Socks: Minimum assistance  Position Performed: Seated in chair              Neuro Re-Education:   Worked on eye hand coordination activities with L hand, pt recognizing the difference compared to her R.   Clock activity to further access visual perceptual skills, impairments noted, poor placement of numbers using clock in room as a reference  L inattention noted 2/2 decrease ability to perform bilateral activities and verbal cues to use L hand to assist (ie use L hand to hold paper down when performing pen/paper tasks)          Pain:  Pain Scale 1: Numeric (0 - 10)  Pain Intensity 1: 0              Activity Tolerance:   Good  Please refer to the flowsheet for vital signs taken during this treatment.   After treatment:   [x] Patient left in no apparent distress sitting up in chair  [] Patient left in no apparent distress in bed  [x] Call bell left within reach  [x] Nursing notified  [x] Caregiver present  [] Bed alarm activated    COMMUNICATION/COLLABORATION:   The patients plan of care was discussed with: Physical Therapist and Registered Nurse    Quynh Crain OTR/L  Time Calculation: 29 mins

## 2018-05-08 NOTE — DIABETES MGMT
DTC Progress Note    Recommendations/ Comments: Chart reviewed due to hyperglycemia. If appropriate, please consider:  1. Increase Lantus to 35 units  2. Consider adding meal time Humalog 2 units tid ac  3. Please document po intake. Current hospital DM medication: Lantus 30 units, Humalog correction scale, normal sensitivity. Chart reviewed on [facility]. Patient is a 60 y.o. female with a history of diabetes on oral agent (monotherapy): metformin (generic)  insulin injections: Levemir 54 units daily at home    A1c:   Lab Results   Component Value Date/Time    Hemoglobin A1c 13.4 (H) 05/07/2018 03:17 AM    Hemoglobin A1c 9.6 (H) 06/08/2017 02:16 PM       Recent Glucose Results:   Lab Results   Component Value Date/Time     (H) 05/08/2018 04:54 AM    GLUCPOC 238 (H) 05/08/2018 11:41 AM    GLUCPOC 118 (H) 05/08/2018 06:37 AM    GLUCPOC 130 (H) 05/07/2018 10:10 PM        Lab Results   Component Value Date/Time    Creatinine 1.09 (H) 05/08/2018 04:54 AM     Estimated Creatinine Clearance: 39.5 mL/min (based on Cr of 1.09). Active Orders   Diet    DIET DIABETIC WITH OPTIONS Consistent Carb 2000kcal; Regular        PO intake: No data found. Will continue to follow as needed.     Thank you  Chadwick Sanders RD, CDE

## 2018-05-08 NOTE — PROGRESS NOTES
Bedside and Verbal shift change report given to hannah (oncoming nurse) by rk (offgoing nurse). Report included the following information SBAR, Kardex, Intake/Output, Recent Results and Cardiac Rhythm nsr.

## 2018-05-08 NOTE — PROGRESS NOTES
Hospitalist    Pt seen and examined  Called by RN as patient fell while attempting to get out of bed without calling for help. Pt was found down with IV pole underneath her. On my evaluation, pt alert and awake  Follows direction  NANO  No change in neurological exam since this AM   D/w RN, place bed alarm and monitor for neurological changes (AMS, decreased responsiveness, lethargy). RN to call daughter and update her.      Francisco Zuniga MD  Internal Medicine  Mobile/text: [phone]

## 2018-05-08 NOTE — PROGRESS NOTES
Problem: Mobility Impaired (Adult and Pediatric)  Goal: *Acute Goals and Plan of Care (Insert Text)  Physical Therapy Goals  Initiated 5/7/2018  1. Patient will move from supine to sit and sit to supine, scoot up and down and roll side to side in bed with minimal assistance/contact guard assist within 7 day(s). 2.  Patient will transfer from bed to chair and chair to bed with minimal assistance/contact guard assist using the least restrictive device within 7 day(s). 3.  Patient will perform sit to stand with supervision and least restrictive assistive device within 7 day(s). 4.  Patient will ambulate with minimal assistance/contact guard assist for 25 feet with the least restrictive device within 7 day(s). 5.  Patient will improve Rogers Balance score by 7 points within 7 days. 6.  Patient will maintain fair sitting balance x 5 minutes while performing functional reaching tasks with minimal assistance and intermittent UE support in preparation for dynamic standing balance challenges within 7 days. physical Therapy TREATMENT  Patient: Africa Hill (88 y.o. female)  Date: 5/8/2018  Diagnosis: Seizure ([facility] [address] Seizure ([facility] [address]       Precautions: Bed Alarm, Seizure (2* patient does not speak or understand English; speaks Ibo)  Chart, physical therapy assessment, plan of care and goals were reviewed. ASSESSMENT:  Chart reviewed, RN cleared patient for mobility, and patient received in bed with daughter present. Per daughter and compared to notes patient appears to have improved compared to evaluation yesterday. Sit to stand required CGA/Min A to correct retropulsion but following initial unsteadiness patient's balance improved. Ambulating around the bed to [facility] patient utilized slow/shuffled gait with apparent L inattention but following completion of personal care with daughter patient's gait and visual scanning improved over the 65 ft in the hallway.  Patient had multiple small LOB/increased trunk sway over session requiring Min A x 1 to correct however in hallway when turning 180deg to L to return to room patient significantly lost her balance with scissoring gait requiring multiple steps to correct and Max A x 1 to maintain upright posture. Once in room patient ended session in chair with OT present to continue therapy. Discharge rec - rehab due to increased risk for falls during OOB mobility. Next session - trial RW? Progression toward goals:  []    Improving appropriately and progressing toward goals  [x]    Improving slowly and progressing toward goals  []    Not making progress toward goals and plan of care will be adjusted     PLAN:  Patient continues to benefit from skilled intervention to address the above impairments. Continue treatment per established plan of care. Discharge Recommendations:  Rehab  Further Equipment Recommendations for Discharge:  TBD, trial RW next session     SUBJECTIVE:   Patient stated \"thank you.     OBJECTIVE DATA SUMMARY:   Critical Behavior:  Neurologic State: Sleeping, Eyes open to voice  Orientation Level: Oriented to place, Oriented to person, Other (Comment) (does not answer orientation to time/situation questions)  Cognition: Follows commands, Impulsive, Poor safety awareness, Decreased attention/concentration  Safety/Judgement: Awareness of environment  Functional Mobility Training:  Bed Mobility:     Supine to Sit: Contact guard assistance (elevated HOB)              Transfers:  Sit to Stand: Assist x1;Minimum assistance (retropulsion)  Stand to Sit: Assist x1;Contact guard assistance (fair eccentric control)        Bed to Chair: Additional time;Assist x1;Minimum assistance                    Balance:  Sitting: Impaired  Sitting - Static: Fair (occasional); Good (unsupported)  Sitting - Dynamic: Fair (occasional)  Standing: Impaired  Standing - Static: Fair  Standing - Dynamic : Poor (self reported improvement c/t yesterday)  Ambulation/Gait Training:  Distance (ft): 80 Feet (ft) (15ft to [facility], 65ft following)  Assistive Device: Gait belt  Ambulation - Level of Assistance: Moderate assistance (to prevent LOB when turning to L, overall CGA)        Gait Abnormalities: Decreased step clearance;Trunk sway increased; Path deviations (LOB turning to L)        Base of Support: Narrowed     Speed/Neris: Slow;Shuffled (improved with distance, declined with fatigue)  Step Length: Right shortened;Left shortened                    Stairs:              Neuro Re-Education:    Therapeutic Exercises:   Sit to stand x 3, retropulsion with narrow BETO observed  Pain:  Pain Scale 1: Numeric (0 - 10)  Pain Intensity 1: 0              Activity Tolerance:   Fair, BP stable throughout, scissoring gait/narrow BETO with dynamic gait items  Please refer to the flowsheet for vital signs taken during this treatment.   After treatment:   [x]    Patient left in no apparent distress sitting up in chair  []    Patient left in no apparent distress in bed  [x]    Call bell left within reach  [x]    Nursing notified  [x]    Caregiver present  []    Bed alarm activated    COMMUNICATION/COLLABORATION:   The patients plan of care was discussed with: Registered Nurse    Tatianna Colon PT, DPT   Time Calculation: 27 mins

## 2018-05-09 ENCOUNTER — TELEPHONE (OUTPATIENT)
Dept: NEUROLOGY | Age: 63
End: 2018-05-09

## 2018-05-09 VITALS
TEMPERATURE: 98.3 F | WEIGHT: 104.28 LBS | OXYGEN SATURATION: 94 % | HEART RATE: 76 BPM | RESPIRATION RATE: 20 BRPM | BODY MASS INDEX: 19.69 KG/M2 | HEIGHT: 61 IN | DIASTOLIC BLOOD PRESSURE: 69 MMHG | SYSTOLIC BLOOD PRESSURE: 124 MMHG

## 2018-05-09 LAB
GLUCOSE BLD STRIP.AUTO-MCNC: 163 MG/DL (ref 65–100)
GLUCOSE BLD STRIP.AUTO-MCNC: 243 MG/DL (ref 65–100)
SERVICE CMNT-IMP: ABNORMAL
SERVICE CMNT-IMP: ABNORMAL

## 2018-05-09 PROCEDURE — 74011250636 HC RX REV CODE- 250/636: Performed by: HOSPITALIST

## 2018-05-09 PROCEDURE — 74011250637 HC RX REV CODE- 250/637: Performed by: HOSPITALIST

## 2018-05-09 PROCEDURE — 74011636637 HC RX REV CODE- 636/637: Performed by: HOSPITALIST

## 2018-05-09 PROCEDURE — 74011000250 HC RX REV CODE- 250: Performed by: HOSPITALIST

## 2018-05-09 PROCEDURE — 82962 GLUCOSE BLOOD TEST: CPT

## 2018-05-09 PROCEDURE — 74011250637 HC RX REV CODE- 250/637: Performed by: INTERNAL MEDICINE

## 2018-05-09 PROCEDURE — C9113 INJ PANTOPRAZOLE SODIUM, VIA: HCPCS | Performed by: HOSPITALIST

## 2018-05-09 RX ORDER — LEVETIRACETAM 250 MG/1
TABLET ORAL
Qty: 90 TAB | Refills: 3 | Status: ON HOLD | OUTPATIENT
Start: 2018-05-09 | End: 2019-02-01 | Stop reason: SDUPTHER

## 2018-05-09 RX ORDER — METOPROLOL TARTRATE 25 MG/1
12.5 TABLET, FILM COATED ORAL 2 TIMES DAILY
Qty: 30 TAB | Refills: 1 | Status: SHIPPED | OUTPATIENT
Start: 2018-05-09 | End: 2018-08-17 | Stop reason: SDUPTHER

## 2018-05-09 RX ORDER — ASPIRIN 81 MG/1
81 TABLET ORAL DAILY
Qty: 30 TAB | Refills: 1 | Status: SHIPPED | OUTPATIENT
Start: 2018-05-09

## 2018-05-09 RX ADMIN — Medication 10 ML: at 06:39

## 2018-05-09 RX ADMIN — ASPIRIN 81 MG: 81 TABLET, COATED ORAL at 10:06

## 2018-05-09 RX ADMIN — LISINOPRIL 40 MG: 20 TABLET ORAL at 12:35

## 2018-05-09 RX ADMIN — ACETAMINOPHEN 650 MG: 325 TABLET, FILM COATED ORAL at 10:21

## 2018-05-09 RX ADMIN — INSULIN GLARGINE 30 UNITS: 100 INJECTION, SOLUTION SUBCUTANEOUS at 10:06

## 2018-05-09 RX ADMIN — ENOXAPARIN SODIUM 40 MG: 100 INJECTION SUBCUTANEOUS at 01:00

## 2018-05-09 RX ADMIN — LEVETIRACETAM 250 MG: 250 TABLET ORAL at 06:39

## 2018-05-09 RX ADMIN — INSULIN LISPRO 2 UNITS: 100 INJECTION, SOLUTION INTRAVENOUS; SUBCUTANEOUS at 10:06

## 2018-05-09 RX ADMIN — METOPROLOL TARTRATE 12.5 MG: 25 TABLET ORAL at 10:06

## 2018-05-09 RX ADMIN — HYDROCHLOROTHIAZIDE 50 MG: 25 TABLET ORAL at 12:35

## 2018-05-09 RX ADMIN — INSULIN LISPRO 3 UNITS: 100 INJECTION, SOLUTION INTRAVENOUS; SUBCUTANEOUS at 12:34

## 2018-05-09 RX ADMIN — SODIUM CHLORIDE 40 MG: 9 INJECTION INTRAMUSCULAR; INTRAVENOUS; SUBCUTANEOUS at 10:06

## 2018-05-09 NOTE — DISCHARGE INSTRUCTIONS
Please bring this form with you to show your primary care provider at your follow-up appointment. Primary care provider:  Dr. Jenny Hendrix MD    Discharging provider:  Saray Arceo MD    You have been admitted to the hospital with the following diagnoses:  · Seizure [facility])    FOLLOW-UP CARE RECOMMENDATIONS:    APPOINTMENTS:  Follow-up Information     Follow up With Details Comments Contact Info    Shahnaz Zarate MD On 5/11/2018 Hospital f/u PCP on Friday, 5/11/18 @ 2:15 p.m.      [address] will call to schedule a visit on 5/12/18. If no call after 12 pm, call [phone]. Shavon [facility] [address] [place] [address], MD In 1 month Discharge follow up  [address] [facility]  [phone]              FOLLOW-UP TESTS recommended:   - New Medication: Keppra for seizures  - Take Aspirin 81mg daily  - Check Blood glucose 2-3 times daily before Breakfast/Lunch/Dinner    PENDING TEST RESULTS:  At the time of your discharge the following test results are still pending: NONE  Please make sure you review these results with your outpatient follow-up provider(s). SYMPTOMS to watch for: chest pain, shortness of breath, fever, chills, nausea, vomiting, diarrhea, change in mentation, falling, weakness, bleeding. DIET/what to eat:  Diabetic Diet    ACTIVITY:  Activity as tolerated    WOUND CARE: NONE    EQUIPMENT needed:  NONE      What to do if new or unexpected symptoms occur? If you experience any of the above symptoms (or should other concerns or questions arise after discharge) please call your primary care physician. Return to the emergency room if you cannot get hold of your doctor. · It is very important that you keep your follow-up appointment(s). · Please bring discharge papers, medication list (and/or medication bottles) to your follow-up appointments for review by your outpatient provider(s).   · Please check the list of medications and be sure it includes every medication (even non-prescription medications) that your provider wants you to take. · It is important that you take the medication exactly as they are prescribed. · Keep your medication in the bottles provided by the pharmacist and keep a list of the medication names, dosages, and times to be taken in your wallet. · Do not take other medications without consulting your doctor. · If you have any questions about your medications or other instructions, please talk to your nurse or care provider before you leave the hospital.    I understand that if any problems occur once I am at home I am to contact my physician. These instructions were explained to me and I had the opportunity to ask questions. Seizure: Care Instructions  Your Care Instructions    Seizures are caused by abnormal patterns of electrical signals in the brain. They are different for each person. Seizures can affect movement, speech, vision, or awareness. Some people have only slight shaking of a hand and do not pass out. Other people may pass out and have violent shaking of the whole body. Some people appear to stare into space. They are awake, but they can't respond normally. Later, they may not remember what happened. You may need tests to identify the type and cause of the seizures. A seizure may occur only once, or you may have them more than one time. Taking medicines as directed and following up with your doctor may help keep you from having more seizures. The doctor has checked you carefully, but problems can develop later. If you notice any problems or new symptoms, get medical treatment right away. Follow-up care is a key part of your treatment and safety. Be sure to make and go to all appointments, and call your doctor if you are having problems. It's also a good idea to know your test results and keep a list of the medicines you take.   How can you care for yourself at home? · Be safe with medicines. Take your medicines exactly as prescribed. Call your doctor if you think you are having a problem with your medicine. · Do not do any activity that could be dangerous to you or others until your doctor says it is safe to do so. For example, do not drive a car, operate machinery, swim, or climb ladders. · Be sure that anyone treating you for any health problem knows that you have had a seizure and what medicines you are taking for it. · Identify and avoid things that may make you more likely to have a seizure. These may include lack of sleep, alcohol or drug use, stress, or not eating. · Make sure you go to your follow-up appointment. When should you call for help? Call 911 anytime you think you may need emergency care. For example, call if:  ? · You have another seizure. ? · You have more than one seizure in 24 hours. ? · You have new symptoms, such as trouble walking, speaking, or thinking clearly. ?Call your doctor now or seek immediate medical care if:  ? · You are not acting normally. ? Watch closely for changes in your health, and be sure to contact your doctor if you have any problems. Where can you learn more? Go to http://monica-kaitlin.info/. Enter N601 in the search box to learn more about \"Seizure: Care Instructions. \"  Current as of: October 14, 2016  Content Version: 11.4  © 4156-5834 [employer]. Care instructions adapted under license by [employer] (which disclaims liability or warranty for this information). If you have questions about a medical condition or this instruction, always ask your healthcare professional. [address] any warranty or liability for your use of this information. Low Sodium Diet (2,000 Milligram): Care Instructions  Your Care Instructions    Too much sodium causes your body to hold on to extra water.  This can raise your blood pressure and force your heart and kidneys to work harder. In very serious cases, this could cause you to be put in the hospital. It might even be life-threatening. By limiting sodium, you will feel better and lower your risk of serious problems. The most common source of sodium is salt. People get most of the salt in their diet from canned, prepared, and packaged foods. Fast food and restaurant meals also are very high in sodium. Your doctor will probably limit your sodium to less than 2,000 milligrams (mg) a day. This limit counts all the sodium in prepared and packaged foods and any salt you add to your food. Follow-up care is a key part of your treatment and safety. Be sure to make and go to all appointments, and call your doctor if you are having problems. It's also a good idea to know your test results and keep a list of the medicines you take. How can you care for yourself at home? Read food labels  · Read labels on cans and food packages. The labels tell you how much sodium is in each serving. Make sure that you look at the serving size. If you eat more than the serving size, you have eaten more sodium. · Food labels also tell you the Percent Daily Value for sodium. Choose products with low Percent Daily Values for sodium. · Be aware that sodium can come in forms other than salt, including monosodium glutamate (MSG), sodium citrate, and sodium bicarbonate (baking soda). MSG is often added to Asian food. When you eat out, you can sometimes ask for food without MSG or added salt. Buy low-sodium foods  · Buy foods that are labeled \"unsalted\" (no salt added), \"sodium-free\" (less than 5 mg of sodium per serving), or \"low-sodium\" (less than 140 mg of sodium per serving). Foods labeled \"reduced-sodium\" and \"light sodium\" may still have too much sodium. Be sure to read the label to see how much sodium you are getting. · Buy fresh vegetables, or frozen vegetables without added sauces.  Buy low-sodium versions of canned vegetables, soups, and other canned goods. Prepare low-sodium meals  · Cut back on the amount of salt you use in cooking. This will help you adjust to the taste. Do not add salt after cooking. One teaspoon of salt has about 2,300 mg of sodium. · Take the salt shaker off the table. · Flavor your food with garlic, lemon juice, onion, vinegar, herbs, and spices. Do not use soy sauce, lite soy sauce, steak sauce, onion salt, garlic salt, celery salt, mustard, or ketchup on your food. · Use low-sodium salad dressings, sauces, and ketchup. Or make your own salad dressings and sauces without adding salt. · Use less salt (or none) when recipes call for it. You can often use half the salt a recipe calls for without losing flavor. Other foods such as rice, pasta, and grains do not need added salt. · Rinse canned vegetables, and cook them in fresh water. This removes some-but not all-of the salt. · Avoid water that is naturally high in sodium or that has been treated with water softeners, which add sodium. Call your local water company to find out the sodium content of your water supply. If you buy bottled water, read the label and choose a sodium-free brand. Avoid high-sodium foods  · Avoid eating:  ¨ Smoked, cured, salted, and canned meat, fish, and poultry. ¨ Ham, do, hot dogs, and luncheon meats. ¨ Regular, hard, and processed cheese and regular peanut butter. ¨ Crackers with salted tops, and other salted snack foods such as pretzels, chips, and salted popcorn. ¨ Frozen prepared meals, unless labeled low-sodium. ¨ Canned and dried soups, broths, and bouillon, unless labeled sodium-free or low-sodium. ¨ Canned vegetables, unless labeled sodium-free or low-sodium. ¨ Western Domi fries, pizza, tacos, and other fast foods. ¨ Pickles, olives, ketchup, and other condiments, especially soy sauce, unless labeled sodium-free or low-sodium. Where can you learn more?   Go to http://monica-kaitlin.info/. Enter O189 in the search box to learn more about \"Low Sodium Diet (2,000 Milligram): Care Instructions. \"  Current as of: May 12, 2017  Content Version: 11.4  © 5663-9626 [employer]. Care instructions adapted under license by [employer] (which disclaims liability or warranty for this information). If you have questions about a medical condition or this instruction, always ask your healthcare professional. [place] [address] any warranty or liability for your use of this information. Learning About Diabetes Food Guidelines  Your Care Instructions    Meal planning is important to manage diabetes. It helps keep your blood sugar at a target level (which you set with your doctor). You don't have to eat special foods. You can eat what your family eats, including sweets once in a while. But you do have to pay attention to how often you eat and how much you eat of certain foods. You may want to work with a dietitian or a certified diabetes educator (CDE) to help you plan meals and snacks. A dietitian or CDE can also help you lose weight if that is one of your goals. What should you know about eating carbs? Managing the amount of carbohydrate (carbs) you eat is an important part of healthy meals when you have diabetes. Carbohydrate is found in many foods. · Learn which foods have carbs. And learn the amounts of carbs in different foods. ¨ Bread, cereal, pasta, and rice have about 15 grams of carbs in a serving. A serving is 1 slice of bread (1 ounce), ½ cup of cooked cereal, or 1/3 cup of cooked pasta or rice. ¨ Fruits have 15 grams of carbs in a serving. A serving is 1 small fresh fruit, such as an apple or orange; ½ of a banana; ½ cup of cooked or canned fruit; ½ cup of fruit juice; 1 cup of melon or raspberries; or 2 tablespoons of dried fruit. ¨ Milk and no-sugar-added yogurt have 15 grams of carbs in a serving.  A serving is 1 cup of milk or 2/3 cup of no-sugar-added yogurt. ¨ Starchy vegetables have 15 grams of carbs in a serving. A serving is ½ cup of mashed potatoes or sweet potato; 1 cup winter squash; ½ of a small baked potato; ½ cup of cooked beans; or ½ cup cooked corn or green peas. · Learn how much carbs to eat each day and at each meal. A dietitian or CDE can teach you how to keep track of the amount of carbs you eat. This is called carbohydrate counting. · If you are not sure how to count carbohydrate grams, use the Plate Method to plan meals. It is a good, quick way to make sure that you have a balanced meal. It also helps you spread carbs throughout the day. ¨ Divide your plate by types of foods. Put non-starchy vegetables on half the plate, meat or other protein food on one-quarter of the plate, and a grain or starchy vegetable in the final quarter of the plate. To this you can add a small piece of fruit and 1 cup of milk or yogurt, depending on how many carbs you are supposed to eat at a meal.  · Try to eat about the same amount of carbs at each meal. Do not \"save up\" your daily allowance of carbs to eat at one meal.  · Proteins have very little or no carbs per serving. Examples of proteins are beef, chicken, turkey, fish, eggs, tofu, cheese, cottage cheese, and peanut butter. A serving size of meat is 3 ounces, which is about the size of a deck of cards. Examples of meat substitute serving sizes (equal to 1 ounce of meat) are 1/4 cup of cottage cheese, 1 egg, 1 tablespoon of peanut butter, and ½ cup of tofu. How can you eat out and still eat healthy? · Learn to estimate the serving sizes of foods that have carbohydrate. If you measure food at home, it will be easier to estimate the amount in a serving of restaurant food. · If the meal you order has too much carbohydrate (such as potatoes, corn, or baked beans), ask to have a low-carbohydrate food instead. Ask for a salad or green vegetables.   · If you use insulin, check your blood sugar before and after eating out to help you plan how much to eat in the future. · If you eat more carbohydrate at a meal than you had planned, take a walk or do other exercise. This will help lower your blood sugar. What else should you know? · Limit saturated fat, such as the fat from meat and dairy products. This is a healthy choice because people who have diabetes are at higher risk of heart disease. So choose lean cuts of meat and nonfat or low-fat dairy products. Use olive or canola oil instead of butter or shortening when cooking. · Don't skip meals. Your blood sugar may drop too low if you skip meals and take insulin or certain medicines for diabetes. · Check with your doctor before you drink alcohol. Alcohol can cause your blood sugar to drop too low. Alcohol can also cause a bad reaction if you take certain diabetes medicines. Follow-up care is a key part of your treatment and safety. Be sure to make and go to all appointments, and call your doctor if you are having problems. It's also a good idea to know your test results and keep a list of the medicines you take. Where can you learn more? Go to http://monica-kaitlin.info/. Enter W246 in the search box to learn more about \"Learning About Diabetes Food Guidelines. \"  Current as of: March 13, 2017  Content Version: 11.4  © 3501-2941 [employer]. Care instructions adapted under license by [employer] (which disclaims liability or warranty for this information). If you have questions about a medical condition or this instruction, always ask your healthcare professional. [place] [address] any warranty or liability for your use of this information.

## 2018-05-09 NOTE — PROGRESS NOTES
Neurology Progress Note     NAME: Michael Oro   :  1955   MRN:  [id number]   DATE:  2018    Assessment:     Principal Problem:    Seizure ([address] (2018)      Pt is a 58yo RH female with hypertension and poorly controlled diabetes, not on an antiplatelet agent at home, presenting with probable focal onset seizure involving her left side, with subsequent weakness on the left side greater than normal, without loss of awareness or loss of consciousness reported, found to have a large area of encephalomalacia in the right MCA distribution, and a probable incidental punctate infarct in the right basal ganglia. Left sided weakness is improved, mild weakness still seen. 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       Plan:   -Continue Keppra 250mg in AM and 500mg in PM (decreased from 500mg bid due to hypersomnolence.)  -Continue ASA 81mg daily  -Maximize medical management of HTN and DM.  -Home OT/PT  -F/u in clinic with me in 4-6 weeks. Subjective:   Pt is doing well, much more alert today. No seizures. Had a fall yesterday due to left sided weakness without insight to limitations, no injury. Ready to go home.      Objective:   Chart reviewed since last seen  Current Facility-Administered Medications   Medication Dose Route Frequency    acetaminophen (TYLENOL) tablet 650 mg  650 mg Oral Q6H PRN    insulin lispro (HUMALOG) injection   SubCUTAneous AC&HS    levETIRAcetam (KEPPRA) tablet 250 mg  250 mg Oral 7am    levETIRAcetam (KEPPRA) tablet 500 mg  500 mg Oral PCD    metoprolol tartrate (LOPRESSOR) tablet 12.5 mg  12.5 mg Oral BID    pantoprazole (PROTONIX) 40 mg in sodium chloride 0.9% 10 mL injection  40 mg IntraVENous DAILY    glucose chewable tablet 16 g  4 Tab Oral PRN  dextrose (D50W) injection syrg 12.5-25 g  12.5-25 g IntraVENous PRN    glucagon (GLUCAGEN) injection 1 mg  1 mg IntraMUSCular PRN    sodium chloride (NS) flush 5-10 mL  5-10 mL IntraVENous Q8H    sodium chloride (NS) flush 5-10 mL  5-10 mL IntraVENous PRN    sodium chloride (NS) flush 5-10 mL  5-10 mL IntraVENous Q8H    sodium chloride (NS) flush 5-10 mL  5-10 mL IntraVENous PRN    enoxaparin (LOVENOX) injection 40 mg  40 mg SubCUTAneous Q24H    labetalol (NORMODYNE;TRANDATE) injection 10 mg  10 mg IntraVENous Q6H PRN    insulin glargine (LANTUS) injection 30 Units  30 Units SubCUTAneous DAILY    lisinopril (PRINIVIL, ZESTRIL) tablet 40 mg  40 mg Oral DAILY    And    hydroCHLOROthiazide (HYDRODIURIL) tablet 50 mg  50 mg Oral DAILY    aspirin delayed-release tablet 81 mg  81 mg Oral DAILY    ondansetron (ZOFRAN) injection 4 mg  4 mg IntraVENous Q4H PRN       Visit Vitals    /65 (BP 1 Location: Right arm, BP Patient Position: Post activity)    Pulse 80    Temp 98 °F (36.7 °C)    Resp 20    Ht 5' 1\" (1.549 m)    Wt 47.4 kg (104 lb 6.4 oz)    SpO2 98%    Breastfeeding No    BMI 19.73 kg/m2     Temp (24hrs), Av °F (36.7 °C), Min:97.2 °F (36.2 °C), Max:98.8 °F (37.1 °C)                Physical Exam:  General: Well developed well nourished patient in no apparent distress. Cardiac: Regular rate and rhythm with no murmurs. Extremities: 2+ Radial pulses, no cyanosis or edema    Neurological Exam:  Mental Status: Alert, follows commands, appropriate. No dysarthria, able to say \"I don't remember\" and \"thank you. \"   Cranial Nerves:   EOMI, no nystagmus, no ptosis. Facial movement is symmetric. Motor:  5/5 strength on right, 5-/5 left /bicep,HF. Normal bulk and tone. . No tremors   Reflexes:   Deep tendon reflexes 2+ and symmetric.     Sensory:      Gait:     Cerebellar:           Lab Review   Recent Results (from the past 24 hour(s))   GLUCOSE, POC    Collection Time: 18 11:41 AM Result Value Ref Range    Glucose (POC) 238 (H) 65 - 100 mg/dL    Performed by Brooklyn Jacobson, POC    Collection Time: 05/08/18  4:36 PM   Result Value Ref Range    Glucose (POC) 145 (H) 65 - 100 mg/dL    Performed by Elaine Ratliff    GLUCOSE, POC    Collection Time: 05/08/18  9:04 PM   Result Value Ref Range    Glucose (POC) 238 (H) 65 - 100 mg/dL    Performed by Ari Michel*    GLUCOSE, POC    Collection Time: 05/09/18  7:34 AM   Result Value Ref Range    Glucose (POC) 163 (H) 65 - 100 mg/dL    Performed by Dylan Lentz        Additional comments:  I have reviewed the patient's new clinical lab test results. I have personally reviewed the patient's radiographs. MRI   MRI Results (most recent):    Results from [facility] encounter on 05/06/18   MRI BRAIN WO CONT   Narrative EXAM:  MRI BRAIN WO CONT  Clinical history: Seizure  INDICATION:  Seizure    COMPARISON: CT 5/6/2018. CONTRAST:  None. TECHNIQUE: Sagittal T1, axial FLAIR, T2,T1 and gradient echo images as well as  coronal T2 weighted images and axial diffusion weighted images of the head were  obtained. FINDINGS:     Moderate to large right frontal and right parietal infarction with  encephalomalacia also identified in the right temporal lobe. Confluent  periventricular and scattered foci of increased T2 signal intensity in the  corona radiata and centrum semiovale. Cavernous sinuses are symmetric. Left  maxillary sinus disease. Mild left frontal sinus disease. Sulcal and ventricular  prominence. Normal appearing flow-voids are present in the vertebral, basilar  and carotid artery systems. The craniocervical junction is normal.         Impression IMPRESSION:   Punctate focus of acute infarction in the right basal ganglia. Moderate to large area of encephalomalacia in the right parietal, frontal and  temporal lobes consistent with large remote MCA territory infarction.     Mild chronic microvascular ischemic change and moderate cerebral atrophy. Left maxillary sinus disease. The findings were called to [place] on 5/7/2018 at 2:35 PM by Dr. Darin Rollins. [address] discussed with:  Patient x   Family    RN x   Care Manager    Hospitalist:  lakeshia     Signed: Chrystine Gaucher, MD

## 2018-05-09 NOTE — PROGRESS NOTES
I have reviewed discharge instructions with the patient and daughter. The patient and daughter verbalized understanding. Bedside RN performed patient education and medication education. Discharge concerns initiated and discussed with patient, including clarification on \"who\" assists the patient at their home and instructions for when the home going patient should call their provider after discharge. Opportunity for questions and clarification was provided. Patient receptive to education: YES  Patient stated: Patria Melissa Understanding  Barriers to Education: Language Barrier  Diagnosis Education given:  YES    Length of stay: 4  Expected Day of Discharge: 5/9/2018  Ask if they have \"Help at Home\" & add to white board?   YES    Education Day #: 4    Medication Education Given:  YES  M in the box Medication name: Aspirin, Keppra, Metoprolol    Pt aware of HCAHPS survey: YES

## 2018-05-09 NOTE — PROGRESS NOTES
NUTRITION education brief         Pt visited this morning for DM education. Pt eating breakfast during visit. Drowsy and language barrier noted. Handouts for DM diet left at bedside for family since plans for d/c with home health later today. Predict elevated BG related more to non-compliance of medications. MD discussed importance of adherence with daughter. Recommend referral to outpatient diabetes treatment center for further diet/medication education as needed.        Prince Musa RD

## 2018-05-09 NOTE — INTERDISCIPLINARY ROUNDS
IDR/SLIDR Summary          Patient: Milla Kerns MRN: [id number]    Age: 60 y.o. YOB: 1955 Room/Bed: [facility]   Admit Diagnosis: Seizure ([facility]  Principal Diagnosis: Seizure ([facility]   Goals: safety  Readmission: NO  Quality Measure:   VTE Prophylaxis: Chemical  Influenza Vaccine screening completed? YES  Pneumococcal Vaccine screening completed? NO  Mobility needs: Yes   Nutrition plan:No  Consults:    Financial concerns:No  Escalated to CM? RRAT Score: 10   Interventions:  Testing due for pt today?  NO  LOS: 3 days Expected length of stay  days  Discharge plan:    PCP: Jordyn Lugo MD  Transportation needs: Yes    Days before discharge:one day until discharge   Discharge disposition:     Signed:     Prisca Lan  5/9/2018  5:09 AM

## 2018-05-09 NOTE — INTERDISCIPLINARY ROUNDS
IDR/SLIDR Summary          Patient: Justo Parrish MRN: [id number]    Age: 60 y.o. YOB: 1955 Room/Bed: [facility]   Admit Diagnosis: Seizure ([facility]  Principal Diagnosis: Seizure ([facility]   Goals: Discharge Today with [facility]  Readmission: NO  Quality Measure: Not applicable  VTE Prophylaxis: Chemical  Influenza Vaccine screening completed? YES  Pneumococcal Vaccine screening completed? NO  Mobility needs: Yes   Nutrition plan:Yes  Consults:P.T, O.T. and Case Management    Financial concerns:No  Escalated to CM? YES  RRAT Score: 10   Interventions:Home Health  Testing due for pt today?  NO  LOS: 4 days Expected length of stay 4 days  Discharge plan: Home with daughter and [facility]   PCP: Juvenal Brown MD  Transportation needs: No    Days before discharge:ready for discharge  Discharge disposition: Home with daughter and     Signed:     Jillian Gore  5/9/2018  8:09 AM

## 2018-05-09 NOTE — TELEPHONE ENCOUNTER
Pt saw Dr. Ba Rodriguez in the hospital and the hospital called to make 1 month f/u. Pt is scheduled for the next available, which is July 26th. They would like the pt to be seen sooner.

## 2018-05-09 NOTE — PROGRESS NOTES
The documentation for this period is being entered following the guidelines as defined in the [facility] downtime policy by Lefty Calhoun.

## 2018-05-09 NOTE — PROGRESS NOTES
0730 Bedside shift change report given to [address] [place] [address] Street (oncoming nurse) by Unique Aguirre (offgoing nurse). Report included the following information SBAR, Kardex, Intake/Output, MAR and Cardiac Rhythm NSR.

## 2018-05-09 NOTE — PROGRESS NOTES
The CM called and spoke with Rui Li with [facility]- agency has accepted patient as andrey, will begin services on 5/12. The CM called and spoke with Suzie, the patient's daughter, and she confirmed that the patient lives in her home with her. The family verbalized understanding that [facility] will begin 5/12. The patient's daughter will provide transportation upon discharge. CM will continue to follow. LANNY Medina CM provided patient with coupons for Lopressor and Keppra- Ilan Atkins is $14.63 at [address] is on the $4.00 list at [facility]- also provided coupon for [employer] for $7.38. CM provided list of financial resources- discussed with RN and placed on chart. LANNY Medina CM discussed with OT regarding a walker for the patient- will contact PT to receive DME for patient. LANNY Medina CM spoke with attending MD- patient receives financial assistance for insulin through [address] [place] [address] provided patient with list of DME for walker for purchase (PT stated it would be more expensive to order vs. Buying out-of-pocket since patient is uninsured). No other needs identified. LANNY Medina    11:51 a.m.- LUIZ called and spoke with the patient's daughter, Suzie, and she plans to be at the hospital at approximately 1:00 p.m. Suzie had questions regarding a bedside commode for patient- LUIZ explained that insurance does not cover (and patient is self-pay) however, the CM has a DME list that will be provided at bedside for walker and commode if needed, LUIZ explained that family can call company and find DME at lower costs- especially at "[employer] The family verbalized understanding. LANNY Medina     13:35 p.m.- LUIZ met with patient and daughter/son-in-law at bedside, CM provided coupons for medications- patient has follow-up appointment scheduled for [address] [place] [address].  The CM also provided list of pharmaceutical and financial resources at bedside. The CM also provided list of DME to purchase at [facility] and other [address] [place] [address] supplies.  Lavonda Phoenix, MSW

## 2018-05-09 NOTE — DISCHARGE SUMMARY
Discharge Summary     Patient:  Brendan Kirby       MRN: [id number]       YOB: 1955       Age: 60 y.o. Date of admission:  5/6/2018    Date of discharge:  5/9/2018    Primary care provider: Dr. Lidia Malhotra MD    Admitting provider:  Camila Sprague MD    Discharging provider:  David Hutchison MD - [phone]  If unavailable, call [phone] and ask the  to page the triage hospitalist.    Consultations  · IP CONSULT TO NEUROLOGY  · IP CONSULT TO NEUROLOGY    Procedures  · * No surgery found *    Discharge destination: HOME with [facility]. The patient is stable for discharge. Admission diagnosis  · Seizure [facility])    Current Discharge Medication List      START taking these medications    Details   aspirin delayed-release 81 mg tablet Take 1 Tab by mouth daily. Qty: 30 Tab, Refills: 1      metoprolol tartrate (LOPRESSOR) 25 mg tablet Take 0.5 Tabs by mouth two (2) times a day. Qty: 30 Tab, Refills: 1      levETIRAcetam (KEPPRA) 250 mg tablet Take 1 PO QAM and 2 Tabs qPM  Qty: 90 Tab, Refills: 3         CONTINUE these medications which have CHANGED    Details   insulin detemir U-100 (LEVEMIR FLEXTOUCH) 100 unit/mL (3 mL) inpn 54 Units by SubCUTAneous route Daily (before breakfast). Indications: PAP medication  Qty: 6 Pen, Refills: 1         CONTINUE these medications which have NOT CHANGED    Details   metFORMIN ER (GLUCOPHAGE XR) 500 mg tablet Take 1 in the morning and 2 at night for 1 week and then 2 in the morning and 2 at night. Qty: 120 Tab, Refills: 5    Associated Diagnoses: Type 2 diabetes mellitus without complication, with long-term current use of insulin (HCC)      omeprazole (PRILOSEC) 20 mg capsule Take 1 Cap by mouth daily as needed. For stomach pain.   Qty: 30 Cap, Refills: 3    Associated Diagnoses: Dyspepsia      lisinopril-hydroCHLOROthiazide (PRINZIDE, ZESTORETIC) 20-25 mg per tablet Take 2 Tabs by mouth daily. Qty: 60 Tab, Refills: 5    Associated Diagnoses: Essential hypertension with goal blood pressure less than 140/90      Insulin Syringe-Needle U-100 1 mL 31 gauge x 5/16 syrg Use to administer insulin as directed. Qty: 30 Syringe, Refills: 3    Associated Diagnoses: Uncontrolled type 2 diabetes mellitus without complication, with long-term current use of insulin (HCC)      zolpidem (AMBIEN) 5 mg tablet TAKE ONE TABLET BY MOUTH NIGHTLY AS NEEDED FOR SLEEP**MAX DAILY AMOUNT: 5 MG**  Qty: 30 Tab, Refills: 0    Associated Diagnoses: Insomnia, unspecified type             Follow-up Information     Follow up With Details Comments Contact Info    Digna Garcia MD On 5/11/2018 Hospital f/u PCP on Friday, 5/11/18 @ 2:15 p.m.      [address] will call to schedule a visit on 5/12/18. If no call after 12 pm, call [phone]. [facility] [address] [facility]    Sammy Campoverde MD In 1 month Discharge follow up  [address] [facility]  [phone]            Final discharge diagnoses and brief hospital course  Please also refer to the admission H&P for details on the presenting problem. 60 y.o lady with DM and HTN who presented after having seizure-like activity    Seizure:likely due to hx of CVA  - CT of the head noted an old stroke.   - MRI Brain reviewed  -continue Keppra but decreased to 250mg AM and 500mg PM due to excessive fatigue and drowsiness   -neuro consult, input appreciated, outpatient follow up in 1 month  - EEG: normal     Acute Rt Basal Ganglia and Large Chronic Rt MCA CVA  - on ASA  - US carotid and ECHO reviewed and WNL  - Neurology input appreciated     Type 2 DM, uncontrolled w/ hyperglycemia: a1c 13.4%  - resume Levemir qAM  -DTC consult     HTN urgency: resolving   -resume Lisinopril and Hydrodiuril  - Add Lopressor BID  - BP Much better     Renal insufficiency: (POA) chronicity unclear; last value available from 2016.   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 No other associated sx.   -monitor; if persists/worsens will obtain additional work-up       FOLLOW-UP TESTS recommended:   - New Medication: Keppra for seizures  - Take Aspirin 81mg daily  - Check Blood glucose 2-3 times daily before Breakfast/Lunch/Dinner     PENDING TEST RESULTS:  At the time of your discharge the following test results are still pending: NONE  Please make sure you review these results with your outpatient follow-up provider(s).     SYMPTOMS to watch for: chest pain, shortness of breath, fever, chills, nausea, vomiting, diarrhea, change in mentation, falling, weakness, bleeding.     DIET/what to eat:  Diabetic Diet     ACTIVITY:  Activity as tolerated     WOUND CARE: NONE     EQUIPMENT needed:  NONE       Physical examination at discharge  Visit Vitals    /65 (BP 1 Location: Right arm, BP Patient Position: Post activity)    Pulse 80    Temp 98 °F (36.7 °C)    Resp 20    Ht 5' 1\" (1.549 m)    Wt 47.4 kg (104 lb 6.4 oz)    SpO2 98%    Breastfeeding No    BMI 19.73 kg/m2     Ao3  PERRLA  EOMI  Clear speech  LLE 3-4/5  B/l UE: 5/5  Sensory intact  Lung: CTA  CVS: RRR  Abd: soft NT ND  Ext: NO edema    Pertinent imaging studies:    MRI Brain:  IMPRESSION:   Punctate focus of acute infarction in the right basal ganglia.     Moderate to large area of encephalomalacia in the right parietal, frontal and  temporal lobes consistent with large remote MCA territory infarction.     Mild chronic microvascular ischemic change and moderate cerebral atrophy. Left maxillary sinus disease. ECHO:  SUMMARY:  Left ventricle: The cavity was small.  Systolic function was normal.  Ejection fraction was estimated in the range of 55 % to 60 %. There were  no regional wall motion abnormalities. Carotid Duplex:  IMPRESSION:  Consistent with less than 50% stenosis of the right  internal carotid and less than 50% stenosis of the left internal  carotid. Vertebrals are patent with antegrade flow. Recent Labs      05/06/18 1951   WBC  11.9*   HGB  15.1   HCT  46.1   PLT  589*     Recent Labs      05/08/18   0454  05/06/18 1951   NA  141  138   K  3.5  4.2   CL  108  106   CO2  26  19*   BUN  15  20   CREA  1.09*  1.39*   GLU  132*  239*   CA  8.7  9.2   MG  1.9   --    PHOS  4.6   --      Recent Labs      05/06/18 1951   SGOT  24   AP  109   TP  8.6*   ALB  3.8   GLOB  4.8*     No results for input(s): INR, PTP, APTT in the last 72 hours. No lab exists for component: INREXT   No results for input(s): FE, TIBC, PSAT, FERR in the last 72 hours. No results for input(s): PH, PCO2, PO2 in the last 72 hours. No results for input(s): CPK, CKMB in the last 72 hours.     No lab exists for component: TROPONINI  No components found for: [place]    Chronic Diagnoses:    Problem List as of 5/9/2018  Date Reviewed: 7/20/2017          Codes Class Noted - Resolved    * (Principal)Seizure ([facility] ICD-10-CM: R56.9  ICD-9-CM: 780.39  5/6/2018 - Present        Essential hypertension with goal blood pressure less than 140/90 ICD-10-CM: I10  ICD-9-CM: 401.9  9/13/2016 - Present        Osteoarthritis of right knee ICD-10-CM: M17.11  ICD-9-CM: 715.96  8/28/2014 - Present        Illiteracy ICD-10-CM: Z55.0  ICD-9-CM: V62.3  2/4/2014 - Present        Dyspepsia ICD-10-CM: R10.13  ICD-9-CM: 536.8  2/4/2014 - Present        Diabetes mellitus type 2, controlled ([facility] ICD-10-CM: E11.9  ICD-9-CM: 250.00  2/18/2013 - Present        RESOLVED: Dietary surveillance and counseling ICD-10-CM: Z71.3  ICD-9-CM: V65.3  2/18/2013 - 3/27/2013        RESOLVED: Pain in limb ICD-10-CM: M79.609  ICD-9-CM: 729.5  2/18/2013 - 3/27/2013              Time spent on discharge related activities today greater than 30 minutes.       Signed:  Zenon Edmondson MD                 Hospitalist, Internal Medicine      Cc: Caio Givens MD

## 2018-05-09 NOTE — PROGRESS NOTES
Spiritual Care Partner Volunteer visited patient in [address] [place] [address] on 5/09/18.   Documented by:  Chaplain Grewal MDiv, MS, [address] ([id number])

## 2018-05-09 NOTE — PROGRESS NOTES
Bedside and Verbal shift change report given to Gilbert Lay (oncoming nurse) by Karina Negrete (offgoing nurse). Report included the following information SBAR, Kardex, MAR and Recent Results.

## 2018-05-10 NOTE — TELEPHONE ENCOUNTER
Will you approve opening a slot or we can put in in with another provider. Please advise. You have nothing available until the end of July which the pt has taken.

## 2018-05-10 NOTE — TELEPHONE ENCOUNTER
Denise Byrne - July should be fine. Please put her on cancellation list and let her daughter know to call if there are in questions or problems with meds or seizures prior to her f/u appt.

## 2018-05-11 ENCOUNTER — OFFICE VISIT (OUTPATIENT)
Dept: FAMILY MEDICINE CLINIC | Age: 63
End: 2018-05-11

## 2018-05-11 ENCOUNTER — HOSPITAL ENCOUNTER (OUTPATIENT)
Dept: LAB | Age: 63
Discharge: HOME | End: 2018-05-11

## 2018-05-11 VITALS
SYSTOLIC BLOOD PRESSURE: 139 MMHG | HEART RATE: 85 BPM | TEMPERATURE: 98.7 F | BODY MASS INDEX: 21.54 KG/M2 | DIASTOLIC BLOOD PRESSURE: 76 MMHG | WEIGHT: 114 LBS

## 2018-05-11 DIAGNOSIS — I10 ESSENTIAL HYPERTENSION WITH GOAL BLOOD PRESSURE LESS THAN 140/90: ICD-10-CM

## 2018-05-11 DIAGNOSIS — E11.21 TYPE 2 DIABETES WITH NEPHROPATHY (HCC): ICD-10-CM

## 2018-05-11 DIAGNOSIS — M17.0 PRIMARY OSTEOARTHRITIS OF BOTH KNEES: ICD-10-CM

## 2018-05-11 DIAGNOSIS — Z09 HOSPITAL DISCHARGE FOLLOW-UP: Primary | ICD-10-CM

## 2018-05-11 DIAGNOSIS — G83.84 TODD'S PARALYSIS (HCC): ICD-10-CM

## 2018-05-11 DIAGNOSIS — Z09 HOSPITAL DISCHARGE FOLLOW-UP: ICD-10-CM

## 2018-05-11 DIAGNOSIS — R56.9 SEIZURE (HCC): ICD-10-CM

## 2018-05-11 DIAGNOSIS — Z86.73 HISTORY OF CVA (CEREBROVASCULAR ACCIDENT): ICD-10-CM

## 2018-05-11 LAB
ANION GAP SERPL CALC-SCNC: 11 MMOL/L (ref 5–15)
BUN SERPL-MCNC: 16 MG/DL (ref 6–20)
BUN/CREAT SERPL: 14 (ref 12–20)
CALCIUM SERPL-MCNC: 8.9 MG/DL (ref 8.5–10.1)
CHLORIDE SERPL-SCNC: 100 MMOL/L (ref 97–108)
CO2 SERPL-SCNC: 28 MMOL/L (ref 21–32)
CREAT SERPL-MCNC: 1.14 MG/DL (ref 0.55–1.02)
GLUCOSE POC: NORMAL MG/DL
GLUCOSE SERPL-MCNC: 55 MG/DL (ref 65–100)
POTASSIUM SERPL-SCNC: 4.8 MMOL/L (ref 3.5–5.1)
SODIUM SERPL-SCNC: 139 MMOL/L (ref 136–145)

## 2018-05-11 PROCEDURE — 80048 BASIC METABOLIC PNL TOTAL CA: CPT | Performed by: FAMILY MEDICINE

## 2018-05-11 NOTE — TELEPHONE ENCOUNTER
LM on VM that we have added them to cancellation list, and to call us if they have any questions. Otherwise we will see them on 7/26.

## 2018-05-11 NOTE — PROGRESS NOTES
I sent to [facility] a message with new insulin order from Dr. Terry Nichols: insulin detemir U-100 (LEVEMIR FLEXTOUCH) 100 unit/mL (3 mL) inpn [[id number]]   Order Details   Dose: 57 Units Route: SubCUTAneous Frequency: DAILY BEFORE BREAKFAST   Indications of Use: PAP medication   Dispense Quantity:  6 Pen Refills:  1 Fills Remaining:  --           Si Units by SubCUTAneous route Daily (before breakfast). Indications: PAP medication          Written Date:  18 Expiration Date:  --     Start Date:  18 End Date:  --            Ordering Provider:  -- DUNCAN #:  -- NPI:  --    Authorizing Provider:  Laura Nur MD DUNCAN #:  [id number] NPI:  [id number]    Ordering User:  Laura Nur MD           Pt given 2 vials of stock Humulin 70/30 to take 25 units before breakfast and 25 units before dinner. Family is now administering her insulin. I explained to family member, Gerda Hernandez, to not let her run out of insulin and to call [facility] before she runs out to check on status of PAP insulin.  Mindy Roach RN

## 2018-05-11 NOTE — MR AVS SNAPSHOT
[address] [address] 
[phone] Patient: Kyler Collazo MRN: [id number] DOF:[birth year] Visit Information Date & Time Provider Department Dept. Phone Encounter #  
 5/11/2018  2:15 PM Theresa Wall, [address] [phone] [id number] Follow-up Instructions Return in about 6 weeks (around 6/22/2018), or if symptoms worsen or fail to improve, for Regular Follow up. Your Appointments 7/26/2018 11:40 AM  
Follow Up with Piedad Kaur MD  
[facility] at [address] [place] [address]) Appt Note: f/u from hospital 5/9/2018 [address] [facility] [address] [facility] [address]  
[phone]  
  
   
 [address] [address] Upcoming Health Maintenance Date Due DTaP/Tdap/Td series (1 - Tdap) 6/5/1975 PAP AKA CERVICAL CYTOLOGY 6/5/1975 FOBT Q 1 YEAR AGE 50-75 6/5/2004 ZOSTER VACCINE AGE 60> 4/5/2014 FOOT EXAM Q1 6/8/2018 MICROALBUMIN Q1 6/8/2018 EYE EXAM RETINAL OR DILATED Q1 7/27/2018 Influenza Age 5 to Adult 8/1/2018 HEMOGLOBIN A1C Q6M 11/7/2018 LIPID PANEL Q1 5/7/2019 BREAST CANCER SCRN MAMMOGRAM 7/6/2019 Allergies as of 5/11/2018  Review Complete On: 5/11/2018 By: Theresa Wall MD  
 No Known Allergies Current Immunizations  Reviewed on 6/8/2017 Name Date Influenza Vaccine 1/28/2014 11:20 AM, 10/2/2012, 12/9/2011, 11/18/2010 Influenza Vaccine (Quad) PF 11/23/2015 Influenza Vaccine PF 1/21/2015 Pneumococcal Polysaccharide (PPSV-23) 6/8/2017 Not reviewed this visit You Were Diagnosed With   
  
 Codes Comments Hospital discharge follow-up    -  Primary ICD-10-CM: [address] ICD-9-CM: V67.59 Seizure ([address]     ICD-10-CM: R56.9 ICD-9-CM: 780.39  History of CVA (cerebrovascular accident)     ICD-10-CM: Z86.73 
ICD-9-CM: V12.54   
 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 
ICD-9-CM: 250.40, 583.81 Essential hypertension with goal blood pressure less than 140/90     ICD-10-CM: I10 
ICD-9-CM: 401.9 Primary osteoarthritis of both knees     ICD-10-CM: M17.0 ICD-9-CM: 715.16 Vitals BP Pulse Temp Weight(growth percentile) BMI OB Status 139/76 (BP 1 Location: Left arm, BP Patient Position: Sitting) 85 98.7 °F (37.1 °C) (Oral) 114 lb (51.7 kg) 21.54 kg/m2 Postmenopausal  
 Smoking Status Never Smoker Vitals History BMI and BSA Data Body Mass Index Body Surface Area  
 21.54 kg/m 2 1.49 m 2 Preferred Pharmacy Pharmacy Name Phone [address] [phone] Your Updated Medication List  
  
   
This list is accurate as of 5/11/18  3:25 PM.  Always use your most recent med list.  
  
  
  
  
 aspirin delayed-release 81 mg tablet Take 1 Tab by mouth daily. insulin detemir U-100 100 unit/mL (3 mL) Inpn Commonly known as:  LEVEMIR FLEXTOUCH  
57 Units by SubCUTAneous route Daily (before breakfast). Indications: PAP medication  
  
 insulin NPH/insulin regular 100 unit/mL (70-30) injection Commonly known as:  NOVOLIN 70/30, HUMULIN 70/30  
25 Units by SubCUTAneous route Before breakfast and dinner for 40 days. Given sample to use until able to get Levemir from PAP program  
  
 levETIRAcetam 250 mg tablet Commonly known as:  KEPPRA Take 1 PO QAM and 2 Tabs qPM  
  
 lisinopril-hydroCHLOROthiazide 20-25 mg per tablet Commonly known as:  Mindy Humphrey Take 2 Tabs by mouth daily. metFORMIN  mg tablet Commonly known as:  GLUCOPHAGE XR Take 1 in the morning and 2 at night for 1 week and then 2 in the morning and 2 at night. metoprolol tartrate 25 mg tablet Commonly known as:  LOPRESSOR  
 Take 0.5 Tabs by mouth two (2) times a day. omeprazole 20 mg capsule Commonly known as:  PRILOSEC Take 1 Cap by mouth daily as needed. For stomach pain. zolpidem 5 mg tablet Commonly known as:  AMBIEN  
TAKE ONE TABLET BY MOUTH NIGHTLY AS NEEDED FOR SLEEP**MAX DAILY AMOUNT: 5 MG**  
  
  
  
  
Prescriptions Printed Refills  
 insulin detemir U-100 (LEVEMIR FLEXTOUCH) 100 unit/mL (3 mL) inpn 1 Si Units by SubCUTAneous route Daily (before breakfast). Indications: PAP medication Class: Program  
 Route: SubCUTAneous Prescriptions Sent to Mail Order Refills  
 insulin detemir U-100 (LEVEMIR FLEXTOUCH) 100 unit/mL (3 mL) inpn 1 Si Units by SubCUTAneous route Daily (before breakfast). Indications: PAP medication Class: Program  
 Pharmacy: [facility] DR LORI BRUNSON [address] Ph #: [phone] Route: SubCUTAneous Prescriptions Sent to Pharmacy Refills  
 insulin detemir U-100 (LEVEMIR FLEXTOUCH) 100 unit/mL (3 mL) inpn 1 Si Units by SubCUTAneous route Daily (before breakfast). Indications: PAP medication Class: Program  
 Pharmacy: [facility] DR LORI QUINN[address] Ph #: [phone] Route: SubCUTAneous We Performed the Following AMB POC GLUCOSE BLOOD, BY GLUCOSE MONITORING DEVICE [92937 CPT(R)] Follow-up Instructions Return in about 6 weeks (around 2018), or if symptoms worsen or fail to improve, for Regular Follow up. To-Do List   
 2018 Imaging:  XR KNEE LT MAX 2 VWS   
  
 2018 Imaging:  XR KNEE RT MAX 2 VWS Introducing [facility]! [employer] introduces [employer] patient portal. Now you can access parts of your medical record, email your doctor's office, and request medication refills online. 1. In your internet browser, go to [email or web address] 2. Click on the First Time User? Click Here link in the Sign In box. You will see the New Member Sign Up page. 3. Enter your [employer] Access Code exactly as it appears below. You will not need to use this code after youve completed the sign-up process. If you do not sign up before the expiration date, you must request a new code. · [employer] Access Code: [email or web address] Expires: 8/4/2018  7:27 PM 
 
4. Enter the last four digits of your Social Security Number (xxxx) and Date of Birth (mm/dd/yyyy) as indicated and click Submit. You will be taken to the next sign-up page. 5. Create a [employer] ID. This will be your [employer] login ID and cannot be changed, so think of one that is secure and easy to remember. 6. Create a [employer] password. You can change your password at any time. 7. Enter your Password Reset Question and Answer. This can be used at a later time if you forget your password. 8. Enter your e-mail address. You will receive e-mail notification when new information is available in [employer]. 9. Click Sign Up. You can now view and download portions of your medical record. 10. Click the Download Summary menu link to download a portable copy of your medical information. If you have questions, please visit the Frequently Asked Questions section of the [employer] website. Remember, [employer] is NOT to be used for urgent needs. For medical emergencies, dial 911. Now available from your iPhone and Android! Please provide this summary of care documentation to your next provider. Your primary care clinician is listed as Subhash Crum. If you have any questions after today's visit, please call [phone].

## 2018-05-11 NOTE — PROGRESS NOTES
Subjective:    [facility] NOTE       Chief Complaint   Patient presents with   [facility] Follow Up        She  is a 60 y.o. female who presents for evaluation of: Son in law translates for pt    Here for hosp f/u d/c - admitted from 5/6/18-5/9/18 for CVA with seizure like activity. MRI brain on 5/7/18 showed:  \"Punctate focus of acute infarction in the right basal ganglia.   Moderate to large area of encephalomalacia in the right parietal, frontal and temporal lobes consistent with large remote MCA territory infarction. Mild chronic microvascular ischemic change and moderate cerebral atrophy. Left maxillary sinus disease. \"    She has ct to have uncontrolled DM with last A1C of 13.4% and controlled HTN. 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 \" by Dr. Mayi Omalley and is on Keppra. Remote CVA noted on CT on 5/6/18 - \"encephalomalacia in the right parietal/occipital and posterior temporal lobes most consistent with old infarct\"  Which was new since 5/2016. Note review suggest it may have been in 8/2016 when I saw her for a severe HA that had been going on for 4 days. She never got her CT scan after this visit as we discussed and on f/u with Dr. Arias July had HAs resolve though did have some left hand weakness on notes with him in 9/2016. Working with PT and OT.     Uncontrolled DM 2 - tells me that she may not have been taking the right insulin dose (ex, this am was taking 5 units instead of 54 units)    Lab Results   Component Value Date/Time    Hemoglobin A1c 13.4 (H) 05/07/2018 03:17 AM    Microalbumin/Creat ratio (mg/g creat) 7 06/08/2017 02:16 PM    Microalbumin,urine random 1.03 06/08/2017 02:16 PM    LDL,Direct 119 (H) 01/25/2013 03:10 PM    LDL, calculated 61.6 05/07/2018 03:17 AM    Creatinine (POC) 0.6 08/26/2010 08:05 PM    Creatinine 1.09 (H) 05/08/2018 04:54 AM      Lab Results   Component Value Date/Time    GFR est AA >60 05/08/2018 04:54 AM    GFR est non-AA 51 (L) 05/08/2018 04:54 AM      Lab Results   Component Value Date/Time    TSH 1.01 08/19/2016 11:06 AM         ROS  Gen - no fever/chills  Resp - no dyspnea or cough  CV - no chest pain or OLIVA  Rest per HPI    Past Medical History:   Diagnosis Date    Diabetes ([facility] [address]     Hypertension      No past surgical history on file. Current Outpatient Prescriptions on File Prior to Visit   Medication Sig Dispense Refill    aspirin delayed-release 81 mg tablet Take 1 Tab by mouth daily. 30 Tab 1    metoprolol tartrate (LOPRESSOR) 25 mg tablet Take 0.5 Tabs by mouth two (2) times a day. 30 Tab 1    levETIRAcetam (KEPPRA) 250 mg tablet Take 1 PO QAM and 2 Tabs qPM 90 Tab 3    metFORMIN ER (GLUCOPHAGE XR) 500 mg tablet Take 1 in the morning and 2 at night for 1 week and then 2 in the morning and 2 at night. 120 Tab 5    omeprazole (PRILOSEC) 20 mg capsule Take 1 Cap by mouth daily as needed. For stomach pain. 30 Cap 3    lisinopril-hydroCHLOROthiazide (PRINZIDE, ZESTORETIC) 20-25 mg per tablet Take 2 Tabs by mouth daily. 60 Tab 5    zolpidem (AMBIEN) 5 mg tablet TAKE ONE TABLET BY MOUTH NIGHTLY AS NEEDED FOR SLEEP**MAX DAILY AMOUNT: 5 MG** 30 Tab 0     No current facility-administered medications on file prior to visit.          Objective:     Vitals:    05/11/18 1437   BP: 139/76   Pulse: 85   Temp: 98.7 °F (37.1 °C)   TempSrc: Oral   Weight: 114 lb (51.7 kg)     Physical Examination:  General appearance - alert, well appearing, and in no distress  Eyes -sclera anicteric  Neck - supple, no significant adenopathy, no thyromegaly, no bruits  Chest - clear to auscultation, no wheezes, rales or rhonchi, symmetric air entry  Heart - normal rate, regular rhythm, normal S1, S2, no murmurs, rubs, clicks or gallops  Neurological - alert, oriented, no focal findings or movement disorder noted, 5-/5 strength in LUE and LLE, abnormal gait and balance  Extr - no edema    Assessment/ Plan:   Diagnoses and all orders for this visit:    1. Hospital discharge follow-up  -     METABOLIC PANEL, BASIC; Future    2. Seizure ([facility] [address]  3. History of CVA (cerebrovascular accident)  4. 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 Will work on DM and HTN control. Sig barriers including language, illiteracy, etc.   Family to help take over medication dispensing to help. 5. Type 2 diabetes with nephropathy (HCC) - samples of 70/30 at 25 units BID until able to get Levemir through PAP program and then will increase to Levemir 57 units once daily. Major issues with compliance discovered by family being addressed. -     AMB POC GLUCOSE BLOOD, BY GLUCOSE MONITORING DEVICE  -     insulin detemir U-100 (LEVEMIR FLEXTOUCH) 100 unit/mL (3 mL) inpn; 57 Units by SubCUTAneous route Daily (before breakfast). Indications: PAP medication  -     insulin NPH/insulin regular (NOVOLIN 70/30, HUMULIN 70/30) 100 unit/mL (70-30) injection; 25 Units by SubCUTAneous route Before breakfast and dinner for 40 days. Given sample to use until able to get Levemir from PAP program    6. Essential hypertension with goal blood pressure less than 140/90 - controlled, may work on more aggressive control    7. Primary osteoarthritis of both knees - xrays today and will consider steroid injections after this  -     XR KNEE LT MAX 2 VWS; Future  -     XR KNEE RT MAX 2 VWS; Future    I have discussed the diagnosis with the patient and the intended plan as seen in the above orders. The patient has received an after-visit summary and questions were answered concerning future plans. I have discussed medication side effects and warnings with the patient as well. The patient verbalizes understanding and agreement with the plan. Follow-up Disposition:  Return in about 6 weeks (around 6/22/2018), or if symptoms worsen or fail to improve, for Regular Follow up.

## 2018-05-11 NOTE — PROGRESS NOTES
Coordination of Care  1. Have you been to the ER, urgent care clinic since your last visit? Hospitalized since your last visit? Yes When: may 6th 2018 Where: [facility] Reason for visit: sergei    2. Have you seen or consulted any other health care providers outside of the [address] [place] [address] since your last visit? Include any pap smears or colon screening. No    Does the patient need refills?  YES    Learning Assessment Complete? yes    Results for orders placed or performed in visit on 05/11/18   AMB POC GLUCOSE BLOOD, BY GLUCOSE MONITORING DEVICE   Result Value Ref Range    Glucose POC  mg/dL

## 2018-05-13 ENCOUNTER — HOME CARE VISIT (OUTPATIENT)
Dept: SCHEDULING | Facility: HOME HEALTH | Age: 63
End: 2018-05-13
Payer: COMMERCIAL

## 2018-05-13 VITALS
RESPIRATION RATE: 18 BRPM | SYSTOLIC BLOOD PRESSURE: 132 MMHG | DIASTOLIC BLOOD PRESSURE: 80 MMHG | HEART RATE: 81 BPM | OXYGEN SATURATION: 97 % | BODY MASS INDEX: 19.73 KG/M2 | WEIGHT: 104.5 LBS | HEIGHT: 61 IN | TEMPERATURE: 97.8 F

## 2018-05-13 PROCEDURE — G0299 HHS/HOSPICE OF RN EA 15 MIN: HCPCS

## 2018-05-14 ENCOUNTER — TELEPHONE (OUTPATIENT)
Dept: FAMILY MEDICINE CLINIC | Age: 63
End: 2018-05-14

## 2018-05-14 ENCOUNTER — HOME CARE VISIT (OUTPATIENT)
Dept: SCHEDULING | Facility: HOME HEALTH | Age: 63
End: 2018-05-14
Payer: COMMERCIAL

## 2018-05-14 VITALS — DIASTOLIC BLOOD PRESSURE: 74 MMHG | OXYGEN SATURATION: 98 % | HEART RATE: 85 BPM | SYSTOLIC BLOOD PRESSURE: 128 MMHG

## 2018-05-14 PROCEDURE — G0151 HHCP-SERV OF PT,EA 15 MIN: HCPCS

## 2018-05-14 NOTE — PROGRESS NOTES
Spoke to Pt's daughter tu (see PHI) reviewed Dr Joaquin Maxwell message. Daughter reports pt's AM glucose have been - 5/11 = 93, 5/12 = 95, 5/13 = 137, 5/14 = 146. She reports pt is receiving PT and home health.   She is eating and drinking better than she was after discharge from the hospital

## 2018-05-14 NOTE — TELEPHONE ENCOUNTER
Alena from [address] asking if pt can take otc Tylenol for headaches.   Message being forwarded to Dr Kathy Kuhn and Charly Rubio, provider at [facility]

## 2018-05-14 NOTE — PROGRESS NOTES
Please call patient  her kidney function is slightly down from hospital discharge but relatively stable. Also her blood sugar was quite low at 55 the day we darren blood. We will need to keep an eye on her home sugars.

## 2018-05-15 ENCOUNTER — TELEPHONE (OUTPATIENT)
Dept: FAMILY MEDICINE CLINIC | Age: 63
End: 2018-05-15

## 2018-05-17 ENCOUNTER — HOME CARE VISIT (OUTPATIENT)
Dept: HOME HEALTH SERVICES | Facility: HOME HEALTH | Age: 63
End: 2018-05-17
Payer: COMMERCIAL

## 2018-05-17 ENCOUNTER — HOME CARE VISIT (OUTPATIENT)
Dept: SCHEDULING | Facility: HOME HEALTH | Age: 63
End: 2018-05-17
Payer: COMMERCIAL

## 2018-05-17 VITALS
RESPIRATION RATE: 16 BRPM | HEART RATE: 80 BPM | OXYGEN SATURATION: 98 % | TEMPERATURE: 97.4 F | SYSTOLIC BLOOD PRESSURE: 110 MMHG | DIASTOLIC BLOOD PRESSURE: 78 MMHG

## 2018-05-17 PROCEDURE — G0152 HHCP-SERV OF OT,EA 15 MIN: HCPCS

## 2018-05-17 PROCEDURE — G0299 HHS/HOSPICE OF RN EA 15 MIN: HCPCS

## 2018-05-18 ENCOUNTER — HOME CARE VISIT (OUTPATIENT)
Dept: SCHEDULING | Facility: HOME HEALTH | Age: 63
End: 2018-05-18
Payer: COMMERCIAL

## 2018-05-18 VITALS
DIASTOLIC BLOOD PRESSURE: 74 MMHG | TEMPERATURE: 98.3 F | HEART RATE: 58 BPM | SYSTOLIC BLOOD PRESSURE: 120 MMHG | OXYGEN SATURATION: 94 %

## 2018-05-18 VITALS — DIASTOLIC BLOOD PRESSURE: 70 MMHG | SYSTOLIC BLOOD PRESSURE: 110 MMHG | HEART RATE: 54 BPM | OXYGEN SATURATION: 97 %

## 2018-05-18 PROCEDURE — G0151 HHCP-SERV OF PT,EA 15 MIN: HCPCS

## 2018-05-21 ENCOUNTER — HOME CARE VISIT (OUTPATIENT)
Dept: HOME HEALTH SERVICES | Facility: HOME HEALTH | Age: 63
End: 2018-05-21
Payer: COMMERCIAL

## 2018-05-21 ENCOUNTER — HOME CARE VISIT (OUTPATIENT)
Dept: SCHEDULING | Facility: HOME HEALTH | Age: 63
End: 2018-05-21
Payer: COMMERCIAL

## 2018-05-21 PROCEDURE — G0155 HHCP-SVS OF CSW,EA 15 MIN: HCPCS

## 2018-05-23 ENCOUNTER — HOME CARE VISIT (OUTPATIENT)
Dept: SCHEDULING | Facility: HOME HEALTH | Age: 63
End: 2018-05-23
Payer: COMMERCIAL

## 2018-05-23 VITALS — SYSTOLIC BLOOD PRESSURE: 120 MMHG | HEART RATE: 94 BPM | DIASTOLIC BLOOD PRESSURE: 80 MMHG | OXYGEN SATURATION: 96 %

## 2018-05-23 PROCEDURE — G0152 HHCP-SERV OF OT,EA 15 MIN: HCPCS

## 2018-05-24 ENCOUNTER — HOME CARE VISIT (OUTPATIENT)
Dept: SCHEDULING | Facility: HOME HEALTH | Age: 63
End: 2018-05-24
Payer: COMMERCIAL

## 2018-05-24 VITALS — HEART RATE: 82 BPM | OXYGEN SATURATION: 97 % | SYSTOLIC BLOOD PRESSURE: 128 MMHG | DIASTOLIC BLOOD PRESSURE: 74 MMHG

## 2018-05-24 PROCEDURE — G0151 HHCP-SERV OF PT,EA 15 MIN: HCPCS

## 2018-06-15 ENCOUNTER — HOSPITAL ENCOUNTER (OUTPATIENT)
Dept: LAB | Age: 63
Discharge: HOME | End: 2018-06-15

## 2018-06-15 ENCOUNTER — OFFICE VISIT (OUTPATIENT)
Dept: FAMILY MEDICINE CLINIC | Age: 63
End: 2018-06-15

## 2018-06-15 VITALS
HEART RATE: 91 BPM | TEMPERATURE: 98.2 F | DIASTOLIC BLOOD PRESSURE: 78 MMHG | WEIGHT: 113 LBS | BODY MASS INDEX: 21.36 KG/M2 | SYSTOLIC BLOOD PRESSURE: 134 MMHG

## 2018-06-15 DIAGNOSIS — I10 ESSENTIAL HYPERTENSION WITH GOAL BLOOD PRESSURE LESS THAN 140/90: ICD-10-CM

## 2018-06-15 DIAGNOSIS — G83.84 TODD'S PARALYSIS (HCC): ICD-10-CM

## 2018-06-15 DIAGNOSIS — E11.21 TYPE 2 DIABETES WITH NEPHROPATHY (HCC): Primary | ICD-10-CM

## 2018-06-15 DIAGNOSIS — Z86.73 HISTORY OF CVA (CEREBROVASCULAR ACCIDENT): ICD-10-CM

## 2018-06-15 DIAGNOSIS — E11.21 TYPE 2 DIABETES WITH NEPHROPATHY (HCC): ICD-10-CM

## 2018-06-15 LAB
ANION GAP SERPL CALC-SCNC: 10 MMOL/L (ref 5–15)
BUN SERPL-MCNC: 29 MG/DL (ref 6–20)
BUN/CREAT SERPL: 20 (ref 12–20)
CALCIUM SERPL-MCNC: 9.4 MG/DL (ref 8.5–10.1)
CHLORIDE SERPL-SCNC: 103 MMOL/L (ref 97–108)
CO2 SERPL-SCNC: 26 MMOL/L (ref 21–32)
CREAT SERPL-MCNC: 1.46 MG/DL (ref 0.55–1.02)
GLUCOSE POC: NORMAL MG/DL
GLUCOSE SERPL-MCNC: 105 MG/DL (ref 65–100)
POTASSIUM SERPL-SCNC: 4.6 MMOL/L (ref 3.5–5.1)
SODIUM SERPL-SCNC: 139 MMOL/L (ref 136–145)

## 2018-06-15 PROCEDURE — 80048 BASIC METABOLIC PNL TOTAL CA: CPT | Performed by: FAMILY MEDICINE

## 2018-06-15 PROCEDURE — 83036 HEMOGLOBIN GLYCOSYLATED A1C: CPT | Performed by: FAMILY MEDICINE

## 2018-06-15 RX ORDER — PEN NEEDLE, DIABETIC 30 GX3/16"
NEEDLE, DISPOSABLE MISCELLANEOUS
Qty: 1 PACKAGE | Refills: 1 | Status: ON HOLD | OUTPATIENT
Start: 2018-06-15 | End: 2019-01-28

## 2018-06-15 NOTE — PROGRESS NOTES
Subjective:    [facility] NOTE       Chief Complaint   Patient presents with    Diabetes     follow up         She  is a 58 y.o. 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 \" by Dr. Brian Hoff and is on Keppra. Remote CVA noted on CT on 5/6/18 - \"encephalomalacia in the right parietal/occipital and posterior temporal lobes most consistent with old infarct\"  Which was new since 5/2016. Note review suggest it may have been in 8/2016 when I saw her for a severe HA that had been going on for 4 days. She never got her CT scan after this visit as we discussed and on f/u with Dr. Brandon Pena had HAs resolve though did have some left hand weakness on notes with him in 9/2016. Finished working with PT and OT. Uncontrolled DM 2 - tells me that she may not have been taking the right insulin dose (ex, this am was taking 5 units instead of 54 units). Gave her samples of 70/30 at last appt and started 25 units BID. Glu logs shows range of 80s-270s but largely < 200 for fasting readings.     Lab Results   Component Value Date/Time    Hemoglobin A1c 13.4 (H) 05/07/2018 03:17 AM    Microalbumin/Creat ratio (mg/g creat) 7 06/08/2017 02:16 PM    Microalbumin,urine random 1.03 06/08/2017 02:16 PM    LDL,Direct 119 (H) 01/25/2013 03:10 PM    LDL, calculated 61.6 05/07/2018 03:17 AM    Creatinine (POC) 0.6 08/26/2010 08:05 PM    Creatinine 1.14 (H) 05/11/2018 03:24 PM      Lab Results   Component Value Date/Time    GFR est AA 58 (L) 05/11/2018 03:24 PM    GFR est non-AA 48 (L) 05/11/2018 03:24 PM      Lab Results   Component Value Date/Time    TSH 1.01 08/19/2016 11:06 AM         ROS  Gen - no fever/chills  Resp - no dyspnea or cough  CV - no chest pain or OLIVA  Rest per HPI    Past Medical History:   Diagnosis Date    Diabetes ([address]     Hypertension      No past surgical history on file. Current Outpatient Prescriptions on File Prior to Visit   Medication Sig Dispense Refill    insulin detemir U-100 (LEVEMIR FLEXTOUCH) 100 unit/mL (3 mL) inpn 57 Units by SubCUTAneous route Daily (before breakfast). Indications: PAP medication 6 Pen 1    insulin NPH/insulin regular (NOVOLIN 70/30, HUMULIN 70/30) 100 unit/mL (70-30) injection 25 Units by SubCUTAneous route Before breakfast and dinner for 40 days. Given sample to use until able to get Levemir from PAP program 20 mL 0    aspirin delayed-release 81 mg tablet Take 1 Tab by mouth daily. 30 Tab 1    metoprolol tartrate (LOPRESSOR) 25 mg tablet Take 0.5 Tabs by mouth two (2) times a day. 30 Tab 1    levETIRAcetam (KEPPRA) 250 mg tablet Take 1 PO QAM and 2 Tabs qPM 90 Tab 3    metFORMIN ER (GLUCOPHAGE XR) 500 mg tablet Take 1 in the morning and 2 at night for 1 week and then 2 in the morning and 2 at night. 120 Tab 5    omeprazole (PRILOSEC) 20 mg capsule Take 1 Cap by mouth daily as needed. For stomach pain. 30 Cap 3    lisinopril-hydroCHLOROthiazide (PRINZIDE, ZESTORETIC) 20-25 mg per tablet Take 2 Tabs by mouth daily. 60 Tab 5    zolpidem (AMBIEN) 5 mg tablet TAKE ONE TABLET BY MOUTH NIGHTLY AS NEEDED FOR SLEEP**MAX DAILY AMOUNT: 5 MG** 30 Tab 0     No current facility-administered medications on file prior to visit.          Objective:     Vitals:    06/15/18 1355   BP: 134/78   Pulse: 91   Temp: 98.2 °F (36.8 °C)   TempSrc: Oral   Weight: 113 lb (51.3 kg)     Physical Examination:  General appearance - alert, well appearing, and in no distress  Eyes -sclera anicteric  Neck - supple, no significant adenopathy, no thyromegaly, no bruits  Chest - clear to auscultation, no wheezes, rales or rhonchi, symmetric air entry  Heart - normal rate, regular rhythm, normal S1, S2, no murmurs, rubs, clicks or gallops  Neurological - alert, oriented, no focal findings or movement disorder noted, 5-/5 strength in LUE and LLE, abnormal gait and balance  Extr - no edema    Assessment/ Plan:   Diagnoses and all orders for this visit:    1. Type 2 diabetes with nephropathy (HCC) - glu much improved with using more appropriate amount of insulin. Awaiting Levemir through pap program and using 70/30 samples until then. Sent over rx for needles to help in meantime.  -     AMB POC GLUCOSE BLOOD, BY GLUCOSE MONITORING DEVICE  -     METABOLIC PANEL, BASIC; Future  -     HEMOGLOBIN A1C WITH EAG; Future  -     Insulin Needles, Disposable, 31 gauge x 5/16\" ndle; Dispense insulin twice daily    2. Essential hypertension with goal blood pressure less than 140/90 - well controlled    3. 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 History of CVA (cerebrovascular accident) - nearly back to [facility] with mild deficits      I have discussed the diagnosis with the patient and the intended plan as seen in the above orders. The patient has received an after-visit summary and questions were answered concerning future plans. I have discussed medication side effects and warnings with the patient as well. The patient verbalizes understanding and agreement with the plan. Follow-up Disposition:  Return in about 2 months (around 8/15/2018) for Regular Follow up.

## 2018-06-15 NOTE — PROGRESS NOTES
Coordination of Care  1. Have you been to the ER, urgent care clinic since your last visit? Hospitalized since your last visit? No    2. Have you seen or consulted any other health care providers outside of the [address] [place] [address] since your last visit? Include any pap smears or colon screening. No    Does the patient need refills?  N/A    Learning Assessment Complete? yes    Results for orders placed or performed in visit on 06/15/18   AMB POC GLUCOSE BLOOD, BY GLUCOSE MONITORING DEVICE   Result Value Ref Range    Glucose  non fasting mg/dL

## 2018-06-15 NOTE — MR AVS SNAPSHOT
[address] [id number] [phone] Patient: Ramirez Adame MRN: [id number] HJY:8[birth year]7 Visit Information Date & Time Provider Department Dept. Phone Encounter #  
 6/15/2018  1:55 PM Sarah Weaver, [address] [place] [address] [phone] [id number] Follow-up Instructions Return in about 2 months (around 8/15/2018) for Regular Follow up. Your Appointments 7/26/2018 11:40 AM  
Follow Up with Jada Soriano MD  
[facility] at [address]) Appt Note: f/u from hospital 5/9/2018 [address] [address]  
[phone]  
  
   
 [address] [place] [address] Upcoming Health Maintenance Date Due DTaP/Tdap/Td series (1 - Tdap) 6/5/1975 PAP AKA CERVICAL CYTOLOGY 6/5/1975 FOBT Q 1 YEAR AGE 50-75 6/5/2004 ZOSTER VACCINE AGE 60> 4/5/2014 FOOT EXAM Q1 6/8/2018 MICROALBUMIN Q1 6/8/2018 EYE EXAM RETINAL OR DILATED Q1 7/27/2018 Influenza Age 5 to Adult 8/1/2018 HEMOGLOBIN A1C Q6M 11/7/2018 LIPID PANEL Q1 5/7/2019 BREAST CANCER SCRN MAMMOGRAM 7/6/2019 Allergies as of 6/15/2018  Review Complete On: 6/15/2018 By: Sarah Weaver MD  
 No Known Allergies Current Immunizations  Reviewed on 6/8/2017 Name Date Influenza Vaccine 1/28/2014 11:20 AM, 10/2/2012, 12/9/2011, 11/18/2010 Influenza Vaccine (Quad) PF 11/23/2015 Influenza Vaccine PF 1/21/2015 Pneumococcal Polysaccharide (PPSV-23) 6/8/2017 Not reviewed this visit You Were Diagnosed With   
  
 Codes Comments Type 2 diabetes with nephropathy (HCC)    -  Primary ICD-10-CM: E11.21 
ICD-9-CM: 250.40, 583.81 Essential hypertension with goal blood pressure less than 140/90     ICD-10-CM: I10 
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 
ICD-9-CM: V12.54 Vitals BP Pulse Temp Weight(growth percentile) BMI OB Status 134/78 (BP 1 Location: Left arm, BP Patient Position: Sitting) 91 98.2 °F (36.8 °C) (Oral) 113 lb (51.3 kg) 21.36 kg/m2 Postmenopausal  
 Smoking Status Never Smoker Vitals History BMI and BSA Data Body Mass Index Body Surface Area  
 21.36 kg/m 2 1.49 m 2 Preferred Pharmacy Pharmacy Name Phone [address] [place] [address] Rd. [phone] Your Updated Medication List  
  
   
This list is accurate as of 6/15/18  3:07 PM.  Always use your most recent med list.  
  
  
  
  
 aspirin delayed-release 81 mg tablet Take 1 Tab by mouth daily. insulin detemir U-100 100 unit/mL (3 mL) Inpn Commonly known as:  LEVEMIR FLEXTOUCH  
57 Units by SubCUTAneous route Daily (before breakfast). Indications: PAP medication Insulin Needles (Disposable) 31 gauge x 5/16\" Ndle Dispense insulin twice daily  
  
 insulin NPH/insulin regular 100 unit/mL (70-30) injection Commonly known as:  NOVOLIN 70/30, HUMULIN 70/30  
25 Units by SubCUTAneous route Before breakfast and dinner for 40 days. Given sample to use until able to get Levemir from PAP program  
  
 levETIRAcetam 250 mg tablet Commonly known as:  KEPPRA Take 1 PO QAM and 2 Tabs qPM  
  
 lisinopril-hydroCHLOROthiazide 20-25 mg per tablet Commonly known as:  Buddy Derrek Take 2 Tabs by mouth daily. metFORMIN  mg tablet Commonly known as:  GLUCOPHAGE XR Take 1 in the morning and 2 at night for 1 week and then 2 in the morning and 2 at night. metoprolol tartrate 25 mg tablet Commonly known as:  LOPRESSOR Take 0.5 Tabs by mouth two (2) times a day. omeprazole 20 mg capsule Commonly known as:  PRILOSEC Take 1 Cap by mouth daily as needed. For stomach pain. zolpidem 5 mg tablet Commonly known as:  AMBIEN  
TAKE ONE TABLET BY MOUTH NIGHTLY AS NEEDED FOR SLEEP**MAX DAILY AMOUNT: 5 MG**  
  
  
  
  
Prescriptions Sent to Pharmacy Refills Insulin Needles, Disposable, 31 gauge x 5/16\" ndle 1 Sig: Dispense insulin twice daily Class: Normal  
 Pharmacy: [facility] [address], [address] [facility] #: [phone] We Performed the Following AMB POC GLUCOSE BLOOD, BY GLUCOSE MONITORING DEVICE [28297 CPT(R)] Follow-up Instructions Return in about 2 months (around 8/15/2018) for Regular Follow up. To-Do List   
 06/15/2018 Lab:  HEMOGLOBIN A1C WITH EAG   
  
 06/15/2018 Lab:  METABOLIC PANEL, BASIC Introducing [facility] & HEALTH SERVICES! [facility] introduces [employer] patient portal. Now you can access parts of your medical record, email your doctor's office, and request medication refills online. 1. In your internet browser, go to [email or web address] 2. Click on the First Time User? Click Here link in the Sign In box. You will see the New Member Sign Up page. 3. Enter your [employer] Access Code exactly as it appears below. You will not need to use this code after youve completed the sign-up process. If you do not sign up before the expiration date, you must request a new code. · [employer] Access Code: [email or web address] Expires: 8/4/2018  7:27 PM 
 
4. Enter the last four digits of your Social Security Number (xxxx) and Date of Birth (mm/dd/yyyy) as indicated and click Submit. You will be taken to the next sign-up page. 5. Create a Mailjet ID. This will be your Mailjet login ID and cannot be changed, so think of one that is secure and easy to remember. 6. Create a Mailjet password. You can change your password at any time. 7. Enter your Password Reset Question and Answer. This can be used at a later time if you forget your password. 8. Enter your e-mail address. You will receive e-mail notification when new information is available in [address] 9. Click Sign Up. You can now view and download portions of your medical record. 10. Click the Download Summary menu link to download a portable copy of your medical information. If you have questions, please visit the Frequently Asked Questions section of the [employer] website. Remember, [employer] is NOT to be used for urgent needs. For medical emergencies, dial 911. Now available from your iPhone and Android! Please provide this summary of care documentation to your next provider. Your primary care clinician is listed as Heladio Vides. If you have any questions after today's visit, please call [phone].

## 2018-06-16 LAB
EST. AVERAGE GLUCOSE BLD GHB EST-MCNC: 278 MG/DL
HBA1C MFR BLD: 11.3 % (ref 4.2–6.3)

## 2018-06-16 NOTE — PROGRESS NOTES
pls call patient - diabetes control is improving with insulin. Ct to use insulin and encourage plenty of water since kidney function was down slightly. Continue keeping glucose logs misha once we switch to Levemir through PAP. Thanks!

## 2018-06-25 NOTE — PROGRESS NOTES
Message sent to Dr Terry Nichols as pt is over income for PAP program as her son claims her on his taxes

## 2018-06-29 DIAGNOSIS — E11.21 TYPE 2 DIABETES WITH NEPHROPATHY (HCC): Primary | ICD-10-CM

## 2018-06-29 NOTE — PROGRESS NOTES
Since we are unable to get Levemir through PAP, would like her to start on 70/30 insulin at [facility] - can do 25 units with breakfast and with dinner.   Thanks

## 2018-07-02 NOTE — PROGRESS NOTES
No answer, sent letter with Dr Shannen Reilly message and insulin doses.   To call office with concerns/questions

## 2018-08-17 ENCOUNTER — HOSPITAL ENCOUNTER (OUTPATIENT)
Dept: LAB | Age: 63
Discharge: HOME | End: 2018-08-17

## 2018-08-17 ENCOUNTER — OFFICE VISIT (OUTPATIENT)
Dept: FAMILY MEDICINE CLINIC | Age: 63
End: 2018-08-17

## 2018-08-17 VITALS
TEMPERATURE: 97.7 F | DIASTOLIC BLOOD PRESSURE: 69 MMHG | SYSTOLIC BLOOD PRESSURE: 118 MMHG | BODY MASS INDEX: 21.32 KG/M2 | HEART RATE: 87 BPM | WEIGHT: 112.8 LBS

## 2018-08-17 DIAGNOSIS — R10.13 DYSPEPSIA: ICD-10-CM

## 2018-08-17 DIAGNOSIS — K59.01 SLOW TRANSIT CONSTIPATION: ICD-10-CM

## 2018-08-17 DIAGNOSIS — Z86.73 HISTORY OF CVA (CEREBROVASCULAR ACCIDENT): ICD-10-CM

## 2018-08-17 DIAGNOSIS — I10 ESSENTIAL HYPERTENSION WITH GOAL BLOOD PRESSURE LESS THAN 140/90: ICD-10-CM

## 2018-08-17 DIAGNOSIS — E11.21 TYPE 2 DIABETES WITH NEPHROPATHY (HCC): Primary | ICD-10-CM

## 2018-08-17 DIAGNOSIS — N18.3 ACUTE RENAL FAILURE SUPERIMPOSED ON STAGE 3 CHRONIC KIDNEY DISEASE, UNSPECIFIED ACUTE RENAL FAILURE TYPE: ICD-10-CM

## 2018-08-17 DIAGNOSIS — G83.84 TODD'S PARALYSIS (HCC): ICD-10-CM

## 2018-08-17 DIAGNOSIS — N17.9 ACUTE RENAL FAILURE SUPERIMPOSED ON STAGE 3 CHRONIC KIDNEY DISEASE, UNSPECIFIED ACUTE RENAL FAILURE TYPE: ICD-10-CM

## 2018-08-17 DIAGNOSIS — E11.21 TYPE 2 DIABETES WITH NEPHROPATHY (HCC): ICD-10-CM

## 2018-08-17 LAB
ANION GAP SERPL CALC-SCNC: 10 MMOL/L (ref 5–15)
BUN SERPL-MCNC: 20 MG/DL (ref 6–20)
BUN/CREAT SERPL: 17 (ref 12–20)
CALCIUM SERPL-MCNC: 9.2 MG/DL (ref 8.5–10.1)
CHLORIDE SERPL-SCNC: 101 MMOL/L (ref 97–108)
CO2 SERPL-SCNC: 28 MMOL/L (ref 21–32)
CREAT SERPL-MCNC: 1.16 MG/DL (ref 0.55–1.02)
EST. AVERAGE GLUCOSE BLD GHB EST-MCNC: 220 MG/DL
GLUCOSE POC: NORMAL MG/DL
GLUCOSE SERPL-MCNC: 85 MG/DL (ref 65–100)
HBA1C MFR BLD: 9.3 % (ref 4.2–6.3)
POTASSIUM SERPL-SCNC: 4.8 MMOL/L (ref 3.5–5.1)
SODIUM SERPL-SCNC: 139 MMOL/L (ref 136–145)

## 2018-08-17 PROCEDURE — 83036 HEMOGLOBIN GLYCOSYLATED A1C: CPT | Performed by: FAMILY MEDICINE

## 2018-08-17 PROCEDURE — 80048 BASIC METABOLIC PNL TOTAL CA: CPT | Performed by: FAMILY MEDICINE

## 2018-08-17 RX ORDER — OMEPRAZOLE 20 MG/1
20 CAPSULE, DELAYED RELEASE ORAL
Qty: 30 CAP | Refills: 5 | Status: SHIPPED | OUTPATIENT
Start: 2018-08-17 | End: 2019-02-25 | Stop reason: ALTCHOICE

## 2018-08-17 RX ORDER — METOPROLOL TARTRATE 25 MG/1
12.5 TABLET, FILM COATED ORAL 2 TIMES DAILY
Qty: 30 TAB | Refills: 5 | Status: ON HOLD | OUTPATIENT
Start: 2018-08-17 | End: 2019-02-01 | Stop reason: SDUPTHER

## 2018-08-17 RX ORDER — LISINOPRIL AND HYDROCHLOROTHIAZIDE 20; 25 MG/1; MG/1
2 TABLET ORAL DAILY
Qty: 60 TAB | Refills: 5 | Status: SHIPPED | OUTPATIENT
Start: 2018-08-17 | End: 2019-10-28 | Stop reason: SDUPTHER

## 2018-08-17 RX ORDER — POLYETHYLENE GLYCOL 3350 17 G/17G
17 POWDER, FOR SOLUTION ORAL DAILY
Qty: 225 G | Refills: 0 | Status: ON HOLD | OUTPATIENT
Start: 2018-08-17 | End: 2019-01-28

## 2018-08-17 NOTE — PROGRESS NOTES
Subjective:    [facility] NOTE       Chief Complaint   Patient presents with    Diabetes        She  is a 58 y.o. 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 \" by Dr. Magy Hunter and is on Keppra. Remote CVA noted on CT on 5/6/18 - \"encephalomalacia in the right parietal/occipital and posterior temporal lobes most consistent with old infarct\"  Which was new since 5/2016. Note review suggest it may have been in 8/2016 when I saw her for a severe HA that had been going on for 4 days. She never got her CT scan after this visit as we discussed and on f/u with Dr. Ismael Baltazart had HAs resolve though did have some left hand weakness on notes with him in 9/2016. Finished working with PT and OT. Uncontrolled DM 2 - found out that she was not taking the right insulin dose (ex, this am was taking 5 units instead of 54 units) until last appt. Gave her samples of 70/30 at last appt and started 25 units BID and now doing well. Glu logs shows range of 80s-270s but largely < 200 for fasting readings.     Lab Results   Component Value Date/Time    Hemoglobin A1c 11.3 (H) 06/15/2018 03:15 PM    Microalbumin/Creat ratio (mg/g creat) 7 06/08/2017 02:16 PM    Microalbumin,urine random 1.03 06/08/2017 02:16 PM    LDL,Direct 119 (H) 01/25/2013 03:10 PM    LDL, calculated 61.6 05/07/2018 03:17 AM    Creatinine (POC) 0.6 08/26/2010 08:05 PM    Creatinine 1.46 (H) 06/15/2018 03:15 PM      Lab Results   Component Value Date/Time    GFR est AA 44 (L) 06/15/2018 03:15 PM    GFR est non-AA 36 (L) 06/15/2018 03:15 PM      Lab Results   Component Value Date/Time    TSH 1.01 08/19/2016 11:06 AM       ROS  Gen - no fever/chills  Resp - no dyspnea or cough  CV - no chest pain or OLIVA  Rest per HPI    Past Medical History:   Diagnosis Date    Diabetes ([address]     Hypertension No past surgical history on file. Current Outpatient Prescriptions on File Prior to Visit   Medication Sig Dispense Refill    insulin NPH/insulin regular (NOVOLIN 70/30, HUMULIN 70/30) 100 unit/mL (70-30) injection Take 25 units with breakfast and 25 units with dinner 10 mL 2    Insulin Needles, Disposable, 31 gauge x 5/16\" ndle Dispense insulin twice daily 1 Package 1    aspirin delayed-release 81 mg tablet Take 1 Tab by mouth daily. 30 Tab 1    metoprolol tartrate (LOPRESSOR) 25 mg tablet Take 0.5 Tabs by mouth two (2) times a day. 30 Tab 1    levETIRAcetam (KEPPRA) 250 mg tablet Take 1 PO QAM and 2 Tabs qPM 90 Tab 3    metFORMIN ER (GLUCOPHAGE XR) 500 mg tablet Take 1 in the morning and 2 at night for 1 week and then 2 in the morning and 2 at night. 120 Tab 5    omeprazole (PRILOSEC) 20 mg capsule Take 1 Cap by mouth daily as needed. For stomach pain. 30 Cap 3    lisinopril-hydroCHLOROthiazide (PRINZIDE, ZESTORETIC) 20-25 mg per tablet Take 2 Tabs by mouth daily. 60 Tab 5    zolpidem (AMBIEN) 5 mg tablet TAKE ONE TABLET BY MOUTH NIGHTLY AS NEEDED FOR SLEEP**MAX DAILY AMOUNT: 5 MG** 30 Tab 0     No current facility-administered medications on file prior to visit. Objective:     Vitals:    08/17/18 1011   BP: 118/69   Pulse: 87   Temp: 97.7 °F (36.5 °C)   TempSrc: Oral   Weight: 112 lb 12.8 oz (51.2 kg)     Physical Examination:  General appearance - alert, well appearing, and in no distress  Eyes -sclera anicteric  Neck - supple, no significant adenopathy, no thyromegaly, no bruits  Chest - clear to auscultation, no wheezes, rales or rhonchi, symmetric air entry  Heart - normal rate, regular rhythm, normal S1, S2, no murmurs, rubs, clicks or gallops  Neurological - alert, oriented, no focal findings or movement disorder noted, 5-/5 strength in LUE and LLE, abnormal gait and balance  Extr - no edema    Assessment/ Plan:   Diagnoses and all orders for this visit:    1.  Type 2 diabetes with nephropathy ([facility] - glu much improved with using more appropriate amount of insulin. Rechecking labs today and RN checked on 70/30 through PAP program to ct 25 units BID. To set up for eye exam at next appt. -     AMB POC GLUCOSE BLOOD, BY GLUCOSE MONITORING DEVICE  -     METABOLIC PANEL, BASIC; Future  -     HEMOGLOBIN A1C WITH EAG; Future    2. Essential hypertension with goal blood pressure less than 140/90 - well controlled  -     METABOLIC PANEL, BASIC; Future    3. 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 History of CVA (cerebrovascular accident) - nearly back to [facility] with mild deficits    5. Acute renal failure superimposed on stage 3 chronic kidney disease, unspecified acute renal failure type ([facility]  -     METABOLIC PANEL, BASIC; Future    6. Dyspepsia - ct Prilosec PRN  -     omeprazole (PRILOSEC) 20 mg capsule; Take 1 Cap by mouth daily as needed. For stomach pain. 7. Slow transit constipation - encouraged H2O, walking, and fiber. Miralax in short term. -     polyethylene glycol (MIRALAX) 17 gram/dose powder; Take 17 g by mouth daily. I have discussed the diagnosis with the patient and the intended plan as seen in the above orders. The patient has received an after-visit summary and questions were answered concerning future plans. I have discussed medication side effects and warnings with the patient as well. The patient verbalizes understanding and agreement with the plan. Follow-up Disposition:  Return in about 3 months (around 11/17/2018), or if symptoms worsen or fail to improve, for Regular Follow up.

## 2018-08-17 NOTE — PROGRESS NOTES
Coordination of Care  1. Have you been to the ER, urgent care clinic since your last visit? Hospitalized since your last visit? No    2. Have you seen or consulted any other health care providers outside of the [facility] since your last visit? Include any pap smears or colon screening. No    Does the patient need refills? YES    Learning Assessment Complete?  yes  Depression Screening complete in the past 12 months? yes     Results for orders placed or performed in visit on 08/17/18   AMB POC GLUCOSE BLOOD, BY GLUCOSE MONITORING DEVICE   Result Value Ref Range    Glucose POC nf 109 mg/dL

## 2018-08-17 NOTE — MR AVS SNAPSHOT
[address] [place] [address] [address] [address] 
[phone] Patient: Randall Dietz MRN: [id number] BOX:9/0/3178 Visit Information Date & Time Provider Department Dept. Phone Encounter #  
 8/17/2018 10:10 AM Yudelka Montague, [address] [facility] [phone] [id number] Follow-up Instructions Return in about 3 months (around 11/17/2018), or if symptoms worsen or fail to improve, for Regular Follow up. Upcoming Health Maintenance Date Due DTaP/Tdap/Td series (1 - Tdap) 6/5/1975 PAP AKA CERVICAL CYTOLOGY 6/5/1975 FOBT Q 1 YEAR AGE 50-75 6/5/2004 ZOSTER VACCINE AGE 60> 4/5/2014 FOOT EXAM Q1 6/8/2018 MICROALBUMIN Q1 6/8/2018 EYE EXAM RETINAL OR DILATED Q1 7/27/2018 Influenza Age 5 to Adult 8/1/2018 HEMOGLOBIN A1C Q6M 12/15/2018 LIPID PANEL Q1 5/7/2019 BREAST CANCER SCRN MAMMOGRAM 7/6/2019 Allergies as of 8/17/2018  Review Complete On: 6/15/2018 By: Yudelka Montague MD  
 No Known Allergies Current Immunizations  Reviewed on 6/8/2017 Name Date Influenza Vaccine 1/28/2014 11:20 AM, 10/2/2012, 12/9/2011, 11/18/2010 Influenza Vaccine (Quad) PF 11/23/2015 Influenza Vaccine PF 1/21/2015 Pneumococcal Polysaccharide (PPSV-23) 6/8/2017 Not reviewed this visit You Were Diagnosed With   
  
 Codes Comments Type 2 diabetes with nephropathy (HCC)    -  Primary ICD-10-CM: E11.21 
ICD-9-CM: 250.40, 583.81 Essential hypertension with goal blood pressure less than 140/90     ICD-10-CM: I10 
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 
ICD-9-CM: V12.54 Acute renal failure superimposed on stage 3 chronic kidney disease, unspecified acute renal failure type ([address]     ICD-10-CM: N17.9, N18.3 ICD-9-CM: 584.9, 585.3 Vitals BP Pulse Temp Weight(growth percentile) BMI OB Status 118/69 (BP 1 Location: Left arm) 87 97.7 °F (36.5 °C) (Oral) 112 lb 12.8 oz (51.2 kg) 21.32 kg/m2 Postmenopausal  
 Smoking Status Never Smoker Vitals History BMI and BSA Data Body Mass Index Body Surface Area  
 21.32 kg/m 2 1.48 m 2 Preferred Pharmacy Pharmacy Name Phone [address] [place] [address], [address] [place] [address] Rd. [phone] Your Updated Medication List  
  
   
This list is accurate as of 8/17/18 10:26 AM.  Always use your most recent med list.  
  
  
  
  
 aspirin delayed-release 81 mg tablet Take 1 Tab by mouth daily. Insulin Needles (Disposable) 31 gauge x 5/16\" Ndle Dispense insulin twice daily  
  
 insulin NPH/insulin regular 100 unit/mL (70-30) injection Commonly known as:  NOVOLIN 70/30, HUMULIN 70/30 Take 25 units with breakfast and 25 units with dinner  
  
 levETIRAcetam 250 mg tablet Commonly known as:  KEPPRA Take 1 PO QAM and 2 Tabs qPM  
  
 lisinopril-hydroCHLOROthiazide 20-25 mg per tablet Commonly known as:  Pb Notice Take 2 Tabs by mouth daily. metFORMIN  mg tablet Commonly known as:  GLUCOPHAGE XR Take 1 in the morning and 2 at night for 1 week and then 2 in the morning and 2 at night. metoprolol tartrate 25 mg tablet Commonly known as:  LOPRESSOR Take 0.5 Tabs by mouth two (2) times a day. omeprazole 20 mg capsule Commonly known as:  PRILOSEC Take 1 Cap by mouth daily as needed. For stomach pain. zolpidem 5 mg tablet Commonly known as:  AMBIEN  
TAKE ONE TABLET BY MOUTH NIGHTLY AS NEEDED FOR SLEEP**MAX DAILY AMOUNT: 5 MG** We Performed the Following AMB POC GLUCOSE BLOOD, BY GLUCOSE MONITORING DEVICE [99991 CPT(R)] Follow-up Instructions Return in about 3 months (around 11/17/2018), or if symptoms worsen or fail to improve, for Regular Follow up. To-Do List   
 08/17/2018 Lab:  HEMOGLOBIN A1C WITH EAG   
  
 08/17/2018 Lab:  METABOLIC PANEL, BASIC   
  
 08/17/2018 Lab:  MICROALBUMIN, UR, RAND W/ MICROALB/CREAT RATIO Introducing [facility] & HEALTH SERVICES! [employer] introduces [employer] patient portal. Now you can access parts of your medical record, email your doctor's office, and request medication refills online. 1. In your internet browser, go to [email or web address] 2. Click on the First Time User? Click Here link in the Sign In box. You will see the New Member Sign Up page. 3. Enter your [employer] Access Code exactly as it appears below. You will not need to use this code after youve completed the sign-up process. If you do not sign up before the expiration date, you must request a new code. · [employer] Access Code: [email or web address] Expires: 11/15/2018 10:26 AM 
 
4. Enter the last four digits of your Social Security Number (xxxx) and Date of Birth (mm/dd/yyyy) as indicated and click Submit. You will be taken to the next sign-up page. 5. Create a [employer] ID. This will be your [employer] login ID and cannot be changed, so think of one that is secure and easy to remember. 6. Create a [employer] password. You can change your password at any time. 7. Enter your Password Reset Question and Answer. This can be used at a later time if you forget your password. 8. Enter your e-mail address. You will receive e-mail notification when new information is available in [address] 9. Click Sign Up. You can now view and download portions of your medical record. 10. Click the Download Summary menu link to download a portable copy of your medical information. If you have questions, please visit the Frequently Asked Questions section of the [employer] website. Remember, [employer] is NOT to be used for urgent needs. For medical emergencies, dial 911. Now available from your iPhone and Android! Please provide this summary of care documentation to your next provider. Your primary care clinician is listed as Alberta Nunez. If you have any questions after today's visit, please call [phone].

## 2018-08-26 NOTE — PROGRESS NOTES
Pls call - diabetes control sig improved but need to continue working on this with medication and working on diet.   Thanks

## 2018-11-15 ENCOUNTER — TELEPHONE (OUTPATIENT)
Dept: FAMILY MEDICINE CLINIC | Age: 63
End: 2018-11-15

## 2018-11-15 NOTE — TELEPHONE ENCOUNTER
Left vm for patient that we need to reschedule her 11/16 nutrition appointment which was scheduled with Олег Mcmanus as [address] clinic will be closed to please call the clinic and leave a message and we will get back to her to reschedule.

## 2018-12-07 DIAGNOSIS — Z79.4 TYPE 2 DIABETES MELLITUS WITHOUT COMPLICATION, WITH LONG-TERM CURRENT USE OF INSULIN (HCC): ICD-10-CM

## 2018-12-07 DIAGNOSIS — E11.9 TYPE 2 DIABETES MELLITUS WITHOUT COMPLICATION, WITH LONG-TERM CURRENT USE OF INSULIN (HCC): ICD-10-CM

## 2018-12-10 RX ORDER — METFORMIN HYDROCHLORIDE 500 MG/1
TABLET, EXTENDED RELEASE ORAL
Qty: 120 TAB | Refills: 5 | Status: SHIPPED | OUTPATIENT
Start: 2018-12-10 | End: 2019-10-28

## 2019-01-27 ENCOUNTER — APPOINTMENT (OUTPATIENT)
Dept: GENERAL RADIOLOGY | Age: 64
DRG: 065 | End: 2019-01-27
Attending: EMERGENCY MEDICINE
Payer: SUBSIDIZED

## 2019-01-27 ENCOUNTER — HOSPITAL ENCOUNTER (INPATIENT)
Age: 64
LOS: 5 days | Discharge: NURSING, REHAB OR LONG-TERM CARE | DRG: 065 | End: 2019-02-01
Attending: EMERGENCY MEDICINE | Admitting: FAMILY MEDICINE
Payer: SUBSIDIZED

## 2019-01-27 ENCOUNTER — APPOINTMENT (OUTPATIENT)
Dept: CT IMAGING | Age: 64
DRG: 065 | End: 2019-01-27
Attending: EMERGENCY MEDICINE
Payer: SUBSIDIZED

## 2019-01-27 DIAGNOSIS — I10 ESSENTIAL HYPERTENSION WITH GOAL BLOOD PRESSURE LESS THAN 140/90: ICD-10-CM

## 2019-01-27 DIAGNOSIS — R47.01 APHASIA: ICD-10-CM

## 2019-01-27 DIAGNOSIS — I63.511 ACUTE ISCHEMIC RIGHT MCA STROKE (HCC): ICD-10-CM

## 2019-01-27 DIAGNOSIS — I67.82 CHRONIC CEREBRAL ISCHEMIA: ICD-10-CM

## 2019-01-27 PROBLEM — R41.82 ALTERED MENTAL STATUS: Status: ACTIVE | Noted: 2019-01-27

## 2019-01-27 PROBLEM — R26.2 DIFFICULTY WALKING: Status: ACTIVE | Noted: 2019-01-27

## 2019-01-27 PROBLEM — R47.81 SLURRED SPEECH: Status: ACTIVE | Noted: 2019-01-27

## 2019-01-27 LAB
ALBUMIN SERPL-MCNC: 3.8 G/DL (ref 3.5–5)
ALBUMIN/GLOB SERPL: 0.8 {RATIO} (ref 1.1–2.2)
ALP SERPL-CCNC: 86 U/L (ref 45–117)
ALT SERPL-CCNC: 27 U/L (ref 12–78)
ANION GAP SERPL CALC-SCNC: 11 MMOL/L (ref 5–15)
APPEARANCE UR: ABNORMAL
AST SERPL-CCNC: 25 U/L (ref 15–37)
BACTERIA URNS QL MICRO: ABNORMAL /HPF
BASOPHILS # BLD: 0 K/UL (ref 0–0.1)
BASOPHILS NFR BLD: 0 % (ref 0–1)
BILIRUB SERPL-MCNC: 0.3 MG/DL (ref 0.2–1)
BILIRUB UR QL: NEGATIVE
BNP SERPL-MCNC: 29 PG/ML (ref 0–125)
BUN SERPL-MCNC: 14 MG/DL (ref 6–20)
BUN/CREAT SERPL: 12 (ref 12–20)
CALCIUM SERPL-MCNC: 8.7 MG/DL (ref 8.5–10.1)
CHLORIDE SERPL-SCNC: 97 MMOL/L (ref 97–108)
CO2 SERPL-SCNC: 27 MMOL/L (ref 21–32)
COLOR UR: ABNORMAL
COMMENT, HOLDF: NORMAL
CREAT SERPL-MCNC: 1.17 MG/DL (ref 0.55–1.02)
DIFFERENTIAL METHOD BLD: ABNORMAL
EOSINOPHIL # BLD: 0 K/UL (ref 0–0.4)
EOSINOPHIL NFR BLD: 0 % (ref 0–7)
EPITH CASTS URNS QL MICRO: ABNORMAL /LPF
ERYTHROCYTE [DISTWIDTH] IN BLOOD BY AUTOMATED COUNT: 11.9 % (ref 11.5–14.5)
GLOBULIN SER CALC-MCNC: 4.5 G/DL (ref 2–4)
GLUCOSE SERPL-MCNC: 174 MG/DL (ref 65–100)
GLUCOSE UR STRIP.AUTO-MCNC: NEGATIVE MG/DL
HCT VFR BLD AUTO: 44.6 % (ref 35–47)
HGB BLD-MCNC: 15 G/DL (ref 11.5–16)
HGB UR QL STRIP: NEGATIVE
HYALINE CASTS URNS QL MICRO: ABNORMAL /LPF (ref 0–5)
IMM GRANULOCYTES # BLD AUTO: 0.1 K/UL (ref 0–0.04)
IMM GRANULOCYTES NFR BLD AUTO: 1 % (ref 0–0.5)
KETONES UR QL STRIP.AUTO: NEGATIVE MG/DL
LEUKOCYTE ESTERASE UR QL STRIP.AUTO: ABNORMAL
LYMPHOCYTES # BLD: 1.9 K/UL (ref 0.8–3.5)
LYMPHOCYTES NFR BLD: 21 % (ref 12–49)
MCH RBC QN AUTO: 29 PG (ref 26–34)
MCHC RBC AUTO-ENTMCNC: 33.6 G/DL (ref 30–36.5)
MCV RBC AUTO: 86.1 FL (ref 80–99)
MONOCYTES # BLD: 0.6 K/UL (ref 0–1)
MONOCYTES NFR BLD: 7 % (ref 5–13)
NEUTS SEG # BLD: 6.5 K/UL (ref 1.8–8)
NEUTS SEG NFR BLD: 71 % (ref 32–75)
NITRITE UR QL STRIP.AUTO: NEGATIVE
NRBC # BLD: 0 K/UL (ref 0–0.01)
NRBC BLD-RTO: 0 PER 100 WBC
PH UR STRIP: 7 [PH] (ref 5–8)
PLATELET # BLD AUTO: 618 K/UL (ref 150–400)
PLATELET COMMENTS,PCOM: ABNORMAL
PMV BLD AUTO: 10.5 FL (ref 8.9–12.9)
POTASSIUM SERPL-SCNC: 3.5 MMOL/L (ref 3.5–5.1)
PROT SERPL-MCNC: 8.3 G/DL (ref 6.4–8.2)
PROT UR STRIP-MCNC: NEGATIVE MG/DL
RBC # BLD AUTO: 5.18 M/UL (ref 3.8–5.2)
RBC #/AREA URNS HPF: ABNORMAL /HPF (ref 0–5)
RBC MORPH BLD: ABNORMAL
SAMPLES BEING HELD,HOLD: NORMAL
SODIUM SERPL-SCNC: 135 MMOL/L (ref 136–145)
SP GR UR REFRACTOMETRY: 1.02 (ref 1–1.03)
TROPONIN I SERPL-MCNC: <0.05 NG/ML
TSH SERPL DL<=0.05 MIU/L-ACNC: 1.68 UIU/ML (ref 0.36–3.74)
UA: UC IF INDICATED,UAUC: ABNORMAL
UROBILINOGEN UR QL STRIP.AUTO: 0.2 EU/DL (ref 0.2–1)
WBC # BLD AUTO: 9.1 K/UL (ref 3.6–11)
WBC MORPH BLD: ABNORMAL
WBC URNS QL MICRO: ABNORMAL /HPF (ref 0–4)

## 2019-01-27 PROCEDURE — 72170 X-RAY EXAM OF PELVIS: CPT

## 2019-01-27 PROCEDURE — 93005 ELECTROCARDIOGRAM TRACING: CPT

## 2019-01-27 PROCEDURE — 83880 ASSAY OF NATRIURETIC PEPTIDE: CPT

## 2019-01-27 PROCEDURE — 84484 ASSAY OF TROPONIN QUANT: CPT

## 2019-01-27 PROCEDURE — 85025 COMPLETE CBC W/AUTO DIFF WBC: CPT

## 2019-01-27 PROCEDURE — 87086 URINE CULTURE/COLONY COUNT: CPT

## 2019-01-27 PROCEDURE — 70450 CT HEAD/BRAIN W/O DYE: CPT

## 2019-01-27 PROCEDURE — 80053 COMPREHEN METABOLIC PANEL: CPT

## 2019-01-27 PROCEDURE — 36415 COLL VENOUS BLD VENIPUNCTURE: CPT

## 2019-01-27 PROCEDURE — 65660000000 HC RM CCU STEPDOWN

## 2019-01-27 PROCEDURE — 84443 ASSAY THYROID STIM HORMONE: CPT

## 2019-01-27 PROCEDURE — 99285 EMERGENCY DEPT VISIT HI MDM: CPT

## 2019-01-27 PROCEDURE — 81001 URINALYSIS AUTO W/SCOPE: CPT

## 2019-01-27 PROCEDURE — 73060 X-RAY EXAM OF HUMERUS: CPT

## 2019-01-27 RX ORDER — SODIUM CHLORIDE 0.9 % (FLUSH) 0.9 %
5-40 SYRINGE (ML) INJECTION EVERY 8 HOURS
Status: DISCONTINUED | OUTPATIENT
Start: 2019-01-27 | End: 2019-02-01 | Stop reason: HOSPADM

## 2019-01-27 RX ORDER — SODIUM CHLORIDE 9 MG/ML
125 INJECTION, SOLUTION INTRAVENOUS CONTINUOUS
Status: DISCONTINUED | OUTPATIENT
Start: 2019-01-27 | End: 2019-01-29

## 2019-01-27 RX ORDER — SODIUM CHLORIDE 0.9 % (FLUSH) 0.9 %
5-40 SYRINGE (ML) INJECTION AS NEEDED
Status: DISCONTINUED | OUTPATIENT
Start: 2019-01-27 | End: 2019-02-01 | Stop reason: HOSPADM

## 2019-01-27 NOTE — ED TRIAGE NOTES
Patient arrives with son, son states patient has had generalized weakness since Friday evening, trouble standing. No weakness on one side or other, no facial droop. Patient usually ambulatory and has been weak and having falls. Does c/o RIGHT arm pain since fall yesterday. Denies CP/SOB, denies recent cough/fever. Taking PO well.

## 2019-01-28 ENCOUNTER — APPOINTMENT (OUTPATIENT)
Dept: MRI IMAGING | Age: 64
DRG: 065 | End: 2019-01-28
Attending: FAMILY MEDICINE
Payer: SUBSIDIZED

## 2019-01-28 ENCOUNTER — APPOINTMENT (OUTPATIENT)
Dept: NON INVASIVE DIAGNOSTICS | Age: 64
DRG: 065 | End: 2019-01-28
Attending: FAMILY MEDICINE
Payer: SUBSIDIZED

## 2019-01-28 ENCOUNTER — APPOINTMENT (OUTPATIENT)
Dept: VASCULAR SURGERY | Age: 64
DRG: 065 | End: 2019-01-28
Attending: FAMILY MEDICINE
Payer: SUBSIDIZED

## 2019-01-28 ENCOUNTER — APPOINTMENT (OUTPATIENT)
Dept: MRI IMAGING | Age: 64
DRG: 065 | End: 2019-01-28
Attending: PSYCHIATRY & NEUROLOGY
Payer: SUBSIDIZED

## 2019-01-28 LAB
AMMONIA PLAS-SCNC: 21 UMOL/L
ANION GAP SERPL CALC-SCNC: 8 MMOL/L (ref 5–15)
APPEARANCE UR: CLEAR
ATRIAL RATE: 81 BPM
BACTERIA URNS QL MICRO: NEGATIVE /HPF
BASOPHILS # BLD: 0 K/UL (ref 0–0.1)
BASOPHILS NFR BLD: 0 % (ref 0–1)
BILIRUB UR QL: NEGATIVE
BUN SERPL-MCNC: 12 MG/DL (ref 6–20)
BUN/CREAT SERPL: 11 (ref 12–20)
CALCIUM SERPL-MCNC: 8.2 MG/DL (ref 8.5–10.1)
CALCULATED P AXIS, ECG09: 84 DEGREES
CALCULATED R AXIS, ECG10: 26 DEGREES
CALCULATED T AXIS, ECG11: 46 DEGREES
CHLORIDE SERPL-SCNC: 101 MMOL/L (ref 97–108)
CO2 SERPL-SCNC: 28 MMOL/L (ref 21–32)
COLOR UR: NORMAL
CREAT SERPL-MCNC: 1.1 MG/DL (ref 0.55–1.02)
DIAGNOSIS, 93000: NORMAL
DIFFERENTIAL METHOD BLD: ABNORMAL
EOSINOPHIL # BLD: 0.1 K/UL (ref 0–0.4)
EOSINOPHIL NFR BLD: 1 % (ref 0–7)
EPITH CASTS URNS QL MICRO: NORMAL /LPF
ERYTHROCYTE [DISTWIDTH] IN BLOOD BY AUTOMATED COUNT: 12.1 % (ref 11.5–14.5)
GLUCOSE BLD STRIP.AUTO-MCNC: 126 MG/DL (ref 65–100)
GLUCOSE BLD STRIP.AUTO-MCNC: 184 MG/DL (ref 65–100)
GLUCOSE BLD STRIP.AUTO-MCNC: 188 MG/DL (ref 65–100)
GLUCOSE BLD STRIP.AUTO-MCNC: 213 MG/DL (ref 65–100)
GLUCOSE SERPL-MCNC: 122 MG/DL (ref 65–100)
GLUCOSE UR STRIP.AUTO-MCNC: NEGATIVE MG/DL
HCT VFR BLD AUTO: 40.4 % (ref 35–47)
HGB BLD-MCNC: 13.3 G/DL (ref 11.5–16)
HGB UR QL STRIP: NEGATIVE
HYALINE CASTS URNS QL MICRO: NORMAL /LPF (ref 0–5)
IMM GRANULOCYTES # BLD AUTO: 0 K/UL (ref 0–0.04)
IMM GRANULOCYTES NFR BLD AUTO: 1 % (ref 0–0.5)
KETONES UR QL STRIP.AUTO: NEGATIVE MG/DL
LEUKOCYTE ESTERASE UR QL STRIP.AUTO: NEGATIVE
LYMPHOCYTES # BLD: 2.7 K/UL (ref 0.8–3.5)
LYMPHOCYTES NFR BLD: 36 % (ref 12–49)
MAGNESIUM SERPL-MCNC: 1.7 MG/DL (ref 1.6–2.4)
MCH RBC QN AUTO: 28.9 PG (ref 26–34)
MCHC RBC AUTO-ENTMCNC: 32.9 G/DL (ref 30–36.5)
MCV RBC AUTO: 87.6 FL (ref 80–99)
MONOCYTES # BLD: 0.7 K/UL (ref 0–1)
MONOCYTES NFR BLD: 9 % (ref 5–13)
NEUTS SEG # BLD: 4 K/UL (ref 1.8–8)
NEUTS SEG NFR BLD: 54 % (ref 32–75)
NITRITE UR QL STRIP.AUTO: NEGATIVE
NRBC # BLD: 0 K/UL (ref 0–0.01)
NRBC BLD-RTO: 0 PER 100 WBC
P-R INTERVAL, ECG05: 222 MS
PH UR STRIP: 6 [PH] (ref 5–8)
PHOSPHATE SERPL-MCNC: 3.1 MG/DL (ref 2.6–4.7)
PLATELET # BLD AUTO: 510 K/UL (ref 150–400)
PMV BLD AUTO: 10.5 FL (ref 8.9–12.9)
POTASSIUM SERPL-SCNC: 3.7 MMOL/L (ref 3.5–5.1)
PROT UR STRIP-MCNC: NEGATIVE MG/DL
Q-T INTERVAL, ECG07: 386 MS
QRS DURATION, ECG06: 84 MS
QTC CALCULATION (BEZET), ECG08: 448 MS
RBC # BLD AUTO: 4.61 M/UL (ref 3.8–5.2)
RBC #/AREA URNS HPF: NORMAL /HPF (ref 0–5)
SERVICE CMNT-IMP: ABNORMAL
SODIUM SERPL-SCNC: 137 MMOL/L (ref 136–145)
SP GR UR REFRACTOMETRY: 1.01 (ref 1–1.03)
UA: UC IF INDICATED,UAUC: NORMAL
UROBILINOGEN UR QL STRIP.AUTO: 0.2 EU/DL (ref 0.2–1)
VENTRICULAR RATE, ECG03: 81 BPM
WBC # BLD AUTO: 7.5 K/UL (ref 3.6–11)
WBC URNS QL MICRO: NORMAL /HPF (ref 0–4)

## 2019-01-28 PROCEDURE — 80061 LIPID PANEL: CPT

## 2019-01-28 PROCEDURE — 36415 COLL VENOUS BLD VENIPUNCTURE: CPT

## 2019-01-28 PROCEDURE — 74011250636 HC RX REV CODE- 250/636: Performed by: FAMILY MEDICINE

## 2019-01-28 PROCEDURE — 65660000000 HC RM CCU STEPDOWN

## 2019-01-28 PROCEDURE — 84100 ASSAY OF PHOSPHORUS: CPT

## 2019-01-28 PROCEDURE — 93306 TTE W/DOPPLER COMPLETE: CPT

## 2019-01-28 PROCEDURE — 70544 MR ANGIOGRAPHY HEAD W/O DYE: CPT

## 2019-01-28 PROCEDURE — 70551 MRI BRAIN STEM W/O DYE: CPT

## 2019-01-28 PROCEDURE — 74011000258 HC RX REV CODE- 258: Performed by: EMERGENCY MEDICINE

## 2019-01-28 PROCEDURE — 74011250636 HC RX REV CODE- 250/636: Performed by: EMERGENCY MEDICINE

## 2019-01-28 PROCEDURE — 83735 ASSAY OF MAGNESIUM: CPT

## 2019-01-28 PROCEDURE — 85025 COMPLETE CBC W/AUTO DIFF WBC: CPT

## 2019-01-28 PROCEDURE — 74011636637 HC RX REV CODE- 636/637: Performed by: INTERNAL MEDICINE

## 2019-01-28 PROCEDURE — 74011250637 HC RX REV CODE- 250/637: Performed by: PSYCHIATRY & NEUROLOGY

## 2019-01-28 PROCEDURE — 82140 ASSAY OF AMMONIA: CPT

## 2019-01-28 PROCEDURE — 92610 EVALUATE SWALLOWING FUNCTION: CPT | Performed by: SPEECH-LANGUAGE PATHOLOGIST

## 2019-01-28 PROCEDURE — 83036 HEMOGLOBIN GLYCOSYLATED A1C: CPT

## 2019-01-28 PROCEDURE — 81001 URINALYSIS AUTO W/SCOPE: CPT

## 2019-01-28 PROCEDURE — 80048 BASIC METABOLIC PNL TOTAL CA: CPT

## 2019-01-28 PROCEDURE — 74011000258 HC RX REV CODE- 258: Performed by: FAMILY MEDICINE

## 2019-01-28 PROCEDURE — 82962 GLUCOSE BLOOD TEST: CPT

## 2019-01-28 RX ORDER — MAGNESIUM SULFATE 100 %
4 CRYSTALS MISCELLANEOUS AS NEEDED
Status: DISCONTINUED | OUTPATIENT
Start: 2019-01-28 | End: 2019-02-01 | Stop reason: HOSPADM

## 2019-01-28 RX ORDER — ASPIRIN 300 MG/1
300 SUPPOSITORY RECTAL DAILY
Status: DISCONTINUED | OUTPATIENT
Start: 2019-01-28 | End: 2019-01-28

## 2019-01-28 RX ORDER — DEXTROSE 50 % IN WATER (D50W) INTRAVENOUS SYRINGE
25-50 AS NEEDED
Status: DISCONTINUED | OUTPATIENT
Start: 2019-01-28 | End: 2019-02-01 | Stop reason: HOSPADM

## 2019-01-28 RX ORDER — INSULIN LISPRO 100 [IU]/ML
INJECTION, SOLUTION INTRAVENOUS; SUBCUTANEOUS
Status: DISCONTINUED | OUTPATIENT
Start: 2019-01-28 | End: 2019-02-01 | Stop reason: HOSPADM

## 2019-01-28 RX ORDER — CLOPIDOGREL BISULFATE 75 MG/1
75 TABLET ORAL DAILY
Status: DISCONTINUED | OUTPATIENT
Start: 2019-01-28 | End: 2019-02-01 | Stop reason: HOSPADM

## 2019-01-28 RX ADMIN — Medication 10 ML: at 06:43

## 2019-01-28 RX ADMIN — Medication 10 ML: at 00:28

## 2019-01-28 RX ADMIN — INSULIN LISPRO 2 UNITS: 100 INJECTION, SOLUTION INTRAVENOUS; SUBCUTANEOUS at 17:45

## 2019-01-28 RX ADMIN — Medication 10 ML: at 22:00

## 2019-01-28 RX ADMIN — SODIUM CHLORIDE 250 MG: 900 INJECTION, SOLUTION INTRAVENOUS at 08:44

## 2019-01-28 RX ADMIN — SODIUM CHLORIDE 125 ML/HR: 900 INJECTION, SOLUTION INTRAVENOUS at 00:28

## 2019-01-28 RX ADMIN — CLOPIDOGREL BISULFATE 75 MG: 75 TABLET ORAL at 13:21

## 2019-01-28 RX ADMIN — Medication 10 ML: at 17:45

## 2019-01-28 RX ADMIN — CEFTRIAXONE 1 G: 1 INJECTION, POWDER, FOR SOLUTION INTRAMUSCULAR; INTRAVENOUS at 17:42

## 2019-01-28 RX ADMIN — SODIUM CHLORIDE 500 MG: 900 INJECTION, SOLUTION INTRAVENOUS at 17:43

## 2019-01-28 RX ADMIN — CEFTRIAXONE 1 G: 1 INJECTION, POWDER, FOR SOLUTION INTRAMUSCULAR; INTRAVENOUS at 00:34

## 2019-01-28 NOTE — PROGRESS NOTES
Physical Therapy 1/28/2019 Chart reviewed and orders acknowledged. Cleared by RN. No family in room. Attempted blue phone with no success with pt's language([place]gbo with [address]). Wrote brief sentences on paper using [employer] translate(name of PT, role and hope to walk) and pt rolling over in bed when handed paper. Pt closing eyes and nodding head no. Would benefit from having family in the room to complete evaluation. Aborted evaluation.  
 
Thank Barry Suarez, PT, DPT

## 2019-01-28 NOTE — ED NOTES
2000: Patient in room with son in law. Agreeable to non medical translation through son in law, declining blue  phone at this time. 2100: Hourly rounding completed, Son at bedside. Patient resting. Assisted to bedside commode x1 for urine sample. 2200: Lights dimmed, hourly rounding completed. Number taken for Son in [place] [phone], to call when patient has a bed upstairs. Patient resting in bed. 
 
2259: TRANSFER - OUT REPORT: 
 
Verbal report given to MARTHA Barragan(name) on Rosana Avilez  being transferred to NEURO(unit) for routine progression of care Report consisted of patients Situation, Background, Assessment and  
Recommendations(SBAR). Information from the following report(s) SBAR, ED Summary, Intake/Output, MAR and Recent Results was reviewed with the receiving nurse. Lines:  
Peripheral IV 01/27/19 Left Antecubital (Active) Site Assessment Clean, dry, & intact 1/27/2019  6:56 PM  
Phlebitis Assessment 0 1/27/2019  6:56 PM  
Infiltration Assessment 0 1/27/2019  6:56 PM  
Dressing Status Clean, dry, & intact 1/27/2019  6:56 PM  
Dressing Type Topical skin adhesive;Transparent 1/27/2019  6:56 PM  
Hub Color/Line Status Pink;Capped;Flushed;Patent 1/27/2019  6:56 PM  
Action Taken Blood drawn 1/27/2019  6:56 PM  
  
 
Opportunity for questions and clarification was provided. Patient transported with: 
 Monitor Registered Nurse  
 
 
2300: Patient sleeping  in bed. VSS. Attempted to call son in law for update on patient room number, no answer, left call back number. 18: Spoke with son in law, states patient speaks a ethnic language of nigeria with no translatable name and is unable to be found on  phone per son. States will be coming to hospital soon, spoke to mother in law and explained transfer to floor. Patient transported to inpatient unit with primary RN and Clearance MARTHA Malin. Inpatient tech at bedside to received patient.  MARTHA Barragan notified of patient arrival.

## 2019-01-28 NOTE — PROGRESS NOTES
Reason for Admission: The patient presented with CVA work-up RRAT Score: 22 Resources/supports as identified by patient/family: The patient has familial support- lives with daughter Teto Marroquin Top Challenges facing patient (as identified by patient/family and CM): Finances/Medication cost?  Per family, the patient has a financial assistance/Care Card on file at [facility] [employer] [facility] for medications thorough [facility] Transportation? Family vs. BLS dependent upon functioning Support system or lack thereof? Patient has familial support Living arrangements? Patient lives with her daughter Teto Marroquin Self-care/ADLs/Cognition? Family endorses that she was completing ADLs and mobility, however, family endorses falls at home prior to hospitalizaiton Current Advanced Directive/Advance Care Plan:  Full code, none on file Plan for utilizing home health: The patient was established with [facility] for [facility] home health in May 2018 Likelihood of readmission: High 
              
Transition of Care Plan: TBD The CM reviewed patient chart- the patient has PT evaluations pending, anticipate patient will need OT consultation. The patient speaks specific dialect that is not found on blue phone, information for assessment obtained from the patient's daughter. The CM called the patient's daughter, verified demographics, also verified that the patient is uninsured. The patient lives at home with her daughter, and is typically fairly independent with ADLs and mobility- denied use of DME in the home. The patient is connected to the [facility], and has a care card on file at [employer] through the clinic for prescription assistance.  The patient has a history of receiving [facility] as andrey, and family endorses that they previously completed the Care Card application in May- CM will provide additional copy of Care Card application. The CM will continue to follow and await PT evaluation- daughter does endorse the patient is a resident and has green card. LANNY Leiva Care Management Interventions PCP Verified by CM: Yes(Patient's daughter stated that patient is followed at [id number] [place] [address]) Mode of Transport at Discharge: Other (see comment)(Family vs. BLS dependent upon functioning ) Transition of Care Consult (CM Consult): Discharge Planning MyChart Signup: No 
Discharge Durable Medical Equipment: No 
Health Maintenance Reviewed: Yes Physical Therapy Consult: Yes Occupational Therapy Consult: No(Patient will require OT consultation) Speech Therapy Consult: Yes Current Support Network: Relative's Home, Other(Patient lives with her daughter ) Confirm Follow Up Transport: Family Plan discussed with Pt/Family/Caregiver: Yes [employer] Information Provided?: No 
Discharge Location Discharge Placement: Unable to determine at this time(Patient has PT evaluations pending- will need OT evaluations)

## 2019-01-28 NOTE — PROGRESS NOTES
Problem: Falls - Risk of 
Goal: *Absence of Falls Document [facility] Fall Risk and appropriate interventions in the flowsheet. Outcome: Progressing Towards Goal 
Fall Risk Interventions: 
Mobility Interventions: Bed/chair exit alarm, Communicate number of staff needed for ambulation/transfer, OT consult for ADLs, Patient to call before getting OOB, PT Consult for mobility concerns, PT Consult for assist device competence, Strengthening exercises (ROM-active/passive), Utilize walker, cane, or other assistive device, Utilize gait belt for transfers/ambulation Mentation Interventions: Adequate sleep, hydration, pain control, Bed/chair exit alarm, Door open when patient unattended, Evaluate medications/consider consulting pharmacy, Gait belt with transfers/ambulation, Increase mobility, More frequent rounding, Reorient patient, Toileting rounds, Update white board Elimination Interventions: Bed/chair exit alarm, Call light in reach, Patient to call for help with toileting needs, Toileting schedule/hourly rounds History of Falls Interventions: Bed/chair exit alarm, Consult care management for discharge planning, Door open when patient unattended, Evaluate medications/consider consulting pharmacy, Investigate reason for fall, Utilize gait belt for transfer/ambulation

## 2019-01-28 NOTE — PROGRESS NOTES
Admission Medication Reconciliation: 
 
Information obtained from: patient's daughter, medication bottles Significant PMH/Disease States:  
Past Medical History:  
Diagnosis Date  Diabetes ([facility] [address]  Hypertension  Stroke ([facility] [address] Allergies:  Patient has no known allergies. Prior to Admission Medications:  
Prior to Admission Medications Prescriptions Last Dose Informant Patient Reported? Taking?  
aspirin delayed-release 81 mg tablet 2019  No Yes Sig: Take 1 Tab by mouth daily. insulin NPH/insulin regular (NOVOLIN 70/30, HUMULIN 70/30) 100 unit/mL (70-30) injection 2019  No Yes Si Units by SubCUTAneous route Before breakfast and dinner. Take 25 units with breakfast and 25 units with dinner  
levETIRAcetam (KEPPRA) 250 mg tablet 2019  No Yes Sig: Take 1 PO QAM and 2 Tabs qPM  
lisinopril-hydroCHLOROthiazide (PRINZIDE, ZESTORETIC) 20-25 mg per tablet 2019  No Yes Sig: Take 2 Tabs by mouth daily. metFORMIN ER (GLUCOPHAGE XR) 500 mg tablet 2019  No Yes Sig: TAKE ONE TABLET BY MOUTH IN THE MORNING AND TWO AT NIGHT FOR ONE WEEK AND THEN TWO TABLETS IN THE MORNING AND TWO TABLETS AT NIGHT  
metoprolol tartrate (LOPRESSOR) 25 mg tablet 2019  No Yes Sig: Take 0.5 Tabs by mouth two (2) times a day. omeprazole (PRILOSEC) 20 mg capsule 2019 at Unknown time  No Yes Sig: Take 1 Cap by mouth daily as needed. For stomach pain. zolpidem (AMBIEN) 5 mg tablet Not Taking at Unknown time  No No  
Sig: TAKE ONE TABLET BY MOUTH NIGHTLY AS NEEDED FOR SLEEP**MAX DAILY AMOUNT: 5 MG** Facility-Administered Medications: None Comments/Recommendations: Reviewed home medication with patient's daughter. Removed miralax daily. Of note, patient's daughter said that she \"has zolpidem just in case, but she doesn't use it because she sleeps well. \" 
 
Otoniel Coker, PharmD

## 2019-01-28 NOTE — ED PROVIDER NOTES
58 y.o. female with past medical history significant for DM, HTN, and CVA (1 year ago; was brought in because she was unable to stand) presents accompanied by son-in-law with chief complaint of slurred speech and weakness that started yesterday when the pt woke up. Son-in-law explains that the pt's family noticed that the pt had some difficulty walking and progressively worsening weakness that started yesterday, noting that she fell and injured her right shoulder and right head. Son-in-law also reports that the pt's speech has been slow and slurred since yesterday, but is currently normal. Of note, the pt was noted to be normal prior to yesterday, where she is normally ambulatory without assistance. Pt denies any fevers at this time. There are no other acute medical concerns at this time. Social hx: Patient denies Tobacco use. Denies EtOH use. Denies illicit drug abuse. PCP: Margaretann Lennox, MD 
 
Note written by Tika Meneses, as dictated by Karishma Dominguez MD 7:39 PM 
 
 
 
The history is provided by the patient and a relative. A  was used. Past Medical History:  
Diagnosis Date  Diabetes ([facility] [address]  Hypertension  Stroke ([facility] [address] History reviewed. No pertinent surgical history. History reviewed. No pertinent family history. Social History Socioeconomic History  Marital status:  Spouse name: Not on file  Number of children: Not on file  Years of education: Not on file  Highest education level: Not on file Social Needs  Financial resource strain: Not on file  Food insecurity - worry: Not on file  Food insecurity - inability: Not on file  Transportation needs - medical: Not on file  Transportation needs - non-medical: Not on file Occupational History  Not on file Tobacco Use  Smoking status: Never Smoker  Smokeless tobacco: Never Used Substance and Sexual Activity  Alcohol use:  No  
 Alcohol/week: 0.0 oz  Drug use: No  
 Sexual activity: Not on file Other Topics Concern  Not on file Social History Narrative  Not on file ALLERGIES: Patient has no known allergies. Review of Systems Constitutional: Negative for chills and fever. Respiratory: Negative for shortness of breath. Cardiovascular: Negative for chest pain. Musculoskeletal: Positive for arthralgias (right shoulder pain) and gait problem. Neurological: Positive for speech difficulty, weakness and headaches. All other systems reviewed and are negative. Vitals:  
 01/27/19 1743 01/27/19 1844 BP:  148/81 Pulse: 80 84 Resp:  18 Temp:  98.3 °F (36.8 °C) SpO2: 96% 96% Weight:  50.8 kg (112 lb) Height:  5' 0.5\" (1.537 m) Physical Exam  
Constitutional: She is oriented to person, place, and time. She appears well-developed and well-nourished. No distress. HENT:  
Head: Normocephalic and atraumatic. Eyes: Conjunctivae are normal. No scleral icterus. Neck: Neck supple. No tracheal deviation present. Cardiovascular: Normal rate, regular rhythm, normal heart sounds and intact distal pulses. Exam reveals no gallop and no friction rub. No murmur heard. Pulmonary/Chest: Effort normal and breath sounds normal. She has no wheezes. She has no rales. Abdominal: Soft. She exhibits no distension. There is no tenderness. There is no rebound and no guarding. Musculoskeletal: She exhibits no edema. Neurological: She is alert and oriented to person, place, and time. Skin: Skin is warm and dry. No rash noted. Psychiatric: She has a normal mood and affect. Nursing note and vitals reviewed. Note written by Dinorah Lamar, as dictated by Joo Amor MD 7:39 PM  
 
MDM Procedures ED EKG interpretation: 
Rhythm: sinus rhythm with 1st degree AV block; and regular .  Rate (approx.): 81 
 Note written by Shannon Mckeon, as dictated by Dominguez White MD 8:49 PM 
 
 
Assess and plan     the patient has new onset difficulty with speech old stroke on CT needs further workup for possible CVA also has acute UTI we'll admit to hospitalist service for further workup.

## 2019-01-28 NOTE — PROGRESS NOTES
Speech Pathology bedside swallow evaluation/discharge Patient: Justo Parrish (89 y.o. female) Date: 1/28/2019 Primary Diagnosis: Altered mental status Slurred speech Difficulty walking Precautions: none ASSESSMENT : 
Based on the objective data described below, the patient presents with functional appearing swallow with meal tray including chewy meat and rice. Patient with occasional slow oral phase but WNL considering items given. She appeared to deny dysphagia but patient had no family present and speaks a dialect not available on  phones. Family is expected later today, SLP or other staff will ask them if speech is at baseline or appears aphasic and we will eval speech if needed. Appears able to tolerate diet, I would like to ask her more questions about swallow but thorough eval done with meal today. Skilled acute therapy provided by a speech-language pathologist is not indicated at this time [id number] SEE ABOVE RE: COMMUNICATION. PLAN : 
Recommendations: 1. Continue regular diet 2. SLP to possibly eval for communicaiton as decreased communication was present at admission. Discharge Recommendations: To Be Determined SUBJECTIVE:  
Patient stated no meat clearly when she wanted to switch to rice on tray and \"thank you\" clearly several times OBJECTIVE:  
 
Past Medical History:  
Diagnosis Date  Diabetes ([facility] [address]  Hypertension  Stroke ([facility] [address] History reviewed. No pertinent surgical history. Prior Level of Function/Home Situation:  
Home Situation Home Environment: Private residence One/Two Story Residence: Two story Living Alone: No 
Support Systems: Child(alexus), Family member(s) Patient Expects to be Discharged to[de-identified] Private residence Current DME Used/Available at Home: None Diet prior to admission: unknown, appears to be unrestricted Current Diet:  regular Cognitive and Communication Status: 
Neurologic State: Alert Orientation Level: Unable to verbalize Cognition: (follows visual commands) Oral Assessment: 
Oral Assessment Labial: No impairment Dentition: Natural 
Oral Hygiene: clean Lingual: (ridges in side of tongue) Mandible: No impairment P.O. Trials: 
Patient Position: upright in bed, repositioned with RN assist.  
Vocal quality prior to P.O.: (WNL with speech but very low volume with sustained \"aaaah. \") Consistency Presented: Solid; Thin liquid;Mechanical soft How Presented: SLP-fed/presented How Much: (most of meal plate) Bolus Acceptance: No impairment Bolus Formation/Control: No impairment(some residue on R between bites but did not appear to be true pocketing. ) Oral Residue: (needed sips of liquid to clear) Initiation of Swallow: No impairment Laryngeal Elevation: Functional 
Aspiration Signs/Symptoms: None Pharyngeal Phase Characteristics: (patient denied difficulty with swallow using speeech and gestures, but limited english. No difficulty noted. ) Cues for Modifications: None Oral Phase Severity: Minimal 
Pharyngeal Phase Severity : No impairment NOMS:  
The NOMS functional outcome measure was used to quantify this patient's level of swallowing impairment. Based on the NOMS, the patient was determined to be at level 7 for swallow function NOMS Swallowing Levels: 
Level 1 (CN): NPO Level 2 (CM): NPO but takes consistency in therapy Level 3 (CL): Takes less than 50% of nutrition p.o. and continues with nonoral feedings; and/or safe with mod cues; and/or max diet restriction Level 4 (CK): Safe swallow but needs mod cues; and/or mod diet restriction; and/or still requires some nonoral feeding/supplements Level 5 (CJ): Safe swallow with min diet restriction; and/or needs min cues Level 6 (CI): Independent with p.o.; rare cues; usually self cues; may need to avoid some foods or needs extra time Level 7 (CH): Independent for all p.o. KAVITHA. (2003). National Outcomes Measurement System (NOMS): Adult Speech-Language Pathology User's Guide. Pain: 
Pain Scale 1: Adult Nonverbal Pain Scale Pain Intensity 1: 0 After treatment:  
[] Patient left in no apparent distress sitting up in chair 
[x] Patient left in no apparent distress in bed 
[x] Call bell left within reach [x] Nursing notified 
[] Caregiver present 
[] Bed alarm activated COMMUNICATION/EDUCATION:  
The patients plan of care including findings, recommendations, and recommended diet changes were discussed with: Registered Nurse. Patient was educated using gestures to sit up straight while eating. Would benefit from additional education when family present. [] Posted safety precautions in patient's room. [] Patient/family have participated as able and agree with findings and recommendations. [x] Patient was agreeable to participation in eval and mealtime Thank you for this referral. 
Steph Milligan, SLP Time Calculation: 19 mins

## 2019-01-28 NOTE — PROGRESS NOTES
Hospitalist Progress Note Juli Malin MD 
     
                             Answering service: [phone] OR [phone] from in house phone Date of Service:  2019 NAME:  Andres Orozco :  1955 MRN:  [id number] Admission Summary:  
78-year-old Maida female with past medical history of type 2 diabetes mellitus, hypertension, and CVA, was brought to the emergency department from home with reported generalized weakness, slurred speech, difficulty walking. Reason for follow up:  
 Possible recurrent CVA. Assessment & Plan:  
 
1) CNS: Resolving acute encephalopathy probably due to recurrent CVA. Old large right MCA  CVA with some residual left-sided weakness. CT head negative for any acute findings. For MRI brain and Carotid Dopplers. Continue Aspirin, statin and neurochecks. Neurology eval noted and appreciated. 2) CVS: Primary Hypertension. Will allow some permissive hypertension in the setting of probable recurrent CVA. Lipid profile and 2D ECHO check. 3) Endocrine: Uncontrolled Insulin treated type 2 DM. A1C check. Accucheck monitoring with sliding scale insulin. 4) GI: H/O GERD. 5) Rehab: PT/OT and speech evaluations. 6) Prophylaxis: DVT 7) Directives: Full Code with a guarded prognosis. D/W family. 8) Plan: Anticipate D/C to [facility] for ongoing therapy in 2-3 days. D/W patient and patient's daughter [employer] who can be reached at [phone] Hospital Problems  Date Reviewed: 2019 Codes Class Noted POA Slurred speech ICD-10-CM: R47.81 ICD-9-CM: 784.59  2019 Unknown * (Principal) Altered mental status ICD-10-CM: R41.82 
ICD-9-CM: 780.97  2019 Unknown Difficulty walking ICD-10-CM: R26.2 ICD-9-CM: 719.7  2019 Unknown Review of Systems:  
  
 
Physical Examination:  
 
 Last 24hrs VS reviewed since prior progress note. Most recent are: 
Visit Vitals /70 (BP 1 Location: Right arm, BP Patient Position: At rest) Pulse 85 Temp 97.1 °F (36.2 °C) Resp 20 Ht 5' 0.5\" (1.537 m) Wt 48.7 kg (107 lb 5.8 oz) SpO2 95% Breastfeeding? No  
BMI 20.62 kg/m² Constitutional:  No acute distress, cooperative, pleasant   
           
Resp:  CTA bilaterally. No wheezing/rhonchi/rales. No accessory muscle use CV:  Regular rhythm, normal rate, no murmurs, gallops, rubs GI:  Soft, non distended, non tender. normoactive bowel sounds, no hepatosplenomegaly :  No CVA or suprapubic tenderness Skin  :  No rashes. Musculoskeletal:  No edema, warm, 2+ pulses throughout Neurologic:  Awake. Alert. Power 4/5 RUE and RLE, 3-4/5 LUE and LLE Intake/Output Summary (Last 24 hours) at 1/28/2019 1449 Last data filed at 1/28/2019 7637 Gross per 24 hour Intake 120 ml Output  Net 120 ml Data Review:  
 
 
Labs:  
 
Recent Labs  
  01/28/19 
0650 01/27/19 1856 WBC 7.5 9.1 HGB 13.3 15.0  
HCT 40.4 44.6 * 618* Recent Labs  
  01/28/19 
0650 01/27/19 1856  135* K 3.7 3.5  97 CO2 28 27 BUN 12 14 CREA 1.10* 1.17* * 174* CA 8.2* 8.7 MG 1.7  --   
PHOS 3.1  --   
 
Recent Labs  
  01/27/19 1856 SGOT 25 ALT 27 AP 86 TBILI 0.3 TP 8.3* ALB 3.8 GLOB 4.5* No results for input(s): INR, PTP, APTT in the last 72 hours. No lab exists for component: INREXT No results for input(s): FE, TIBC, PSAT, FERR in the last 72 hours. No results found for: FOL, RBCF No results for input(s): PH, PCO2, PO2 in the last 72 hours. Recent Labs  
  01/27/19 
1856 TROIQ <0.05 Lab Results Component Value Date/Time  Cholesterol, total 106 05/07/2018 03:17 AM  
 HDL Cholesterol 25 05/07/2018 03:17 AM  
 LDL,Direct 119 (H) 01/25/2013 03:10 PM  
 LDL, calculated 61.6 05/07/2018 03:17 AM  
 Triglyceride 97 05/07/2018 03:17 AM  
 CHOL/HDL Ratio 4.2 05/07/2018 03:17 AM  
 
Lab Results Component Value Date/Time Glucose (POC) 213 (H) 01/28/2019 11:18 AM  
 Glucose (POC) 126 (H) 01/28/2019 07:39 AM  
 Glucose (POC) 243 (H) 05/09/2018 11:43 AM  
 Glucose (POC) 163 (H) 05/09/2018 07:34 AM  
 Glucose (POC) 238 (H) 05/08/2018 09:04 PM  
 Glucose POC nf 109 08/17/2018 10:16 AM  
 Glucose  non fasting 06/15/2018 02:05 PM  
 Glucose POC  05/11/2018 02:44 PM  
 
Lab Results Component Value Date/Time Color YELLOW/STRAW 01/28/2019 06:53 AM  
 Appearance CLEAR 01/28/2019 06:53 AM  
 Specific gravity 1.007 01/28/2019 06:53 AM  
 pH (UA) 6.0 01/28/2019 06:53 AM  
 Protein NEGATIVE  01/28/2019 06:53 AM  
 Glucose NEGATIVE  01/28/2019 06:53 AM  
 Ketone NEGATIVE  01/28/2019 06:53 AM  
 Bilirubin NEGATIVE  01/28/2019 06:53 AM  
 Urobilinogen 0.2 01/28/2019 06:53 AM  
 Nitrites NEGATIVE  01/28/2019 06:53 AM  
 Leukocyte Esterase NEGATIVE  01/28/2019 06:53 AM  
 Epithelial cells FEW 01/28/2019 06:53 AM  
 Bacteria NEGATIVE  01/28/2019 06:53 AM  
 WBC 0-4 01/28/2019 06:53 AM  
 RBC 0-5 01/28/2019 06:53 AM  
 
 
 
Medications Reviewed:  
 
Current Facility-Administered Medications Medication Dose Route Frequency  cefTRIAXone (ROCEPHIN) 1 g in 0.9% sodium chloride (MBP/ADV) 50 mL  1 g IntraVENous Q24H  
 levETIRAcetam (KEPPRA) 250 mg in 0.9% sodium chloride 100 mL IVPB  250 mg IntraVENous 7am  
 levETIRAcetam (KEPPRA) 500 mg in 0.9% sodium chloride 100 mL IVPB  500 mg IntraVENous QPM  
 glucose chewable tablet 16 g  4 Tab Oral PRN  
 dextrose (D50W) injection syrg 12.5-25 g  25-50 mL IntraVENous PRN  
 glucagon (GLUCAGEN) injection 1 mg  1 mg IntraMUSCular PRN  
 insulin lispro (HUMALOG) injection   SubCUTAneous AC&HS  clopidogrel (PLAVIX) tablet 75 mg  75 mg Oral DAILY  sodium chloride (NS) flush 5-40 mL  5-40 mL IntraVENous Q8H  
  sodium chloride (NS) flush 5-40 mL  5-40 mL IntraVENous PRN  
 0.9% sodium chloride infusion  125 mL/hr IntraVENous CONTINUOUS  
 
______________________________________________________________________ EXPECTED LENGTH OF STAY: 2d 14h ACTUAL LENGTH OF STAY:          1 Chio Peters MD

## 2019-01-28 NOTE — CONSULTS
NEUROLOGY 
1/28/2019 Consulted by: Ebenezer Onofre MD   
 
 
Patient ID: Teodora Mcgrath [id number] 
56 y.o. 
1955 CC: Weakness HPI 
79-year-old woman from [place] here in the hospital for suspected stroke. She is mostly nonverbal and does not speak [place]. I reviewed the medical record. She had an MRI in May 2018 which showed a new small right basal ganglia infarct but a very large chronic right MCA infarct. She is here because of suspected weakness increasing from her baseline. Head CT showing an old infarct only. Possibly she has a UTI. Looking at the medical record she was discharged on baby aspirin after her stroke in May. Review of Systems Unable to perform ROS: Patient nonverbal  
 
 
Past Medical History:  
Diagnosis Date  Diabetes ([facility]  Hypertension  Stroke ([facility] History reviewed. No pertinent family history. Social History Socioeconomic History  Marital status:  Spouse name: Not on file  Number of children: Not on file  Years of education: Not on file  Highest education level: Not on file Social Needs  Financial resource strain: Not on file  Food insecurity - worry: Not on file  Food insecurity - inability: Not on file  Transportation needs - medical: Not on file  Transportation needs - non-medical: Not on file Occupational History  Not on file Tobacco Use  Smoking status: Never Smoker  Smokeless tobacco: Never Used Substance and Sexual Activity  Alcohol use: No  
  Alcohol/week: 0.0 oz  Drug use: No  
 Sexual activity: Not on file Other Topics Concern  Not on file Social History Narrative  Not on file Current Facility-Administered Medications Medication Dose Route Frequency  cefTRIAXone (ROCEPHIN) 1 g in 0.9% sodium chloride (MBP/ADV) 50 mL  1 g IntraVENous Q24H  
 levETIRAcetam (KEPPRA) 250 mg in 0.9% sodium chloride 100 mL IVPB  250 mg IntraVENous 7am  
  levETIRAcetam (KEPPRA) 500 mg in 0.9% sodium chloride 100 mL IVPB  500 mg IntraVENous QPM  
 aspirin (ASA) suppository 300 mg  300 mg Rectal DAILY  sodium chloride (NS) flush 5-40 mL  5-40 mL IntraVENous Q8H  
 sodium chloride (NS) flush 5-40 mL  5-40 mL IntraVENous PRN  
 0.9% sodium chloride infusion  125 mL/hr IntraVENous CONTINUOUS No Known Allergies Visit Vitals /69 (BP 1 Location: Right arm, BP Patient Position: At rest) Pulse 82 Temp 97.9 °F (36.6 °C) Resp 21 Ht 5' 0.5\" (1.537 m) Wt 48.7 kg (107 lb 5.8 oz) SpO2 95% Breastfeeding? No  
BMI 20.62 kg/m² Physical Exam  
Constitutional: She appears well-developed and well-nourished. frail Cardiovascular: Normal rate. Pulmonary/Chest: Effort normal.  
Skin: Skin is warm and dry. Vitals reviewed. Neurologic Exam  
 
Mental Status Elderly woman in bed. She tracks examiner. She can tell me her name. She does not follow commands well due to comprehension impairment. I am not sure if she has a true aphasia. Pupils appear equal 
Face is grossly symmetric on smile Tongue is midline Cannot assess motor exam very well. She does not understand my directions. Sensation testing unreliable Gait deferred due to condition Lab Results Component Value Date/Time WBC 7.5 01/28/2019 06:50 AM  
 HGB 13.3 01/28/2019 06:50 AM  
 Hemoglobin (POC) 123 fasting 03/12/2014 11:09 AM  
 Hemoglobin (POC) 12.9 08/26/2010 08:05 PM  
 HCT 40.4 01/28/2019 06:50 AM  
 Hematocrit (POC) 38 08/26/2010 08:05 PM  
 PLATELET 462 (H) 50/79/8362 06:50 AM  
 MCV 87.6 01/28/2019 06:50 AM  
 
Lab Results Component Value Date/Time  Hemoglobin A1c 9.3 (H) 08/17/2018 10:48 AM  
 Hemoglobin A1c 11.3 (H) 06/15/2018 03:15 PM  
 Hemoglobin A1c 13.4 (H) 05/07/2018 03:17 AM  
 Glucose 122 (H) 01/28/2019 06:50 AM  
 Glucose (POC) 213 (H) 01/28/2019 11:18 AM  
 Microalbumin/Creat ratio (mg/g creat) 7 06/08/2017 02:16 PM  
 Microalbumin,urine random 1.03 06/08/2017 02:16 PM  
 LDL,Direct 119 (H) 01/25/2013 03:10 PM  
 LDL, calculated 61.6 05/07/2018 03:17 AM  
 Creatinine (POC) 0.6 08/26/2010 08:05 PM  
 Creatinine 1.10 (H) 01/28/2019 06:50 AM  
  
Lab Results Component Value Date/Time Cholesterol, total 106 05/07/2018 03:17 AM  
 HDL Cholesterol 25 05/07/2018 03:17 AM  
 LDL,Direct 119 (H) 01/25/2013 03:10 PM  
 LDL, calculated 61.6 05/07/2018 03:17 AM  
 Triglyceride 97 05/07/2018 03:17 AM  
 CHOL/HDL Ratio 4.2 05/07/2018 03:17 AM  
 
Lab Results Component Value Date/Time ALT (SGPT) 27 01/27/2019 06:56 PM  
 AST (SGOT) 25 01/27/2019 06:56 PM  
 Alk. phosphatase 86 01/27/2019 06:56 PM  
 Bilirubin, direct 0.1 01/25/2013 03:10 PM  
 Bilirubin, total 0.3 01/27/2019 06:56 PM  
 Albumin 3.8 01/27/2019 06:56 PM  
 Protein, total 8.3 (H) 01/27/2019 06:56 PM  
 Ammonia 21 01/28/2019 06:50 AM  
 PLATELET 713 (H) 17/86/3750 06:50 AM  
 Hepatitis C Ab Negative 03/27/2012 01:30 PM  
 Hep B surface Ag Negative 03/27/2012 01:30 PM  
  
 
CT Results (maximum last 3): Results from Hospital Encounter encounter on 01/27/19 CT HEAD WO CONT Narrative EXAM: CT HEAD WO CONT INDICATION: Altered mental status. COMPARISON: CT brain 5/6/2018. [facility] CONTRAST: None. TECHNIQUE: Unenhanced CT of the head was performed using 5 mm images. Brain and 
bone windows were generated. CT dose reduction was achieved through use of a 
standardized protocol tailored for this examination and automatic exposure 
control for dose modulation. FINDINGS: 
The ventricles and sulci are normal in size, shape and configuration and 
midline. There is no significant change in right parieto-occipital 
encephalomalacia. There is a grossly stable pattern of periventricular and 
subcortical white matter hypodensity. There is no intracranial hemorrhage, 
extra-axial collection, mass, mass effect or midline shift. The basilar cisterns are open. No acute infarct is identified. The bone windows demonstrate 
no abnormalities. The visualized portions of the paranasal sinuses and mastoid 
air cells are clear. Impression IMPRESSION: Chronic right posterior MCA territory infarct. Nonspecific chronic 
white matter changes most commonly seen with chronic small vessel ischemic 
and/or senescent change. No acute infarct or other acute findings. Results from Hospital Encounter encounter on 05/06/18 CT HEAD WO CONT Narrative EXAM:  CT HEAD WO CONT INDICATION:   seizure COMPARISON: 2016. CONTRAST:  None. TECHNIQUE: Unenhanced CT of the head was performed using 5 mm images. Brain and 
bone windows were generated. CT dose reduction was achieved through use of a 
standardized protocol tailored for this examination and automatic exposure 
control for dose modulation. FINDINGS: 
There is encephalomalacia in the right parietal/occipital and posterior temporal 
lobes most consistent with old infarct but this is new since 5/22/2016. No 
hemorrhage mass or acute infarction identified. There is mucosal thickening left maxillary sinus and left frontal sinus. Impression IMPRESSION: There is an old right MCA infarct which is new since 5/22/2016. No 
acute intracranial process identified. MRI Results (maximum last 3): Results from Hospital Encounter encounter on 05/06/18 MRI BRAIN WO CONT Narrative EXAM:  MRI BRAIN WO CONT Clinical history: Seizure INDICATION:  Seizure COMPARISON: CT 5/6/2018. CONTRAST:  None. TECHNIQUE: Sagittal T1, axial FLAIR, T2,T1 and gradient echo images as well as 
coronal T2 weighted images and axial diffusion weighted images of the head were 
obtained. FINDINGS:  
 
Moderate to large right frontal and right parietal infarction with 
encephalomalacia also identified in the right temporal lobe. Confluent periventricular and scattered foci of increased T2 signal intensity in the 
corona radiata and centrum semiovale. Cavernous sinuses are symmetric. Left 
maxillary sinus disease. Mild left frontal sinus disease. Sulcal and ventricular 
prominence. Normal appearing flow-voids are present in the vertebral, basilar 
and carotid artery systems. The craniocervical junction is normal. 
  
 Impression IMPRESSION:  
Punctate focus of acute infarction in the right basal ganglia. Moderate to large area of encephalomalacia in the right parietal, frontal and 
temporal lobes consistent with large remote MCA territory infarction. Mild chronic microvascular ischemic change and moderate cerebral atrophy. Left maxillary sinus disease. The findings were called to [place] on 5/7/2018 at 2:35 PM by Dr. [facility]/US/Carotid Doppler Results (maximum last 3): Results from Hospital Encounter encounter on 05/06/18 DUPLEX CAROTID BILATERAL  
 
 
PET Results (maximum last 3): No results found for this or any previous visit. Assessment and Plan 78-year-old woman with multiple risk factors for stroke with history of significant stroke who is here in the hospital for new worsening weakness by report. Unclear if she is back at her baseline. Family is not present. She has a very significant chronic right MCA infarct. I do not see any documentation that she has passed a swallow evaluation. Start aspirin IN today. Check MRI brain and MRA of the head. Statin therapy is needed. Optimize risk factor management. I will follow-up once the imaging is done. [address] [place] [address],  
NEUROLOGIST Diplomate ABPN 
1/28/2019

## 2019-01-28 NOTE — PROGRESS NOTES
Problem: Pressure Injury - Risk of 
Goal: *Prevention of pressure injury Document Terry Scale and appropriate interventions in the flowsheet. Outcome: Progressing Towards Goal 
Pressure Injury Interventions: Activity Interventions: Increase time out of bed, Pressure redistribution bed/mattress(bed type), PT/OT evaluation Mobility Interventions: Pressure redistribution bed/mattress (bed type), PT/OT evaluation Nutrition Interventions: Document food/fluid/supplement intake, Offer support with meals,snacks and hydration

## 2019-01-28 NOTE — H&P
[address]  HISTORY AND PHYSICAL Michell Holbrook 
MR#: [id number] : 1955 ACCOUNT #: [de-identified] ADMIT DATE: 2019 CHIEF COMPLAINT:  Patient does not provide. HISTORY OF PRESENT ILLNESS:  A 71-year-old [place] female with past medical history of type 2 diabetes mellitus, hypertension, CVA, stroke, presented to the emergency department from home with reported generalized weakness, slurred speech, difficulty walking. The patient speaks [place] (dialect in [place]). No family members are present at the bedside at current. The patient's son-in-law reportedly had been here at the hospital earlier. Later I was able to speak with the patient's daughter via telephone in order to obtain some additional limited history. Remainder of history was obtained from review of ED and electronic medical records. According to these collective reports, the patient has slurred speech and generalized weakness starting on 2019 when she awoke from sleep. The patient's family reportedly noted the patient had difficulty walking and progressive generalized weakness. She reportedly had fallen with subsequent pain of her right shoulder and right side of her head. They had noted that the patient's speech remained slow and slurred. She was brought to the emergency department on 2019 for further evaluation. Initial recorded vital signs in the emergency department were blood pressure 148/81, heart rate 80, respiratory rate 18, O2 saturation 96% on room air. CT of the head without contrast showed chronic right posterior MCA territory infarct with nonspecific chronic white matter changes, small vessel ischemic disease with no acute infarct or other acute findings. Patient's daughter noted the patient has had a prior known stroke. The patient is now seen for admission to the hospitalist service for continued evaluation and treatment.   Urinalysis was abnormal but not a definite UTI as specimen was contaminated with moderate epithelial cells. Per the ED, patient had been started on Rocephin 1 gram IV x1 dose for antibiotic coverage. It was unknown if patient has had any recent loss of consciousness, syncope, chest pain, shortness of breath, cough, congestion, abdominal pain, nausea, vomiting, diarrhea, melena, dysuria, hematuria, calf pain, swelling, edema, fever, chills or rash. Initially the patient's language had not been known until later a telephone discussion with the patient's daughter. However, there had been concern for noted altered mental status and the patient's need for further evaluation of same. PAST MEDICAL HISTORY:  Type 2 diabetes mellitus, hypertension, stroke. PAST SURGICAL HISTORY:  None reported. MEDICATIONS:  Complete medication list reviewed and noted on the chart records. aspirin delayed-release 81 mg tablet    05/09/18  -- Garrick Lopez MD  
 Take 1 Tab by mouth daily. Insulin Needles, Disposable, 31 gauge x 5/16\" ndle    06/15/18  -- Deon Armendariz MD  
 Dispense insulin twice daily Notes:  pls call if this is the wrong size needle - needs matching needle for 70/30 insulin that pt already has. Frederic Torres! insulin NPH/insulin regular (NOVOLIN 70/30, HUMULIN 70/30) 100 unit/mL (70-30) injection    08/17/18  -- Deon Armendariz MD  
 25 Units by SubCUTAneous route Before breakfast and dinner. Take 25 units with breakfast and 25 units with dinner Notes:  Relion brand please  
 levETIRAcetam (KEPPRA) 250 mg tablet    05/09/18  -- Garrick Lopez MD  
 Take 1 PO QAM and 2 Tabs qPM  
 lisinopril-hydroCHLOROthiazide (PRINZIDE, ZESTORETIC) 20-25 mg per tablet    08/17/18  -- Deon Armendariz MD  
 Take 2 Tabs by mouth daily.   
 metFORMIN ER (GLUCOPHAGE XR) 500 mg tablet    12/10/18  -- Weston Hernandez MD  
 TAKE ONE TABLET BY MOUTH IN THE MORNING AND TWO AT NIGHT FOR ONE WEEK AND THEN TWO TABLETS IN THE MORNING AND TWO TABLETS AT NIGHT Notes:  Please consider 90 day supplies to promote better adherence  
 metoprolol tartrate (LOPRESSOR) 25 mg tablet    08/17/18  -- Tee Rico MD  
 Take 0.5 Tabs by mouth two (2) times a day. omeprazole (PRILOSEC) 20 mg capsule    08/17/18  -- Tee Rico MD  
 Take 1 Cap by mouth daily as needed. For stomach pain. polyethylene glycol (MIRALAX) 17 gram/dose powder    08/17/18  -- Tee Rico MD  
 Take 17 g by mouth daily. zolpidem (AMBIEN) 5 mg tablet    09/19/16  --  John Pineda MD  
 TAKE ONE TABLET BY MOUTH NIGHTLY AS NEEDED FOR SLEEP**MAX DAILY AMOUNT: 5 MG** ALLERGIES:  NO KNOWN DRUG ALLERGIES. SOCIAL HISTORY:  No reports of smoking, alcohol or illicit drugs noted on chart records. FAMILY HISTORY:  Unknown, unable to obtain from the patient. REVIEW OF SYSTEMS:  Unable to obtain complete review of systems from patient. PHYSICAL EXAMINATION: 
VITAL SIGNS:  Temperature is 98.3 degrees Fahrenheit, blood pressure 135/65, heart rate 81, respiratory rate of 17, O2 saturation 100% on room air. Recorded weight of 121 pounds (50.8 kg), recorded height of 5 feet 0 inches tall. GENERAL:  The patient in no acute respiratory distress. PSYCHIATRIC:  The patient was awake, alert and responsive. Due to language barrier, I am uncertain of orientation review. NEUROLOGIC:  Moves extremities x4. Sensation grossly intact. Withdraws to tactile stimuli without facial droop. The patient does not follow commands well enough in order to test pronator drift. HEENT:  Normocephalic, atraumatic. Pupils are 2 mm reactive. Sclerae are anicteric. Conjunctivae are clear. Nares are patent. Oropharynx is clear. Tongue is midline, nonedematous. NECK:  Supple without adenopathy, JVD, carotid bruits or thyromegaly. LYMPHS:  Negative for cervical or supraclavicular adenopathy. RESPIRATORY:  Lungs are clear to auscultation bilaterally. CARDIOVASCULAR:  Heart regular rate and rhythm; normal S1 and S2 without murmurs, rubs or gallops. GASTROINTESTINAL:  Abdomen soft, nontender, nondistended; normoactive bowel sounds. No rebound, guarding or rigidity. No auscultated abdominal bruits or pulsatile mass. BACK:  No CVA tenderness. No step-off deformity. MUSCULOSKELETAL:  No acute palpable bony deformity. Negative for calf tenderness. VASCULAR:  2+ radial, 1+ dorsalis pedis pulse without cyanosis, clubbing, edema. SKIN:  Warm and dry. LABORATORY DATA:  I reviewed. Sodium 135, potassium 2.5, chloride 95, CO2 of 27, BUN 14, creatinine 1.17, glucose 174, anion gap of 11, calcium 8.7, GFR 56, total bilirubin is 0.3, total protein 8.3, albumin 3.8, ALT is 27, AST 25, alkaline phosphatase 86. Troponin I of less than 0.05. ProBNP is 29. WBC 9.1, hemoglobin 15.0, hematocrit 46.6, platelets 968, neutrophils 71%. Urinalysis:  Leucocyte esterase moderate, nitrites negative, urobilinogen 0.2, bilirubin negative, blood negative, ketones negative, glucose negative, protein negative, pH is 7.0, specific gravity 1.020. WBCs of 20-50, RBCs 0-5, bacteria 1+, epithelial cells moderate. CT of the head without contrast, results are reviewed. A 12-lead EKG:  Sinus rhythm, first degree AV block, 81 beats per minute. IMPRESSION AND PLAN: 
1. Altered mental status. Rule out transient ischemic attack/stroke. Will admit patient to neuro/stroke floor. Place on neurovascular checks, fall precautions. We will order MRI of the brain, carotid Doppler, 2-D echocardiogram.  Consult with neurologist for a dysphagia screen with passes and proceed with aspirin therapy. 2.  Slurred speech. As mentioned there is some noted language barriers and initially patient's language had not been known as not documented in her records and family had not been available.   As mentioned later per telephone discussion with the patient's daughter following the initial evaluation, she notes she speaks [place] ([place] dialect). Continue plan of care as noted above. 3.  Right shoulder pain. Right humerus x-ray showed no acute fracture. 4.  Dehydration with elevated creatinine. Order IV fluids, hydration, resuscitation. Repeat the renal panel in a.m.  
5.  Type 2 diabetes mellitus. Place on Humalog insulin correction coverage, scheduled Accu-Chek and check hemoglobin A1c level in a.m. 6.  Hypertension. Monitor blood pressure closely and provide antihypertensive medicine. 7.  History of stroke. PLAN:   
1. As noted. The patient only had been on Keppra per the chart records. Would like to obtain a current updated list of patient's medicines. 2.  Generalized weakness. Place on fall precautions. Abnormal urinalysis. Place an order for nurses to recollect via straight catheterized specimen (sterile) for urinalysis. If positive for UTI then continue Rocephin 1 gram IV every 24 hours. 3.  Venous thromboembolism prophylaxis. SCDs to lower extremities. MD TANNA Krause/[place] 
D: 01/28/2019 00:20 T: 01/28/2019 01:28 
JOB #: [id number]

## 2019-01-29 ENCOUNTER — APPOINTMENT (OUTPATIENT)
Dept: VASCULAR SURGERY | Age: 64
DRG: 065 | End: 2019-01-29
Attending: FAMILY MEDICINE
Payer: SUBSIDIZED

## 2019-01-29 LAB
BACTERIA SPEC CULT: NORMAL
CC UR VC: NORMAL
CHOLEST SERPL-MCNC: 107 MG/DL
EST. AVERAGE GLUCOSE BLD GHB EST-MCNC: 255 MG/DL
GLUCOSE BLD STRIP.AUTO-MCNC: 146 MG/DL (ref 65–100)
GLUCOSE BLD STRIP.AUTO-MCNC: 152 MG/DL (ref 65–100)
GLUCOSE BLD STRIP.AUTO-MCNC: 168 MG/DL (ref 65–100)
GLUCOSE BLD STRIP.AUTO-MCNC: 212 MG/DL (ref 65–100)
HBA1C MFR BLD: 10.5 % (ref 4.2–6.3)
HDLC SERPL-MCNC: 31 MG/DL
HDLC SERPL: 3.5 {RATIO} (ref 0–5)
LDLC SERPL CALC-MCNC: 54.6 MG/DL (ref 0–100)
LIPID PROFILE,FLP: NORMAL
SERVICE CMNT-IMP: ABNORMAL
SERVICE CMNT-IMP: NORMAL
TRIGL SERPL-MCNC: 107 MG/DL (ref ?–150)
VLDLC SERPL CALC-MCNC: 21.4 MG/DL

## 2019-01-29 PROCEDURE — 74011250636 HC RX REV CODE- 250/636: Performed by: INTERNAL MEDICINE

## 2019-01-29 PROCEDURE — 74011250637 HC RX REV CODE- 250/637: Performed by: FAMILY MEDICINE

## 2019-01-29 PROCEDURE — 97116 GAIT TRAINING THERAPY: CPT

## 2019-01-29 PROCEDURE — 74011000258 HC RX REV CODE- 258: Performed by: FAMILY MEDICINE

## 2019-01-29 PROCEDURE — 97161 PT EVAL LOW COMPLEX 20 MIN: CPT

## 2019-01-29 PROCEDURE — 65660000000 HC RM CCU STEPDOWN

## 2019-01-29 PROCEDURE — 93880 EXTRACRANIAL BILAT STUDY: CPT

## 2019-01-29 PROCEDURE — 74011250637 HC RX REV CODE- 250/637: Performed by: INTERNAL MEDICINE

## 2019-01-29 PROCEDURE — 82962 GLUCOSE BLOOD TEST: CPT

## 2019-01-29 PROCEDURE — 74011250636 HC RX REV CODE- 250/636: Performed by: FAMILY MEDICINE

## 2019-01-29 PROCEDURE — 74011636637 HC RX REV CODE- 636/637: Performed by: INTERNAL MEDICINE

## 2019-01-29 PROCEDURE — 74011250637 HC RX REV CODE- 250/637: Performed by: PSYCHIATRY & NEUROLOGY

## 2019-01-29 RX ORDER — LEVETIRACETAM 250 MG/1
250 TABLET ORAL
Status: DISCONTINUED | OUTPATIENT
Start: 2019-01-30 | End: 2019-02-01 | Stop reason: HOSPADM

## 2019-01-29 RX ORDER — HYDRALAZINE HYDROCHLORIDE 20 MG/ML
10 INJECTION INTRAMUSCULAR; INTRAVENOUS
Status: DISCONTINUED | OUTPATIENT
Start: 2019-01-29 | End: 2019-02-01 | Stop reason: HOSPADM

## 2019-01-29 RX ORDER — HEPARIN SODIUM 5000 [USP'U]/ML
5000 INJECTION, SOLUTION INTRAVENOUS; SUBCUTANEOUS EVERY 12 HOURS
Status: DISCONTINUED | OUTPATIENT
Start: 2019-01-29 | End: 2019-02-01 | Stop reason: HOSPADM

## 2019-01-29 RX ORDER — ATORVASTATIN CALCIUM 10 MG/1
10 TABLET, FILM COATED ORAL DAILY
Status: DISCONTINUED | OUTPATIENT
Start: 2019-01-29 | End: 2019-02-01 | Stop reason: HOSPADM

## 2019-01-29 RX ORDER — GUAIFENESIN 100 MG/5ML
81 LIQUID (ML) ORAL DAILY
Status: DISCONTINUED | OUTPATIENT
Start: 2019-01-29 | End: 2019-02-01 | Stop reason: HOSPADM

## 2019-01-29 RX ORDER — METOPROLOL TARTRATE 25 MG/1
12.5 TABLET, FILM COATED ORAL EVERY 12 HOURS
Status: DISCONTINUED | OUTPATIENT
Start: 2019-01-29 | End: 2019-01-30

## 2019-01-29 RX ORDER — LEVETIRACETAM 500 MG/1
500 TABLET ORAL EVERY EVENING
Status: DISCONTINUED | OUTPATIENT
Start: 2019-01-29 | End: 2019-02-01 | Stop reason: HOSPADM

## 2019-01-29 RX ORDER — CEPHALEXIN 500 MG/1
500 CAPSULE ORAL EVERY 6 HOURS
Status: DISCONTINUED | OUTPATIENT
Start: 2019-01-29 | End: 2019-01-29

## 2019-01-29 RX ORDER — CEPHALEXIN 500 MG/1
500 CAPSULE ORAL EVERY 12 HOURS
Status: DISCONTINUED | OUTPATIENT
Start: 2019-01-29 | End: 2019-02-01 | Stop reason: HOSPADM

## 2019-01-29 RX ORDER — ACETAMINOPHEN 325 MG/1
650 TABLET ORAL
Status: DISCONTINUED | OUTPATIENT
Start: 2019-01-29 | End: 2019-02-01 | Stop reason: HOSPADM

## 2019-01-29 RX ADMIN — INSULIN LISPRO 3 UNITS: 100 INJECTION, SOLUTION INTRAVENOUS; SUBCUTANEOUS at 12:30

## 2019-01-29 RX ADMIN — INSULIN LISPRO 2 UNITS: 100 INJECTION, SOLUTION INTRAVENOUS; SUBCUTANEOUS at 08:20

## 2019-01-29 RX ADMIN — INSULIN LISPRO 2 UNITS: 100 INJECTION, SOLUTION INTRAVENOUS; SUBCUTANEOUS at 17:15

## 2019-01-29 RX ADMIN — METOPROLOL TARTRATE 12.5 MG: 25 TABLET ORAL at 16:00

## 2019-01-29 RX ADMIN — CLOPIDOGREL BISULFATE 75 MG: 75 TABLET ORAL at 08:55

## 2019-01-29 RX ADMIN — HEPARIN SODIUM 5000 UNITS: 5000 INJECTION, SOLUTION INTRAVENOUS; SUBCUTANEOUS at 16:00

## 2019-01-29 RX ADMIN — LEVETIRACETAM 500 MG: 500 TABLET, FILM COATED ORAL at 17:50

## 2019-01-29 RX ADMIN — ATORVASTATIN CALCIUM 10 MG: 10 TABLET, FILM COATED ORAL at 12:00

## 2019-01-29 RX ADMIN — HYDROCHLOROTHIAZIDE: 25 TABLET ORAL at 15:00

## 2019-01-29 RX ADMIN — CEPHALEXIN 500 MG: 500 CAPSULE ORAL at 17:50

## 2019-01-29 RX ADMIN — Medication 10 ML: at 06:20

## 2019-01-29 RX ADMIN — Medication 10 ML: at 15:00

## 2019-01-29 RX ADMIN — SODIUM CHLORIDE 250 MG: 900 INJECTION, SOLUTION INTRAVENOUS at 09:00

## 2019-01-29 RX ADMIN — Medication 10 ML: at 22:17

## 2019-01-29 RX ADMIN — ASPIRIN 81 MG CHEWABLE TABLET 81 MG: 81 TABLET CHEWABLE at 12:00

## 2019-01-29 RX ADMIN — SODIUM CHLORIDE 125 ML/HR: 900 INJECTION, SOLUTION INTRAVENOUS at 04:13

## 2019-01-29 RX ADMIN — HYDRALAZINE HYDROCHLORIDE 10 MG: 20 INJECTION INTRAMUSCULAR; INTRAVENOUS at 19:08

## 2019-01-29 NOTE — PROGRESS NOTES
Hospitalist Progress Note Ramon Sandifer, MD 
Answering service: [phone] OR [phone] from in house phone Date of Service:  2019 NAME:  Teodora Mcgrath :  1955 MRN:  [id number] Admission Summary:  
76-year-old Maida female with past medical history of type 2 diabetes mellitus, hypertension, and CVA, was brought to the emergency department from home with reported generalized weakness, slurred speech, difficulty walking. Interval history / Subjective:  
Patient is seen and examined this afternoon. She was able to walk with PT but having trouble with balance. No event reported per nurse Discussed with daughter about the communication issue and not being able to participate in PT yesterday. But she was able to do so today in the presence of her [facility] Assessment & Plan: # Acute encephalopathy probably due to recurrent CVA. Improving - Old large right MCA  CVA with some residual left-sided weakness.  
- CT head negative for any acute findings - MRI Severe focal stenosis in the proximal right P2 segment and Moderate focal stenosis in the proximal right M2 posterior division. 
- Continue Aspirin,Plavix and statin  
- neurochecks. - Neurology on board, eval noted and appreciated. 
- PT/OT and speech  
- echo EF 70% and carotid doppler pending  
  
# Primary Hypertension.  
- re-initiate home blood pressure meds: Lisinopril-hctz and metoprolol  
  
# Uncontrolled Insulin treated type 2 DM. - A1C check. - Accucheck monitoring with sliding scale insulin # CKD stage 3-4: no acute worsening  
  
Code: Full DVT Prop: Heparin  
  
  
Plan: Anticipate [facility] in 2-3 days. D/W patient and patient's daughter Marvin Cannon who can be reached at [phone] 
[facility] Problems  Date Reviewed: 2019 Codes Class Noted POA Slurred speech ICD-10-CM: R47.81 ICD-9-CM: 784.59  1/27/2019 Unknown * (Principal) Altered mental status ICD-10-CM: R41.82 
ICD-9-CM: 780.97  1/27/2019 Unknown Difficulty walking ICD-10-CM: R26.2 ICD-9-CM: 719.7  1/27/2019 Unknown Review of Systems:  
Difficult to obtain due to communication barrier Vital Signs:  
 Last 24hrs VS reviewed since prior progress note. Most recent are: 
Visit Vitals /62 (BP 1 Location: Left arm, BP Patient Position: At rest) Pulse 78 Temp 98 °F (36.7 °C) Resp 16 Ht 5' 5\" (1.651 m) Wt 53.2 kg (117 lb 4.6 oz) SpO2 94% Breastfeeding? No  
BMI 19.52 kg/m² Intake/Output Summary (Last 24 hours) at 1/29/2019 1338 Last data filed at 1/29/2019 2023 Gross per 24 hour Intake 4072.09 ml Output  Net 4072.09 ml Physical Examination:  
 
 
     
Constitutional:  No acute distress, low mood ENT:  Oral mucous moist, oropharynx benign. Neck supple, Resp:  CTA bilaterally. No wheezing/rhonchi/rales. No accessory muscle use CV:  Regular rhythm, normal rate, no murmurs, gallops, rubs GI:  Soft, non distended, non tender. normoactive bowel sounds, no hepatosplenomegaly Musculoskeletal:  No edema, warm, 2+ pulses throughout Neurologic:  Moves all extremities. With RUE 4/5 Data Review: I Personally reviewed labs and imaging Labs:  
 
Recent Labs  
  01/28/19 
0650 01/27/19 
1856 WBC 7.5 9.1 HGB 13.3 15.0  
HCT 40.4 44.6 * 618* Recent Labs  
  01/28/19 
0650 01/27/19 
1856  135* K 3.7 3.5  97 CO2 28 27 BUN 12 14 CREA 1.10* 1.17* * 174* CA 8.2* 8.7 MG 1.7  --   
PHOS 3.1  --   
 
Recent Labs  
  01/27/19 
1856 SGOT 25 ALT 27 AP 86 TBILI 0.3 TP 8.3* ALB 3.8 GLOB 4.5* No results for input(s): INR, PTP, APTT in the last 72 hours.  
 
No lab exists for component: INREXT  
 No results for input(s): FE, TIBC, PSAT, FERR in the last 72 hours. No results found for: FOL, RBCF No results for input(s): PH, PCO2, PO2 in the last 72 hours. Recent Labs  
  01/27/19 
1856 TROIQ <0.05 Lab Results Component Value Date/Time Cholesterol, total 107 01/28/2019 06:50 AM  
 HDL Cholesterol 31 01/28/2019 06:50 AM  
 LDL,Direct 119 (H) 01/25/2013 03:10 PM  
 LDL, calculated 54.6 01/28/2019 06:50 AM  
 Triglyceride 107 01/28/2019 06:50 AM  
 CHOL/HDL Ratio 3.5 01/28/2019 06:50 AM  
 
Lab Results Component Value Date/Time Glucose (POC) 212 (H) 01/29/2019 11:53 AM  
 Glucose (POC) 146 (H) 01/29/2019 07:15 AM  
 Glucose (POC) 188 (H) 01/28/2019 10:01 PM  
 Glucose (POC) 184 (H) 01/28/2019 04:40 PM  
 Glucose (POC) 213 (H) 01/28/2019 11:18 AM  
 
Lab Results Component Value Date/Time Color YELLOW/STRAW 01/28/2019 06:53 AM  
 Appearance CLEAR 01/28/2019 06:53 AM  
 Specific gravity 1.007 01/28/2019 06:53 AM  
 pH (UA) 6.0 01/28/2019 06:53 AM  
 Protein NEGATIVE  01/28/2019 06:53 AM  
 Glucose NEGATIVE  01/28/2019 06:53 AM  
 Ketone NEGATIVE  01/28/2019 06:53 AM  
 Bilirubin NEGATIVE  01/28/2019 06:53 AM  
 Urobilinogen 0.2 01/28/2019 06:53 AM  
 Nitrites NEGATIVE  01/28/2019 06:53 AM  
 Leukocyte Esterase NEGATIVE  01/28/2019 06:53 AM  
 Epithelial cells FEW 01/28/2019 06:53 AM  
 Bacteria NEGATIVE  01/28/2019 06:53 AM  
 WBC 0-4 01/28/2019 06:53 AM  
 RBC 0-5 01/28/2019 06:53 AM  
 
 
 
Medications Reviewed:  
 
Current Facility-Administered Medications Medication Dose Route Frequency  aspirin chewable tablet 81 mg  81 mg Oral DAILY  atorvastatin (LIPITOR) tablet 10 mg  10 mg Oral DAILY  levETIRAcetam (KEPPRA) tablet 500 mg  500 mg Oral QPM  
 [START ON 1/30/2019] levETIRAcetam (KEPPRA) tablet 250 mg  250 mg Oral 7am  
 acetaminophen (TYLENOL) tablet 650 mg  650 mg Oral Q6H PRN  
 cefTRIAXone (ROCEPHIN) 1 g in 0.9% sodium chloride (MBP/ADV) 50 mL  1 g IntraVENous Q24H  glucose chewable tablet 16 g  4 Tab Oral PRN  
 dextrose (D50W) injection syrg 12.5-25 g  25-50 mL IntraVENous PRN  
 glucagon (GLUCAGEN) injection 1 mg  1 mg IntraMUSCular PRN  
 insulin lispro (HUMALOG) injection   SubCUTAneous AC&HS  clopidogrel (PLAVIX) tablet 75 mg  75 mg Oral DAILY  sodium chloride (NS) flush 5-40 mL  5-40 mL IntraVENous Q8H  
 sodium chloride (NS) flush 5-40 mL  5-40 mL IntraVENous PRN  
 
______________________________________________________________________ EXPECTED LENGTH OF STAY: 3d 7h 
ACTUAL LENGTH OF STAY:          2 Kristi Henriquez MD  
Patient has given Verbal permission to discuss medical care with  
persons present in the room and and also with contact as listed on face sheet.

## 2019-01-29 NOTE — PROGRESS NOTES
Problem: Mobility Impaired (Adult and Pediatric) Goal: *Acute Goals and Plan of Care (Insert Text) Physical Therapy Goals Initiated 1/29/2019 1. Patient will move from supine to sit and sit to supine  in bed with independence within 7 day(s). 2.  Patient will transfer from bed to chair and chair to bed with supervision/set-up using the least restrictive device within 7 day(s). 3.  Patient will perform sit to stand with supervision/set-up within 7 day(s). 4.  Patient will ambulate with supervision/set-up for 300 feet with the least restrictive device within 7 day(s). 5.  Patient will ascend/descend 3 stairs with 1 handrail(s) with minimal assistance/contact guard assist within 7 day(s). physical Therapy EVALUATION Patient: Milla Kerns (19 y.o. female) Date: 1/29/2019 Primary Diagnosis: Altered mental status Slurred speech Difficulty walking Precautions:   Fall ASSESSMENT :  
Based on the objective data described below, the patient presents with Minimum assistance functional transfers, Minimum assistance gait, 300 feet ambulated, and with gait belt device and trial with RW. Pt unsafe with RW and creating a greater fall risk while lifting it off the ground and running into carts in the hallway. Pt's balance most limiting for safety though can be up with staff with use of gait belt for short distances. The following are barriers to independence while in acute care:  
-Cognitive and/or behavioral: orientation, command following due to language barrier and safety awareness 
-Medical condition: strength, sitting balance and standing balance  Endurance, high fall risk 
-Other:    
 
Patient will benefit from skilled acute intervention to address the above impairments. Patients rehabilitation potential is considered to be Good Discharge recommendations: Home health (to increase independence and safety) and 24 supervision vs rehab though pt does not have insurance Patient's barriers to discharging home, in addition to above impairments: family availability to assist history of falls. Pt is home occasionally by herself and will require physical assistance for all mobility Equipment recommendations for successful discharge (if) home: Gait belt PLAN : 
Recommendations and Planned Interventions: bed mobility training, transfer training, gait training, therapeutic exercises, therapeutic activities, neuromuscular re-education and patient and family training/education Frequency/Duration: Patient will be followed by physical therapy  5 times a week to address goals. SUBJECTIVE:  
Patient stated Go.  OBJECTIVE DATA SUMMARY:  
HISTORY:   
Past Medical History:  
Diagnosis Date  Diabetes ([facility]  Hypertension  [facility] History reviewed. No pertinent surgical history. Prior Level of Function/Home Situation: Lives with family members who takes shifts during the day to stay with her. Does require assistance for some ADLs. Falls at home. No use of AD. Personal factors and/or comorbidities impacting plan of care: diabetes, HTN, stroke Home Situation Home Environment: Private residence One/Two Story Residence: Two story Living Alone: No 
Support Systems: Child(alexus), Family member(s) Patient Expects to be Discharged to[de-identified] Private residence Current DME Used/Available at Home: None EXAMINATION/PRESENTATION/DECISION MAKING: Critical Behavior: 
Neurologic State: Alert Orientation Level: Disoriented to situation, Disoriented to time, Oriented to person, Oriented to place Cognition: Follows commands, Impulsive, Memory loss, Poor safety awareness Hearing: Auditory Auditory Impairment: NoneSkin:   
Edema:  
Range Of Motion: 
AROM: Within functional limits Strength:   
Strength: Generally decreased, functional 
  
  
  
  
  
  
Tone & Sensation:  
Tone: Normal 
  
  
  
  
Sensation: Intact Coordination: Coordination: Generally decreased, functional 
Vision:  
  
Functional Mobility: 
Bed Mobility: 
Rolling: Contact guard assistance Supine to Sit: Contact guard assistance Transfers: 
Sit to Stand: Minimum assistance Stand to Sit: Minimum assistance Balance:  
Sitting: Intact Standing: Impaired Standing - Static: Fair;Constant support Standing - Dynamic : PoorAmbulation/Gait Training:Distance (ft): 300 Feet (ft) Assistive Device: Gait belt;Walker, rolling Ambulation - Level of Assistance: Minimal assistance Gait Abnormalities: Decreased step clearance;Shuffling gait; Path deviations Base of Support: Narrowed Speed/Neris: Pace decreased (<100 feet/min); Shuffled Step Length: Right shortened;Left shortened Stairs: Therapeutic Exercises:  
 
 
Functional Measure: 
Tinetti test: 
 
Sitting Balance: 1 Arises: 1 Attempts to Rise: 1 Immediate Standing Balance: 0 Standing Balance: 0 Nudged: 0 Eyes Closed: 0 Turn 360 Degrees - Continuous/Discontinuous: 0 Turn 360 Degrees - Steady/Unsteady: 0 Sitting Down: 1 Balance Score: 4 Indication of Gait: 1 
R Step Length/Height: 1 L Step Length/Height: 1 
R Foot Clearance: 1 L Foot Clearance: 1 Step Symmetry: 1 Step Continuity: 0 Path: 0 Trunk: 0 Walking Time: 0 Gait Score: 6 Total Score: 10 Tinetti Tool Score Risk of Falls 
<19 = High Fall Risk 19-24 = Moderate Fall Risk 25-28 = Low Fall Risk Tinetti ME. Performance-Oriented Assessment of Mobility Problems in Elderly Patients. [address] [address]; [id number]. (Scoring Description: PT Bulletin Feb. 10, 1993) Older adults: Kg Lindsey et al, 2009; n = [address] [facility] elderly evaluated with ABC, RIO, ADL, and IADL) · Mean RIO score for males aged 69-68 years = 26.21(3.40) · Mean RIO score for females age 69-68 years = 25.16(4.30) · Mean RIO score for males over 80 years = 23.29(6.02) · Mean RIO score for females over 80 years = 17.20(8.32) Physical Therapy Evaluation Charge Determination History Examination Presentation Decision-Making MEDIUM  Complexity : 1-2 comorbidities / personal factors will impact the outcome/ POC  MEDIUM Complexity : 3 Standardized tests and measures addressing body structure, function, activity limitation and / or participation in recreation  LOW Complexity : Stable, uncomplicated  Other outcome measures Tinetti  LOW Based on the above components, the patient evaluation is determined to be of the following complexity level: LOW Activity Tolerance:  
good+ Please refer to the flowsheet for vital signs taken during this treatment. After treatment patient left:  
Up in chair Bed alarm/tab alert on Bed/Chair-wheels locked Caregiver at bedside Call light within reach RN notified Family at bedside COMMUNICATION/EDUCATION:  
The patients plan of care was discussed with: Registered Nurse. Fall prevention education was provided and the patient/caregiver indicated understanding., Patient/family have participated as able in goal setting and plan of care. and Patient/family agree to work toward stated goals and plan of care. Thank you for this referral. 
Ramirez Barraza, PT Time Calculation: 26 mins

## 2019-01-29 NOTE — CONSULTS
Neurology Progress Note Patient ID: Champ Whittaker [id number] 
43 y.o. 
1955 Chief Complaint: New weakness and speech change Subjective:  
 
70-year-old woman with history of stroke who presented with worsening right-sided weakness and speech difficulty. Her daughter is present today. Daughter tells me that in November that her mother probably was not taking aspirin as directed and in recent weeks she has been on aspirin daily administered by her every day. MRI was completed showing a new right basal ganglia infarct. She also has intracranial atherosclerosis on MRA. No acute changes since yesterday. Speech is a little bit better per daughter. Objective:  
 
ROS: 
Cannot obtain secondary to patient medical condition Impaired language Meds: 
Current Facility-Administered Medications Medication Dose Route Frequency  aspirin chewable tablet 81 mg  81 mg Oral DAILY  atorvastatin (LIPITOR) tablet 10 mg  10 mg Oral DAILY  levETIRAcetam (KEPPRA) tablet 500 mg  500 mg Oral QPM  
 [START ON 1/30/2019] levETIRAcetam (KEPPRA) tablet 250 mg  250 mg Oral 7am  
 cefTRIAXone (ROCEPHIN) 1 g in 0.9% sodium chloride (MBP/ADV) 50 mL  1 g IntraVENous Q24H  
 glucose chewable tablet 16 g  4 Tab Oral PRN  
 dextrose (D50W) injection syrg 12.5-25 g  25-50 mL IntraVENous PRN  
 glucagon (GLUCAGEN) injection 1 mg  1 mg IntraMUSCular PRN  
 insulin lispro (HUMALOG) injection   SubCUTAneous AC&HS  clopidogrel (PLAVIX) tablet 75 mg  75 mg Oral DAILY  sodium chloride (NS) flush 5-40 mL  5-40 mL IntraVENous Q8H  
 sodium chloride (NS) flush 5-40 mL  5-40 mL IntraVENous PRN  
 0.9% sodium chloride infusion  125 mL/hr IntraVENous CONTINUOUS  
 
 
MRI Results (maximum last 3): Results from Hospital Encounter encounter on 01/27/19 MRA BRAIN WO CONT Narrative *PRELIMINARY REPORT* No acute abnormality. Preliminary report was provided by Dr. Mimi Casper, the on-call radiologist, at 3026 hours. 
 
Final report to follow. *END PRELIMINARY REPORT* EXAM: MRA BRAIN WO CONT INDICATION:   stroke COMPARISON:  MRI brain 1/28/2019. CONTRAST:  None. TECHNIQUE:   
3-D time-of-flight noncontrast MRA of the brain was performed. Multiplanar and 
MIP reconstructions were obtained. FINDINGS: 
There is no evidence of large vessel occlusion or flow-limiting stenosis of the 
intracranial internal carotid, anterior cerebral, and middle cerebral arteries. Moderate focal stenosis in the proximal right M2 posterior division. Prominent 
infundibulum at the origin of the right ophthalmic artery. The anterior 
communicating artery is patent. There is no evidence of large vessel occlusion of the intracranial vertebral 
arteries, basilar artery, or posterior cerebral arteries. Severe focal stenosis 
in the proximal right P2 segment. The posterior communicating arteries are 
patent. There is no evidence of aneurysm or vascular malformation. Impression IMPRESSION:   
1. No evidence of large vessel occlusion. 2. Severe focal stenosis in the proximal right P2 segment. 3. Moderate focal stenosis in the proximal right M2 posterior division. MRI BRAIN WO CONT Narrative *PRELIMINARY REPORT* Small left thalamic lacunar infarct. Old right parietal infarct. Preliminary report was provided by Dr. Nelson Camilo, the on-call radiologist, at [address] [place] [address]. Final report to follow. *END PRELIMINARY REPORT 
 
EXAM:  MRI BRAIN WO CONT INDICATION:    AMS/ SLURRED SPEECH. EVALUATE FOR STROKE 
 
COMPARISON:  MRI brain 5/7/2018, CT head 1/27/2019. Edu Moulton CONTRAST: None. TECHNIQUE:   
Multiplanar multisequence acquisition without contrast of the brain. FINDINGS: 
Small acute infarct in the posterior limb of the left internal capsule. Generalized parenchymal volume loss with commensurate dilation of the sulci and 
ventricular system.  Periventricular deep white matter T2/FLAIR hyperintensities, 
consistent with mild chronic microvascular ischemic disease. Large chronic 
infarct in the right parieto-occipital lobe is unchanged. Small chronic infarct 
in the right basal ganglia. There is no cerebellar tonsillar herniation. Expected arterial flow-voids are present. Circumferential mucosal thickening in the left maxillary sinus with frothy 
secretions, as well as scattered mucosal thickening in the left ethmoidal air 
cells. The mastoid air cells and middle ears are clear. The orbital contents are 
within normal limits. No significant osseous or scalp lesions are identified. Impression IMPRESSION:  
1. Small acute infarct in the posterior limb of the left internal capsule. 2. Generalized parenchymal volume loss with mild chronic microvascular ischemic 
disease. Large chronic infarct in the right parieto-occipital lobe. 3. Circumferential mucosal thickening in the left maxillary sinus with frothy 
secretions. Correlate clinically for acute sinusitis. 23X Results from Hospital Encounter encounter on 05/06/18 MRI BRAIN WO CONT Narrative EXAM:  MRI BRAIN WO CONT Clinical history: Seizure INDICATION:  Seizure COMPARISON: CT 5/6/2018. CONTRAST:  None. TECHNIQUE: Sagittal T1, axial FLAIR, T2,T1 and gradient echo images as well as 
coronal T2 weighted images and axial diffusion weighted images of the head were 
obtained. FINDINGS:  
 
Moderate to large right frontal and right parietal infarction with 
encephalomalacia also identified in the right temporal lobe. Confluent 
periventricular and scattered foci of increased T2 signal intensity in the 
corona radiata and centrum semiovale. Cavernous sinuses are symmetric. Left 
maxillary sinus disease. Mild left frontal sinus disease. Sulcal and ventricular 
prominence. Normal appearing flow-voids are present in the vertebral, basilar 
and carotid artery systems.  The craniocervical junction is normal. 
  
 Impression IMPRESSION:  
Punctate focus of acute infarction in the right basal ganglia. Moderate to large area of encephalomalacia in the right parietal, frontal and 
temporal lobes consistent with large remote MCA territory infarction. Mild chronic microvascular ischemic change and moderate cerebral atrophy. Left maxillary sinus disease. The findings were called to Benoit Lowery on 5/7/2018 at 2:35 PM by Dr. Sue Dee. [address] Lab Review Recent Results (from the past 24 hour(s)) GLUCOSE, POC Collection Time: 01/28/19  4:40 PM  
Result Value Ref Range Glucose (POC) 184 (H) 65 - 100 mg/dL Performed by HERACLIO IBARRA(CON) GLUCOSE, POC Collection Time: 01/28/19 10:01 PM  
Result Value Ref Range Glucose (POC) 188 (H) 65 - 100 mg/dL Performed by Edson Mcgrath GLUCOSE, POC Collection Time: 01/29/19  7:15 AM  
Result Value Ref Range Glucose (POC) 146 (H) 65 - 100 mg/dL Performed by Kelly Lerma Additional comments:I reviewed the patients new imaging test results. Patient Vitals for the past 8 hrs: 
 BP Temp Pulse Resp SpO2  
01/29/19 1000 135/62 98 °F (36.7 °C) 78 16 94 % 01/29/19 0605 174/64 98.3 °F (36.8 °C) 75 19 95 % No intake/output data recorded. 01/27 1901 - 01/29 0700 In: 4192.1 [P.O.:240; I.V.:3952.1] Out: - Exam: 
Visit Vitals /62 (BP 1 Location: Left arm, BP Patient Position: At rest) Pulse 78 Temp 98 °F (36.7 °C) Resp 16 Ht 5' 5\" (1.651 m) Wt 53.2 kg (117 lb 4.6 oz) SpO2 94% Breastfeeding? No  
BMI 19.52 kg/m² Gen: Well developed CV: RRR Lungs: non labored breathing Abd: soft, non distended Neuro: Awake and alert. A little bit irritable because she cannot get up and do things independently CN II-XII: PERRL,  face asymmetric Motor: Left hemiparesis Sensory: Grossly intact to LT 
DTRs: symmetric COOR: no limb/truncal ataxia Gait: Deferred due to condition PROBLEM LIST:  
 
Patient Active Problem List  
 Diagnosis Code  Diabetes mellitus type 2, controlled ([facility] [facility] E11.9  Illiteracy Z55.0  Dyspepsia R10.13  
 Osteoarthritis of right knee M17.11  
 Essential hypertension with goal blood pressure less than 140/90 I10  Seizure ([facility] R56.9  Type 2 diabetes with nephropathy (HCC) E11.21  
 Slurred speech R47.81  
 Altered mental status R41.82  Difficulty walking R26.2 Assessment/Plan:   
 
58-year-old woman who is now had a stroke in less than a year. She has intracranial vascular stenosis. I think she needs to be on dual antiplatelets baby aspirin and Plavix which I have ordered. Statin therapy to continue. Her LDL is 54. Her current statin dose is appropriate. I would not increase it. I discussed the results and plan with her daughter at the bedside. Rehab needs if any. Once she is medically cleared I anticipate she can be discharged. Signing off. Please call if needed. Signed: 
Elana Nails DO 
1/29/2019 11:47 AM

## 2019-01-29 NOTE — PROGRESS NOTES
Problem: Falls - Risk of 
Goal: *Absence of Falls Document Katy Iverson Fall Risk and appropriate interventions in the flowsheet. Fall Risk Interventions: 
Mobility Interventions: PT Consult for mobility concerns, PT Consult for assist device competence, OT consult for ADLs, Patient to call before getting OOB, Communicate number of staff needed for ambulation/transfer Mentation Interventions: Adequate sleep, hydration, pain control, Door open when patient unattended, Evaluate medications/consider consulting pharmacy Medication Interventions: Patient to call before getting OOB, Teach patient to arise slowly, Evaluate medications/consider consulting pharmacy Elimination Interventions: Call light in reach, Toileting schedule/hourly rounds History of Falls Interventions: Door open when patient unattended, Bed/chair exit alarm

## 2019-01-29 NOTE — DIABETES MGMT
DTC Progress Note Recommendations/ Comments: Chart reviewed due to hyperglycemia. If appropriate, please consider: 
 - addition of Lantus 15 units at 6pm (so that patient can resume pre-mixed insulin once discharged. Current hospital DM medication:  Humalog correction scale, normal sensitivity. Chart reviewed on Marvelyn Holstein. Patient is a 60 y.o. female with a history of diabetes on oral agent (monotherapy): metformin (generic) 
insulin injections: 70/30 25 units BID A1c:  
Lab Results Component Value Date/Time Hemoglobin A1c 10.5 (H) 01/28/2019 06:50 AM  
 Hemoglobin A1c 9.3 (H) 08/17/2018 10:48 AM  
 
 
Recent Glucose Results:  
Lab Results Component Value Date/Time GLUCPOC 212 (H) 01/29/2019 11:53 AM  
 GLUCPOC 146 (H) 01/29/2019 07:15 AM  
 GLUCPOC 188 (H) 01/28/2019 10:01 PM  
  
 
Lab Results Component Value Date/Time Creatinine 1.10 (H) 01/28/2019 06:50 AM  
 
Estimated Creatinine Clearance: 43.4 mL/min (A) (based on SCr of 1.1 mg/dL (H)). Active Orders Diet DIET DIABETIC WITH OPTIONS Consistent Carb 1800kcal; Regular; AHA-LOW-CHOL FAT  
  
 
PO intake: No data found. Will continue to follow as needed. Thank you Madelyn Vaughn MS, RN, CDE Time Spent: 5 minutes

## 2019-01-29 NOTE — PROGRESS NOTES
Speech Pathology Spoke with daughter who reports that patient appears confused, impulsive and is \"not understanding,\" which are new deficits. Daughter is on her way to work so speech-language evaluation cannot be performed at the moment, due to needing family to translate. Will perform when family is here.

## 2019-01-29 NOTE — PROGRESS NOTES
CM met with patient and daughter Tima Yi at bedside- [place] Must wants the patient to go to rehab to get stronger. CM discussed that patient does not have insurance, the only option for rehab would be to see if patient qualifies for a andrey bed, however, CM explained that patient may not qualify. The family wants to pursue a andrey bed for acute rehab. Referrals sent to [facility], [facility], and [facility] for andrey bed- the patient has a green card, copy received and faxed to both [employer] and copy placed on chart. The family stated they have applied for care card in the past, and want to know status. CM called and left a voicemail message for Tiarra Naidu, CM also provided the family with the financial assistance counselor's number, as well as extra copy of care card. The CM encouraged the family to call financial counselor for specific care card questions. The CM also called Kristin with [employer] to see if patient would qualify for assistance since she has green card- card faxed to [facility] for review. The CM explained to the family that if patient does not qualify for inpatient rehab, may have to go home with [facility] services. The patient has received help through [facility] in past, also connected to [address] [place] [address]. The patient lives with her daughter and son-in-law, family states the family is often with her, but there are periods where the patient is alone intermittently- Tima Must endorses that her daughter also assists. CM will continue to follow- referrals sent in [employer]. LANNY Medina 
 
15:52 p.m.- [address] does not have any andrey beds at this time, [facility] also does not have any andrey beds- family not agreeable for referral to [employer]. [facility] left message in [employer] that they are planning to meet with the daughter tomorrow for financial assistance application.  LANNY Medina

## 2019-01-29 NOTE — PROGRESS NOTES
Clinical Pharmacy Note: IV to PO Automatic Conversion Please note: Chaya Mcintosh medication(s) (Keppra) has/have been changed from IV to PO based on the following critiera: 
 
? Patient is taking scheduled oral medications ? Patient is tolerating tube feeds at goal rate or a full liquid, soft or regular diet This IV to PO conversion is based on the [employer] approved automatic conversion policy for eligible patients. Please call with questions.

## 2019-01-30 LAB
GLUCOSE BLD STRIP.AUTO-MCNC: 124 MG/DL (ref 65–100)
GLUCOSE BLD STRIP.AUTO-MCNC: 139 MG/DL (ref 65–100)
GLUCOSE BLD STRIP.AUTO-MCNC: 149 MG/DL (ref 65–100)
GLUCOSE BLD STRIP.AUTO-MCNC: 154 MG/DL (ref 65–100)
GLUCOSE BLD STRIP.AUTO-MCNC: 185 MG/DL (ref 65–100)
LEFT CCA DIST DIAS: 10.6 CM/S
LEFT CCA DIST SYS: 73.3 CM/S
LEFT CCA PROX DIAS: 6.2 CM/S
LEFT CCA PROX SYS: 89.8 CM/S
LEFT ECA DIAS: 8.36 CM/S
LEFT ECA SYS: 86.5 CM/S
LEFT ICA DIST DIAS: 22.8 CM/S
LEFT ICA DIST SYS: 103 CM/S
LEFT ICA MID DIAS: 25.4 CM/S
LEFT ICA MID SYS: 106.9 CM/S
LEFT ICA PROX DIAS: 23.8 CM/S
LEFT ICA PROX SYS: 98.6 CM/S
LEFT ICA/CCA SYS: 1.35
LEFT VERTEBRAL DIAS: 11.86 CM/S
LEFT VERTEBRAL SYS: 50.3 CM/S
RIGHT CCA DIST DIAS: 9.5 CM/S
RIGHT CCA DIST SYS: 61.2 CM/S
RIGHT CCA PROX DIAS: 9.5 CM/S
RIGHT CCA PROX SYS: 79.8 CM/S
RIGHT ECA DIAS: 3.96 CM/S
RIGHT ECA SYS: 78.8 CM/S
RIGHT ICA DIST DIAS: 12.8 CM/S
RIGHT ICA DIST SYS: 73.3 CM/S
RIGHT ICA MID DIAS: 20.5 CM/S
RIGHT ICA MID SYS: 89.8 CM/S
RIGHT ICA PROX DIAS: 18.3 CM/S
RIGHT ICA PROX SYS: 75.5 CM/S
RIGHT ICA/CCA SYS: 1.2
RIGHT VERTEBRAL DIAS: 6.9 CM/S
RIGHT VERTEBRAL SYS: 42.5 CM/S
SERVICE CMNT-IMP: ABNORMAL

## 2019-01-30 PROCEDURE — 74011636637 HC RX REV CODE- 636/637: Performed by: INTERNAL MEDICINE

## 2019-01-30 PROCEDURE — 74011250636 HC RX REV CODE- 250/636: Performed by: INTERNAL MEDICINE

## 2019-01-30 PROCEDURE — 74011250637 HC RX REV CODE- 250/637: Performed by: INTERNAL MEDICINE

## 2019-01-30 PROCEDURE — 97535 SELF CARE MNGMENT TRAINING: CPT | Performed by: OCCUPATIONAL THERAPIST

## 2019-01-30 PROCEDURE — 74011250637 HC RX REV CODE- 250/637: Performed by: PSYCHIATRY & NEUROLOGY

## 2019-01-30 PROCEDURE — 97165 OT EVAL LOW COMPLEX 30 MIN: CPT | Performed by: OCCUPATIONAL THERAPIST

## 2019-01-30 PROCEDURE — 65270000032 HC RM SEMIPRIVATE

## 2019-01-30 PROCEDURE — 97116 GAIT TRAINING THERAPY: CPT

## 2019-01-30 PROCEDURE — 92522 EVALUATE SPEECH PRODUCTION: CPT | Performed by: SPEECH-LANGUAGE PATHOLOGIST

## 2019-01-30 PROCEDURE — 82962 GLUCOSE BLOOD TEST: CPT

## 2019-01-30 PROCEDURE — 74011250637 HC RX REV CODE- 250/637: Performed by: FAMILY MEDICINE

## 2019-01-30 RX ORDER — METOPROLOL TARTRATE 25 MG/1
25 TABLET, FILM COATED ORAL EVERY 12 HOURS
Status: DISCONTINUED | OUTPATIENT
Start: 2019-01-30 | End: 2019-01-31

## 2019-01-30 RX ADMIN — HYDROCHLOROTHIAZIDE: 25 TABLET ORAL at 08:49

## 2019-01-30 RX ADMIN — LEVETIRACETAM 250 MG: 250 TABLET ORAL at 08:52

## 2019-01-30 RX ADMIN — METOPROLOL TARTRATE 25 MG: 25 TABLET ORAL at 21:22

## 2019-01-30 RX ADMIN — Medication 10 ML: at 05:48

## 2019-01-30 RX ADMIN — CEPHALEXIN 500 MG: 500 CAPSULE ORAL at 08:51

## 2019-01-30 RX ADMIN — ASPIRIN 81 MG CHEWABLE TABLET 81 MG: 81 TABLET CHEWABLE at 08:52

## 2019-01-30 RX ADMIN — CLOPIDOGREL BISULFATE 75 MG: 75 TABLET ORAL at 08:52

## 2019-01-30 RX ADMIN — INSULIN LISPRO 2 UNITS: 100 INJECTION, SOLUTION INTRAVENOUS; SUBCUTANEOUS at 12:18

## 2019-01-30 RX ADMIN — METOPROLOL TARTRATE 12.5 MG: 25 TABLET ORAL at 08:54

## 2019-01-30 RX ADMIN — HEPARIN SODIUM 5000 UNITS: 5000 INJECTION, SOLUTION INTRAVENOUS; SUBCUTANEOUS at 02:04

## 2019-01-30 RX ADMIN — ATORVASTATIN CALCIUM 10 MG: 10 TABLET, FILM COATED ORAL at 08:52

## 2019-01-30 RX ADMIN — CEPHALEXIN 500 MG: 500 CAPSULE ORAL at 21:22

## 2019-01-30 RX ADMIN — INSULIN LISPRO 2 UNITS: 100 INJECTION, SOLUTION INTRAVENOUS; SUBCUTANEOUS at 17:27

## 2019-01-30 RX ADMIN — Medication 10 ML: at 21:22

## 2019-01-30 RX ADMIN — LEVETIRACETAM 500 MG: 500 TABLET, FILM COATED ORAL at 17:26

## 2019-01-30 RX ADMIN — HEPARIN SODIUM 5000 UNITS: 5000 INJECTION, SOLUTION INTRAVENOUS; SUBCUTANEOUS at 15:37

## 2019-01-30 RX ADMIN — Medication 10 ML: at 14:03

## 2019-01-30 NOTE — PROGRESS NOTES
Bedside and Verbal shift change report given to Mary Estrada (oncoming nurse) by Marline Sacks RN (offgoing nurse). Report included the following information SBAR, Kardex, Intake/Output, MAR and Recent Results NSR.

## 2019-01-30 NOTE — ROUTINE PROCESS
TRANSFER - IN REPORT: 
 
Verbal report received from [address] [place] [address] rn(name) on Teodora Mcgrath  being received from 6S(unit) for routine progression of care Report consisted of patients Situation, Background, Assessment and  
Recommendations(SBAR). Information from the following report(s) SBAR and Kardex was reviewed with the receiving nurse. Opportunity for questions and clarification was provided. Assessment completed upon patients arrival to unit and care assumed.

## 2019-01-30 NOTE — PROGRESS NOTES
Problem: Communication Impaired (Adult) Goal: *Acute Goals and Plan of Care (Insert Text) Speech Therapy Goals Initiated 1/30/2019 1. Patient will learn 3 dysarthria strategies 2. Patient will use to be 95% intelligible in sentence level Speech LAnguage Pathology evaluation Patient: Maria Del Rosario Tamez (31 y.o. female) Date: 1/30/2019 Primary Diagnosis: Altered mental status Slurred speech Difficulty walking Precautions:   Fall ASSESSMENT : 
Based on the objective data described below, the patient presents with mild dysarthria c/b level facial weakness. . 
 
Patient will benefit from skilled intervention to address the above impairments. Patients rehabilitation potential is considered to be Good Factors which may influence rehabilitation potential include:  
[x]              None noted []              Mental ability/status []              Medical condition []              Home/family situation and support systems []              Safety awareness []              Pain tolerance/management 
[]              Other: PLAN : 
Recommendations and Planned Interventions: DYSARTHRIA TX AS ABOVE Frequency/Duration: Patient will be followed by speech-language pathology 3 times a week to address goals. Discharge Recommendations: Rehab SUBJECTIVE:  
Patient stated that she was not having ay trouble swallowing. OBJECTIVE:  
 
Past Medical History:  
Diagnosis Date  Diabetes ([facility] [address]  Hypertension  Stroke ([facility] [address] History reviewed. No pertinent surgical history. Prior Level of Function/Home Situation:  
Home Situation Home Environment: Private residence One/Two Story Residence: Two story Living Alone: No 
Support Systems: Child(alexus), Family member(s) Patient Expects to be Discharged to[de-identified] Private residence Current DME Used/Available at Home: NoneMental Status: 
Neurologic State: Alert Orientation Level: Disoriented to situation, Oriented to person, Oriented to place, Oriented to time Cognition: Follows commands Perception: Appears intact Motor Speech: 
Oral-Motor Structure/Motor Speech Face: (left facial weakness) Dentition: Natural 
Oral Hygiene: clean Lingual: (ridges in tongue sides) Mandible: No impairment Apraxic Characteristics: None Dysarthric Characteristics: Blended word boundaries; Decreased rate(decreased precision, usses slow rate to compensate) Intelligibility: Impaired Word Intelligibility (%): 100 % Phrase Intelligibility (%): 90 % Overall Impairment Severity: Mild Compensatory Strategies for Motor Speech: overarticulation, increased volume Language Comprehension and Expression: Auditory Comprehension Auditory Impairment: No (did not appear to have difficulty with comprehension of basic items. Daughter provided translation. ) Verbal Expression Primary Mode of Expression: Verbal 
Initiation: No impairment Automatic Speech Task: No impairment Repetition: No impairment(no impairment with phrases ) Naming: No impairment(uses ) Conversation: Dysarthric(mild dysarthria. articulatory imprecision. ) NOMS: 5 motor speech Pain: 
Pain Scale 1: Numeric (0 - 10) Pain Intensity 1: 0 After treatment:  
[]              Patient left in no apparent distress sitting up in chair 
[x]              Patient left in no apparent distress in bed 
[x]              Call bell left within reach [x]              Nursing notified 
[x]              Caregiver present 
[]              Bed alarm activated COMMUNICATION/EDUCATION:  
The patients plan of care including recommendations and planned interventions was discussed with: Registered Nurse. Patient was educated strategies for dysarthria and her daughter had good understanding with averyo. Paitent will need repetition 
[x]  Patient/family have participated as able in goal setting and plan of care. [x]  Patient/family agree to work toward stated goals and plan of care. []  Patient understands intent and goals of therapy, but is neutral about his/her participation. []  Patient is unable to participate in goal setting and plan of care. Thank you for this referral. 
Justo Guardado, SLP Time Calculation: 19 mins

## 2019-01-30 NOTE — PROGRESS NOTES
Problem: Pressure Injury - Risk of 
Goal: *Prevention of pressure injury Document Terry Scale and appropriate interventions in the flowsheet. Outcome: Progressing Towards Goal 
Pressure Injury Interventions: Activity Interventions: Increase time out of bed, Pressure redistribution bed/mattress(bed type), PT/OT evaluation Mobility Interventions: HOB 30 degrees or less, Pressure redistribution bed/mattress (bed type), PT/OT evaluation Nutrition Interventions: Document food/fluid/supplement intake Problem: Falls - Risk of 
Goal: *Absence of Falls Document Tenisha Murray Fall Risk and appropriate interventions in the flowsheet. Outcome: Progressing Towards Goal 
Fall Risk Interventions: 
Mobility Interventions: OT consult for ADLs, Patient to call before getting OOB, PT Consult for mobility concerns, PT Consult for assist device competence Mentation Interventions: Bed/chair exit alarm, Door open when patient unattended, Increase mobility, More frequent rounding, Reorient patient, Room close to nurse's station Medication Interventions: Assess postural VS orthostatic hypotension, Bed/chair exit alarm, Teach patient to arise slowly, Patient to call before getting OOB Elimination Interventions: Call light in reach, Bed/chair exit alarm History of Falls Interventions: Bed/chair exit alarm, Room close to nurse's station, Door open when patient unattended

## 2019-01-30 NOTE — PROGRESS NOTES
Hospitalist Progress Note Lillian Anna MD 
Answering service: [phone] OR [phone] from in house phone Date of Service:  2019 NAME:  Guanako Chew :  1955 MRN:  [id number] Admission Summary:  
69-year-old Maida female with past medical history of type 2 diabetes mellitus, hypertension, and CVA, was brought to the emergency department from home with reported generalized weakness, slurred speech, difficulty walking. Interval history / Subjective:  
Patient is seen and examined this morning. Due to language barrier was difficult to get if she has new complaint. No event reported per nurse Assessment & Plan: # Acute encephalopathy probably due to recurrent CVA. Improving - Old large right MCA , has hx of CVA with some residual left-sided weakness.  
- CT head negative for any acute findings - MRI Severe focal stenosis in the proximal right P2 segment and Moderate focal stenosis in the proximal right M2 posterior division. 
- Continue Aspirin,Plavix and statin  
- neurochecks. - Neurology on board, eval noted and appreciated. 
- PT/OT and speech  
- echo EF 70% and carotid doppler patent aman ICA 
  
# Primary Hypertension.  
- re-initiate home blood pressure meds: Lisinopril-hctz and metoprolol, further dose adjusted  
  
# Uncontrolled Insulin treated type 2 DM. - A1C: 10.5 
- Accucheck monitoring with sliding scale insulin # CKD stage 3-4: no acute worsening  
  
Code: Full DVT Prop: Heparin  
  
  
Plan: Possible [facility]- rehab  
  
 
  
 
Hospital Problems  Date Reviewed: 2019 Codes Class Noted POA Slurred speech ICD-10-CM: R47.81 ICD-9-CM: 784.59  2019 Unknown * (Principal) Altered mental status ICD-10-CM: R41.82 
ICD-9-CM: 780.97  2019 Unknown Difficulty walking ICD-10-CM: R26.2 ICD-9-CM: 719.7  2019 Unknown Review of Systems:  
Difficult to obtain due to communication barrier Vital Signs:  
 Last 24hrs VS reviewed since prior progress note. Most recent are: 
Visit Vitals BP (!) 141/92 (BP 1 Location: Left leg, BP Patient Position: At rest) Pulse 74 Temp 98.3 °F (36.8 °C) Resp 15 Ht 5' 5\" (1.651 m) Wt 53.9 kg (118 lb 14.4 oz) SpO2 97% Breastfeeding? No  
BMI 19.79 kg/m² No intake or output data in the 24 hours ending 01/30/19 1520 Physical Examination:  
 
 
     
Constitutional:  No acute distress, low mood ENT:  Oral mucous moist, oropharynx benign. Neck supple, Resp:  CTA bilaterally. No wheezing/rhonchi/rales. No accessory muscle use CV:  Regular rhythm, normal rate, no murmurs, gallops, rubs GI:  Soft, non distended, non tender. normoactive bowel sounds, no hepatosplenomegaly Musculoskeletal:  No edema, warm, 2+ pulses throughout Neurologic:  Moves all extremities. With RUE 4/5 Data Review: I Personally reviewed labs and imaging Labs:  
 
Recent Labs  
  01/28/19 
0650 01/27/19 1856 WBC 7.5 9.1 HGB 13.3 15.0  
HCT 40.4 44.6 * 618* Recent Labs  
  01/28/19 
0650 01/27/19 
1856  135* K 3.7 3.5  97 CO2 28 27 BUN 12 14 CREA 1.10* 1.17* * 174* CA 8.2* 8.7 MG 1.7  --   
PHOS 3.1  --   
 
Recent Labs  
  01/27/19 1856 SGOT 25 ALT 27 AP 86 TBILI 0.3 TP 8.3* ALB 3.8 GLOB 4.5* No results for input(s): INR, PTP, APTT in the last 72 hours. No lab exists for component: INREXT, INREXT No results for input(s): FE, TIBC, PSAT, FERR in the last 72 hours. No results found for: FOL, RBCF No results for input(s): PH, PCO2, PO2 in the last 72 hours. Recent Labs  
  01/27/19 1856 TROIQ <0.05 Lab Results Component Value Date/Time  Cholesterol, total 107 01/28/2019 06:50 AM  
 HDL Cholesterol 31 01/28/2019 06:50 AM  
 LDL,Direct 119 (H) 01/25/2013 03:10 PM  
 LDL, calculated 54.6 01/28/2019 06:50 AM  
 Triglyceride 107 01/28/2019 06:50 AM  
 CHOL/HDL Ratio 3.5 01/28/2019 06:50 AM  
 
Lab Results Component Value Date/Time Glucose (POC) 185 (H) 01/30/2019 11:40 AM  
 Glucose (POC) 139 (H) 01/30/2019 06:50 AM  
 Glucose (POC) 152 (H) 01/29/2019 10:20 PM  
 Glucose (POC) 168 (H) 01/29/2019 04:25 PM  
 Glucose (POC) 212 (H) 01/29/2019 11:53 AM  
 
Lab Results Component Value Date/Time Color YELLOW/STRAW 01/28/2019 06:53 AM  
 Appearance CLEAR 01/28/2019 06:53 AM  
 Specific gravity 1.007 01/28/2019 06:53 AM  
 pH (UA) 6.0 01/28/2019 06:53 AM  
 Protein NEGATIVE  01/28/2019 06:53 AM  
 Glucose NEGATIVE  01/28/2019 06:53 AM  
 Ketone NEGATIVE  01/28/2019 06:53 AM  
 Bilirubin NEGATIVE  01/28/2019 06:53 AM  
 Urobilinogen 0.2 01/28/2019 06:53 AM  
 Nitrites NEGATIVE  01/28/2019 06:53 AM  
 Leukocyte Esterase NEGATIVE  01/28/2019 06:53 AM  
 Epithelial cells FEW 01/28/2019 06:53 AM  
 Bacteria NEGATIVE  01/28/2019 06:53 AM  
 WBC 0-4 01/28/2019 06:53 AM  
 RBC 0-5 01/28/2019 06:53 AM  
 
 
 
Medications Reviewed:  
 
Current Facility-Administered Medications Medication Dose Route Frequency  aspirin chewable tablet 81 mg  81 mg Oral DAILY  atorvastatin (LIPITOR) tablet 10 mg  10 mg Oral DAILY  levETIRAcetam (KEPPRA) tablet 500 mg  500 mg Oral QPM  
 levETIRAcetam (KEPPRA) tablet 250 mg  250 mg Oral 7am  
 acetaminophen (TYLENOL) tablet 650 mg  650 mg Oral Q6H PRN  
 lisinopril/hydroCHLOROthiazide(PRINZIDE/ZESTORETIC) 20/25 mg   Oral DAILY  metoprolol tartrate (LOPRESSOR) tablet 12.5 mg  12.5 mg Oral Q12H  
 heparin (porcine) injection 5,000 Units  5,000 Units SubCUTAneous Q12H  cephALEXin (KEFLEX) capsule 500 mg  500 mg Oral Q12H  hydrALAZINE (APRESOLINE) 20 mg/mL injection 10 mg  10 mg IntraVENous Q6H PRN  
 glucose chewable tablet 16 g  4 Tab Oral PRN  
 dextrose (D50W) injection syrg 12.5-25 g  25-50 mL IntraVENous PRN  
  glucagon (GLUCAGEN) injection 1 mg  1 mg IntraMUSCular PRN  
 insulin lispro (HUMALOG) injection   SubCUTAneous AC&HS  clopidogrel (PLAVIX) tablet 75 mg  75 mg Oral DAILY  sodium chloride (NS) flush 5-40 mL  5-40 mL IntraVENous Q8H  
 sodium chloride (NS) flush 5-40 mL  5-40 mL IntraVENous PRN  
 
______________________________________________________________________ EXPECTED LENGTH OF STAY: 3d 7h 
ACTUAL LENGTH OF STAY:          3 Felipe Delacruz MD  
Patient has given Verbal permission to discuss medical care with  
persons present in the room and and also with contact as listed on face sheet.

## 2019-01-30 NOTE — PROGRESS NOTES
TRANSFER - OUT REPORT: 
 
Verbal report given to [place] on Esperanza Pedroza  being transferred to [address] [place] [address] for routine progression of care Report consisted of patients Situation, Background, Assessment and  
Recommendations(SBAR). Information from the following report(s) SBAR, Kardex, MAR, Recent Results and Cardiac Rhythm NSR was reviewed with the receiving nurse. Lines:  
Peripheral IV 01/29/19 Right Arm (Active) Site Assessment Clean, dry, & intact 1/30/2019 12:00 PM  
Phlebitis Assessment 0 1/30/2019 12:00 PM  
Infiltration Assessment 0 1/30/2019 12:00 PM  
Dressing Status Clean, dry, & intact 1/30/2019 12:00 PM  
Dressing Type Tape;Transparent 1/30/2019 12:00 PM  
Hub Color/Line Status Pink 1/30/2019 12:00 PM  
Action Taken Open ports on tubing capped 1/30/2019 12:00 PM  
Alcohol Cap Used Yes 1/30/2019 12:00 PM  
  
 
Opportunity for questions and clarification was provided. Patient transported with: 
 Registered Nurse Tech

## 2019-01-30 NOTE — PROGRESS NOTES
Problem: Self Care Deficits Care Plan (Adult) Goal: *Acute Goals and Plan of Care (Insert Text) Occupational Therapy Goals Initiated 1/30/2019 1. Patient will perform self-feeding with modified independence using right dominant hand within 7 day(s). 2.  Patient will perform upper body dressing with modified independence within 7 day(s). 3.  Patient will perform lower body dressing with minimal assistance/contact guard assist within 7 day(s). 4.  Patient will perform toilet transfers with supervision/set-up within 7 day(s). 5.  Patient will perform all aspects of toileting with supervision/set-up within 7 day(s). 6.  Patient will participate in upper extremity therapeutic exercise/activities with supervision/set-up for 10 minutes and demonstrate increased tolerance to AAROM at right shoulder within 7 day(s). Occupational Therapy EVALUATION Patient: Amanda Demarco (89 y.o. female) Date: 1/30/2019 Primary Diagnosis: Altered mental status Slurred speech Difficulty walking Precautions:   Fall ASSESSMENT : 
Based on the objective data described below, the patient presents with increased activity tolerance, decreased right dominant UE strength, AROM and coordination. Limited further by pain in right shoulder secondary to fall (negtive for fx). Patient's daughter present and assisted with translation, however patient understands simple, slow, short commands with gestures. LE ADL's with mod to max assist, toileting, bathing and UE dressing with min assist. At this time feel she would benefit from rehab to increase independence, safety, balance, functional use of dominant UE prior to return home. Patient will benefit from skilled intervention to address the above impairments. Patients rehabilitation potential is considered to be Good Factors which may influence rehabilitation potential include:  
[]                None noted []                Mental ability/status []                Medical condition [x]                Home/family situation and support systems []                Safety awareness []                Pain tolerance/management 
[]                Other: PLAN : 
Recommendations and Planned Interventions: 
[x]                  Self Care Training                  [x]           Therapeutic Activities [x]                  Functional Mobility Training    [x]           Cognitive Retraining 
[x]                  Therapeutic Exercises           [x]           Endurance Activities [x]                  Balance Training                   [x]           Neuromuscular Re-Education []                  Visual/Perceptual Training     [x]      Home Safety Training 
[x]                  Patient Education                 [x]           Family Training/Education []                  Other (comment): Frequency/Duration: Patient will be followed by occupational therapy 5 times a week to address goals. Discharge Recommendations: Inpatient Rehab Further Equipment Recommendations for Discharge: to be determined SUBJECTIVE:  
Patient stated: asked her daughter what call bell was for after instruction OBJECTIVE DATA SUMMARY:  
HISTORY:  
Past Medical History:  
Diagnosis Date  Diabetes ([facility] [address]  Hypertension  Stroke ([facility] [address] History reviewed. No pertinent surgical history. Prior Level of Function/Environment/Context: lives with her daughter, son in law and granddaughter, home alone at times, however daughter reporting that they try and always have someone home. Patient independent mobility, hx of CVA resulting in decreased use of left UE and daughter reporting she primarily used her right UE, however left UE appears functional at this time. Independent basic ADL's to min assist for bra at times. No falls reported Occupations in which the patient is/was successful, what are the barriers preventing that success: Performance Patterns (routines, roles, habits, and rituals):  
Personal Interests and/or values:  
Expanded or extensive additional review of patient history:  
 
Home Situation Home Environment: Private residence One/Two Story Residence: Two story Living Alone: No 
Support Systems: Child(alexus), Family member(s) Patient Expects to be Discharged to[de-identified] Private residence Current DME Used/Available at Home: None Hand dominance: RightEXAMINATION OF PERFORMANCE DEFICITS: 
Cognitive/Behavioral Status: 
Neurologic State: Alert Orientation Level: Disoriented to situation;Oriented to person;Oriented to place;Oriented to time Cognition: Follows commands Perception: Appears intact Perseveration: No perseveration noted Safety/Judgement: Decreased awareness of need for assistance;Decreased insight into deficits Skin: intact Edema: none noted Hearing: Auditory Auditory Impairment: None Vision/Perceptual:   
    
    
    
  
    
    
Corrective Lenses: Reading glasses(daughter reports need for new) Range of Motion: 
AROM: Generally decreased, functional(excluding right shoulder due to pain and weakness) Strength: 
Strength: Generally decreased, functional 
  
  
  
  
Coordination: 
Coordination: Generally decreased, functional 
Fine Motor Skills-Upper: Right Impaired;Left Impaired Gross Motor Skills-Upper: Left Impaired;Right Impaired Tone & Sensation: 
Tone: Abnormal(right UE) Sensation: Intact(per patient to light touch) Balance: 
Sitting: Intact Standing: Impaired Standing - Static: Fair Standing - Dynamic : Poor Functional Mobility and Transfers for ADLs:Bed Mobility: 
Rolling: Contact guard assistance Supine to Sit: Minimum assistance Scooting: Contact guard assistance Transfers: 
Sit to Stand: Minimum assistance Functional Transfers Bathroom Mobility: Minimum assistance Toilet Transfer : Minimum assistance Bed to Chair: Minimum assistance ADL Assessment: 
Feeding: Setup;Supervision; Additional time Oral Facial Hygiene/Grooming: Supervision Bathing: Minimum assistance Upper Body Dressing: Minimum assistance Lower Body Dressing: Maximum assistance; Moderate assistance(unable to doff socks seated edge of bed) Toileting: Minimum assistance ADL Intervention and task modifications: 
  
Educated on role of OT, fall prevention, use of call bell, benefit of increased activity and use of right hand elbow to hand as tolerated, elevated right UE on pillow to increase comfort Patient's daughter educated and indicated understanding the benefits of maintaining activity tolerance, functional mobility, and independence with self care tasks during acute stay  to ensure safe return home and to baseline. Encouraged patient to increase frequency and duration OOB, be out of bed for all meals, perform daily ADLs (as approved by RN/MD regarding bathing etc), and performing functional mobility to/from bathroom. Cognitive Retraining Safety/Judgement: Decreased awareness of need for assistance;Decreased insight into deficits Functional Measure:  
Unable to complete Fugl-Moreno this date due to decreased command following and right shoulder pain from fall Barthel Index: 
 
Bathin Bladder: 10 Bowels: 10 
Groomin Dressin Feedin Mobility: 10 Stairs: 0 Toilet Use: 5 Transfer (Bed to Chair and Back): 10 Total: 60 The Barthel ADL Index: Guidelines 1. The index should be used as a record of what a patient does, not as a record of what a patient could do. 2. The main aim is to establish degree of independence from any help, physical or verbal, however minor and for whatever reason. 3. The need for supervision renders the patient not independent. 4. A patient's performance should be established using the best available evidence.  Asking the patient, friends/relatives and nurses are the usual sources, but direct observation and common sense are also important. However direct testing is not needed. 5. Usually the patient's performance over the preceding 24-48 hours is important, but occasionally longer periods will be relevant. 6. Middle categories imply that the patient supplies over 50 per cent of the effort. 7. Use of aids to be independent is allowed. Cortney Solis., Barthel, D.W. ([id number]). Functional evaluation: the Barthel Index. [address] [facility] (14)2. MARGARITO Hernandez, Tracie Arambula., Franklyn Donnelly., Lashell Kelly, [facility] (1999). Measuring the change indisability after inpatient rehabilitation; comparison of the responsiveness of the Barthel Index and Functional [place] Measure. Journal of Neurology, Neurosurgery, and Psychiatry, 66(4), 336-849. MERRILL Blum, ESA Riojas, & Mary Kay Day MDameonA. (2004.) Assessment of post-stroke quality of life in cost-effectiveness studies: The usefulness of the Barthel Index and the EuroQoL-5D. [facility], 13, 986-90 Occupational Therapy Evaluation Charge Determination History Examination Decision-Making LOW Complexity : Brief history review  MEDIUM Complexity : 3-5 performance deficits relating to physical, cognitive , or psychosocial skils that result in activity limitations and / or participation restrictions MEDIUM Complexity : Patient may present with comorbidities that affect occupational performnce. Miniml to moderate modification of tasks or assistance (eg, physical or verbal ) with assesment(s) is necessary to enable patient to complete evaluation Based on the above components, the patient evaluation is determined to be of the following complexity level: LOW Pain: 
 right shoulder due to fall Activity Tolerance:  
Good Please refer to the flowsheet for vital signs taken during this treatment. After treatment:  
[x]  Patient left in no apparent distress sitting up in chair []  Patient left in no apparent distress in bed 
[x]  Call bell left within reach [x]  Nursing notified 
[x]  Caregiver present 
[]  Bed alarm activated COMMUNICATION/EDUCATION:  
The patients plan of care was discussed with: Registered Nurse. Patient was educated regarding her deficit(s) of right UE weakness as this relates to her diagnosis of CVA. She demonstrated Guarded understanding 
 
[x]      Home safety education was provided and the patient/caregiver indicated understanding. [x]      Patient/family have participated as able and agree with findings and recommendations. []      Patient is unable to participate in plan of care at this time. This patients plan of care is appropriate for delegation to [facility]. Thank you for this referral. 
Donald Villanueva, OTR/L Time Calculation: 24 mins

## 2019-01-30 NOTE — PROGRESS NOTES
Problem: Mobility Impaired (Adult and Pediatric) Goal: *Acute Goals and Plan of Care (Insert Text) Physical Therapy Goals Initiated 1/29/2019 1. Patient will move from supine to sit and sit to supine  in bed with independence within 7 day(s). 2.  Patient will transfer from bed to chair and chair to bed with supervision/set-up using the least restrictive device within 7 day(s). 3.  Patient will perform sit to stand with supervision/set-up within 7 day(s). 4.  Patient will ambulate with supervision/set-up for 300 feet with the least restrictive device within 7 day(s). 5.  Patient will ascend/descend 3 stairs with 1 handrail(s) with minimal assistance/contact guard assist within 7 day(s). physical Therapy TREATMENT Patient: Marvelyn Holstein (77 y.o. female) Date: 1/30/2019 Diagnosis: Altered mental status Slurred speech Difficulty walking Altered mental status Precautions: Fall Chart, physical therapy assessment, plan of care and goals were reviewed. ASSESSMENT: 
Pt received supine in bed and agreeable to PT intervention. Pt cleared by nursing for mobility. Language barrier continues to be significant limitation in patient's ability to progress in acute PT at this time, however pt is motivated to mobilize. VSS at rest on RA. Needed min A x 1 for supine>sit transfers, mostly for trunk. Sitting balance intact while sitting on EOB and while sitting in chair post-gait training, however pt leaning over to R onto RUE while sitting in bedside chair. Sit<>stand transfers performed with min A x 1. She ambulated 300' with min/mod A x 1 using HHA x 1 on L, however exhibited very unsteady gait overall. Demonstrates path deviations, narrowed BETO, decreased step clearance and step length SHERINE, and inconsistent arm swing on R throughout ambulation. No overt LOB noted, however pt listing to R nearly constantly. Pt took two brief standing rest breaks during gait due to fatigue.  Pt was assisted into bedside chair and left with all needs met, RN aware, and chair alarm on following therapy session. Recommend pt discharge to rehab vs home with HHPT and 24/7 physical assistance x 1 pending progress in acute PT and family's ability to provide 24/7 assistance. If family unable to provide 24/7 assistance, she will require rehab as patient is a HIGH fall risk and with decline from baseline mobility. Appropriate AD still being evaluated as patient is very unsafe with RW. Recommend trial with SPC during gait training during next therapy session as appropriate. Progression toward goals: 
[]    Improving appropriately and progressing toward goals [x]    Improving slowly and progressing toward goals 
[]    Not making progress toward goals and plan of care will be adjusted PLAN: 
Patient continues to benefit from skilled intervention to address the above impairments. Continue treatment per established plan of care. Discharge Recommendations: Rehab vs Home with HHPT and 24/7 assistance x 1 Further Equipment Recommendations for Discharge:  TBD based on progress in acute PT; SPC? -- Pt very unsafe with RW  
 
SUBJECTIVE:  
Patient stated Close the door.  OBJECTIVE DATA SUMMARY:  
Critical Behavior: 
Neurologic State: Alert Orientation Level: Disoriented to place, Disoriented to situation, Oriented to person Cognition: Decreased attention/concentration, Follows commands, Poor safety awareness Functional Mobility Training: 
Bed Mobility: 
Rolling: Contact guard assistance Supine to Sit: Minimum assistance Scooting: Contact guard assistance Transfers: 
Sit to Stand: Minimum assistance Stand to Sit: Minimum assistance Balance: 
Sitting: Intact Standing: Impaired Standing - Static: Fair Standing - Dynamic : PoorAmbulation/Gait Training: 
Distance (ft): 300 Feet (ft) Assistive Device: Gait belt(HHA x 1 on L) Ambulation - Level of Assistance: Minimal assistance; Moderate assistance;Assist x1;Additional time Gait Abnormalities: Decreased step clearance;Shuffling gait; Path deviations; Altered arm swing Base of Support: Narrowed Speed/Neris: Shuffled;Pace decreased (<100 feet/min); Slow Step Length: Left shortened;Right shortened Pain: 
Pain Scale 1: Numeric (0 - 10) Pain Intensity 1: 0 Activity Tolerance:  
Fair - increased fatigue with activity; HR and SaO2 stable; BP mildly elevated post-activity; c/o LUE pain with touch and movement Please refer to the flowsheet for vital signs taken during this treatment. After treatment:  
[x]    Patient left in no apparent distress sitting up in chair 
[]    Patient left in no apparent distress in bed 
[x]    Call bell left within reach [x]    Nursing notified 
[]    Caregiver present [x]    Bed alarm activated COMMUNICATION/COLLABORATION:  
The patients plan of care was discussed with: Physical Therapist and Registered Nurse Dima Prim, PT, DPT Time Calculation: 15 mins

## 2019-01-30 NOTE — PROGRESS NOTES
CM called and spoke with Henrirussell Urbano ([phone]) with  [facility] stated she plans to meet with the patient's daughter Laney Gravely today to go over the [employer]. [facility] stated they are still reviewing case, have not yet accepted- will need OT note. CM requested OT note during IDR rounds, will need OT orders. CM notified MD. Copy of [facility] Card is on the patient's chart for acute rehab provider and [facility] (CM also faxed to [facility]).  Denver Matte, MSW

## 2019-01-30 NOTE — ROUTINE PROCESS
Bedside and Verbal shift change report given to Brion Hashimoto, RN  (oncoming nurse) by Livan Ledezma RN (offgoing nurse). Report included the following information SBAR, Kardex, ED Summary, Procedure Summary, Intake/Output, MAR, Recent Results, Med Rec Status, Cardiac Rhythm NSR.  and Alarm Parameters .

## 2019-01-30 NOTE — PROGRESS NOTES
Problem: Pressure Injury - Risk of 
Goal: *Prevention of pressure injury Document Terry Scale and appropriate interventions in the flowsheet. Outcome: Progressing Towards Goal 
Pressure Injury Interventions: Activity Interventions: Pressure redistribution bed/mattress(bed type), Increase time out of bed, PT/OT evaluation Mobility Interventions: HOB 30 degrees or less, PT/OT evaluation, Pressure redistribution bed/mattress (bed type) Nutrition Interventions: Document food/fluid/supplement intake

## 2019-01-30 NOTE — PROGRESS NOTES
Patient resting in bed at this time. Language line used to assist with assessment. Patient denies any pain at this time.

## 2019-01-30 NOTE — PROGRESS NOTES
Problem: Falls - Risk of 
Goal: *Absence of Falls Document [place] Shows Fall Risk and appropriate interventions in the flowsheet. Outcome: Progressing Towards Goal 
Fall Risk Interventions: 
Mobility Interventions: OT consult for ADLs, Bed/chair exit alarm, Communicate number of staff needed for ambulation/transfer, Patient to call before getting OOB, Utilize gait belt for transfers/ambulation Mentation Interventions: Bed/chair exit alarm, Door open when patient unattended, Gait belt with transfers/ambulation, Increase mobility, Room close to nurse's station, Toileting rounds Medication Interventions: Bed/chair exit alarm, Patient to call before getting OOB, Utilize gait belt for transfers/ambulation, Teach patient to arise slowly Elimination Interventions: Bed/chair exit alarm, Call light in reach, Patient to call for help with toileting needs, Toileting schedule/hourly rounds History of Falls Interventions: Bed/chair exit alarm, Door open when patient unattended, Room close to nurse's station, Consult care management for discharge planning

## 2019-01-31 LAB
GLUCOSE BLD STRIP.AUTO-MCNC: 131 MG/DL (ref 65–100)
GLUCOSE BLD STRIP.AUTO-MCNC: 148 MG/DL (ref 65–100)
GLUCOSE BLD STRIP.AUTO-MCNC: 173 MG/DL (ref 65–100)
GLUCOSE BLD STRIP.AUTO-MCNC: 184 MG/DL (ref 65–100)
SERVICE CMNT-IMP: ABNORMAL

## 2019-01-31 PROCEDURE — 74011250636 HC RX REV CODE- 250/636: Performed by: INTERNAL MEDICINE

## 2019-01-31 PROCEDURE — 74011250637 HC RX REV CODE- 250/637: Performed by: PSYCHIATRY & NEUROLOGY

## 2019-01-31 PROCEDURE — 74011250637 HC RX REV CODE- 250/637: Performed by: INTERNAL MEDICINE

## 2019-01-31 PROCEDURE — 74011250637 HC RX REV CODE- 250/637: Performed by: FAMILY MEDICINE

## 2019-01-31 PROCEDURE — 74011636637 HC RX REV CODE- 636/637: Performed by: INTERNAL MEDICINE

## 2019-01-31 PROCEDURE — 97535 SELF CARE MNGMENT TRAINING: CPT

## 2019-01-31 PROCEDURE — 97116 GAIT TRAINING THERAPY: CPT

## 2019-01-31 PROCEDURE — 82962 GLUCOSE BLOOD TEST: CPT

## 2019-01-31 PROCEDURE — 65270000032 HC RM SEMIPRIVATE

## 2019-01-31 RX ORDER — METOPROLOL TARTRATE 50 MG/1
50 TABLET ORAL EVERY 12 HOURS
Status: DISCONTINUED | OUTPATIENT
Start: 2019-01-31 | End: 2019-02-01 | Stop reason: HOSPADM

## 2019-01-31 RX ADMIN — HYDROCHLOROTHIAZIDE: 25 TABLET ORAL at 09:30

## 2019-01-31 RX ADMIN — CEPHALEXIN 500 MG: 500 CAPSULE ORAL at 09:30

## 2019-01-31 RX ADMIN — CEPHALEXIN 500 MG: 500 CAPSULE ORAL at 21:43

## 2019-01-31 RX ADMIN — METOPROLOL TARTRATE 50 MG: 50 TABLET ORAL at 21:43

## 2019-01-31 RX ADMIN — METOPROLOL TARTRATE 50 MG: 50 TABLET ORAL at 09:30

## 2019-01-31 RX ADMIN — ASPIRIN 81 MG CHEWABLE TABLET 81 MG: 81 TABLET CHEWABLE at 09:30

## 2019-01-31 RX ADMIN — HYDRALAZINE HYDROCHLORIDE 10 MG: 20 INJECTION INTRAMUSCULAR; INTRAVENOUS at 04:30

## 2019-01-31 RX ADMIN — HEPARIN SODIUM 5000 UNITS: 5000 INJECTION, SOLUTION INTRAVENOUS; SUBCUTANEOUS at 03:49

## 2019-01-31 RX ADMIN — HUMAN INSULIN 10 UNITS: 100 INJECTION, SUSPENSION SUBCUTANEOUS at 18:30

## 2019-01-31 RX ADMIN — INSULIN LISPRO 2 UNITS: 100 INJECTION, SOLUTION INTRAVENOUS; SUBCUTANEOUS at 12:12

## 2019-01-31 RX ADMIN — INSULIN LISPRO 2 UNITS: 100 INJECTION, SOLUTION INTRAVENOUS; SUBCUTANEOUS at 07:49

## 2019-01-31 RX ADMIN — CLOPIDOGREL BISULFATE 75 MG: 75 TABLET ORAL at 09:30

## 2019-01-31 RX ADMIN — LEVETIRACETAM 500 MG: 500 TABLET, FILM COATED ORAL at 18:30

## 2019-01-31 RX ADMIN — ATORVASTATIN CALCIUM 10 MG: 10 TABLET, FILM COATED ORAL at 09:30

## 2019-01-31 RX ADMIN — LEVETIRACETAM 250 MG: 250 TABLET ORAL at 07:49

## 2019-01-31 RX ADMIN — Medication 10 ML: at 13:40

## 2019-01-31 RX ADMIN — Medication 10 ML: at 21:43

## 2019-01-31 RX ADMIN — Medication 10 ML: at 04:30

## 2019-01-31 RX ADMIN — HEPARIN SODIUM 5000 UNITS: 5000 INJECTION, SOLUTION INTRAVENOUS; SUBCUTANEOUS at 16:37

## 2019-01-31 NOTE — PROGRESS NOTES
Problem: Pressure Injury - Risk of 
Goal: *Prevention of pressure injury Document Terry Scale and appropriate interventions in the flowsheet. Outcome: Progressing Towards Goal 
Pressure Injury Interventions: Activity Interventions: Increase time out of bed, Pressure redistribution bed/mattress(bed type) Mobility Interventions: Pressure redistribution bed/mattress (bed type) Nutrition Interventions: Document food/fluid/supplement intake

## 2019-01-31 NOTE — PROGRESS NOTES
Hospitalist Progress Note Guanako Lyn MD 
Answering service: [phone] OR [phone] from in house phone Date of Service:  2019 NAME:  Justo Parrish :  1955 MRN:  [id number] Admission Summary:  
27-year-old Ma[place] female with past medical history of type 2 diabetes mellitus, hypertension, and CVA, was brought to the emergency department from home with reported generalized weakness, slurred speech, difficulty walking. Interval history / Subjective:  
Patient is seen and examined this afternoon. No new complaint. Waiting approval for rehab. Assessment & Plan: # Acute encephalopathy probably due to recurrent CVA. Improving - Old large right MCA , has hx of CVA with some residual left-sided weakness.  
- CT head negative for any acute findings - MRI Severe focal stenosis in the proximal right P2 segment and Moderate focal stenosis in the proximal right M2 posterior division. 
- Continue Aspirin,Plavix and statin  
- neurochecks. - Neurology on board, eval noted and appreciated. 
- PT/OT and speech  
- echo EF 70% and carotid doppler patent aman ICA 
  
# Primary Hypertension.  
- on Lisinopril-hctz and metoprolol, further dose adjusted  
  
# Uncontrolled Insulin treated type 2 DM. - A1C: 10.5 
- Accucheck monitoring with sliding scale insulin # CKD stage 3-4: no acute worsening  
  
Code: Full DVT Prop: Heparin  
  
  
Plan: [facility]  
  
 
  
 
Hospital Problems  Date Reviewed: 2019 Codes Class Noted POA Slurred speech ICD-10-CM: R47.81 ICD-9-CM: 784.59  2019 Unknown * (Principal) Altered mental status ICD-10-CM: R41.82 
ICD-9-CM: 780.97  2019 Unknown Difficulty walking ICD-10-CM: R26.2 ICD-9-CM: 719.7  2019 Unknown Review of Systems:  
Difficult to obtain due to communication barrier Vital Signs: Last 24hrs VS reviewed since prior progress note. Most recent are: 
Visit Vitals /77 (BP 1 Location: Left arm, BP Patient Position: At rest) Pulse 73 Temp 98.2 °F (36.8 °C) Resp 18 Ht 5' 5\" (1.651 m) Wt 49.5 kg (109 lb 2 oz) SpO2 95% Breastfeeding? No  
BMI 18.16 kg/m² Intake/Output Summary (Last 24 hours) at 1/31/2019 1748 Last data filed at 1/31/2019 0945 Gross per 24 hour Intake 120 ml Output  Net 120 ml Physical Examination:  
 
 
     
Constitutional:  No acute distress, low mood ENT:  Oral mucous moist, oropharynx benign. Neck supple, Resp:  CTA bilaterally. No wheezing/rhonchi/rales. No accessory muscle use CV:  Regular rhythm, normal rate, no murmurs, gallops, rubs GI:  Soft, non distended, non tender. normoactive bowel sounds, no hepatosplenomegaly Musculoskeletal:  No edema, warm, 2+ pulses throughout Neurologic:  Moves all extremities. With RUE 4/5 Data Review: I Personally reviewed labs and imaging Labs:  
 
No results for input(s): WBC, HGB, HCT, PLT, HGBEXT, HCTEXT, PLTEXT, HGBEXT, HCTEXT, PLTEXT in the last 72 hours. No results for input(s): NA, K, CL, CO2, BUN, CREA, GLU, CA, MG, PHOS, URICA in the last 72 hours. No results for input(s): SGOT, GPT, ALT, AP, TBIL, TBILI, TP, ALB, GLOB, GGT, AML, LPSE in the last 72 hours. No lab exists for component: AMYP, HLPSE No results for input(s): INR, PTP, APTT in the last 72 hours. No lab exists for component: INREXT, INREXT No results for input(s): FE, TIBC, PSAT, FERR in the last 72 hours. No results found for: FOL, RBCF No results for input(s): PH, PCO2, PO2 in the last 72 hours. No results for input(s): CPK, CKNDX, TROIQ in the last 72 hours. No lab exists for component: CPKMB Lab Results Component Value Date/Time  Cholesterol, total 107 01/28/2019 06:50 AM  
 HDL Cholesterol 31 01/28/2019 06:50 AM  
 LDL,Direct 119 (H) 01/25/2013 03:10 PM  
 LDL, calculated 54.6 01/28/2019 06:50 AM  
 Triglyceride 107 01/28/2019 06:50 AM  
 CHOL/HDL Ratio 3.5 01/28/2019 06:50 AM  
 
Lab Results Component Value Date/Time Glucose (POC) 131 (H) 01/31/2019 04:35 PM  
 Glucose (POC) 184 (H) 01/31/2019 11:35 AM  
 Glucose (POC) 148 (H) 01/31/2019 07:32 AM  
 Glucose (POC) 154 (H) 01/30/2019 09:25 PM  
 Glucose (POC) 149 (H) 01/30/2019 04:54 PM  
 
Lab Results Component Value Date/Time Color YELLOW/STRAW 01/28/2019 06:53 AM  
 Appearance CLEAR 01/28/2019 06:53 AM  
 Specific gravity 1.007 01/28/2019 06:53 AM  
 pH (UA) 6.0 01/28/2019 06:53 AM  
 Protein NEGATIVE  01/28/2019 06:53 AM  
 Glucose NEGATIVE  01/28/2019 06:53 AM  
 Ketone NEGATIVE  01/28/2019 06:53 AM  
 Bilirubin NEGATIVE  01/28/2019 06:53 AM  
 Urobilinogen 0.2 01/28/2019 06:53 AM  
 Nitrites NEGATIVE  01/28/2019 06:53 AM  
 Leukocyte Esterase NEGATIVE  01/28/2019 06:53 AM  
 Epithelial cells FEW 01/28/2019 06:53 AM  
 Bacteria NEGATIVE  01/28/2019 06:53 AM  
 WBC 0-4 01/28/2019 06:53 AM  
 RBC 0-5 01/28/2019 06:53 AM  
 
 
 
Medications Reviewed:  
 
Current Facility-Administered Medications Medication Dose Route Frequency  metoprolol tartrate (LOPRESSOR) tablet 50 mg  50 mg Oral Q12H  aspirin chewable tablet 81 mg  81 mg Oral DAILY  atorvastatin (LIPITOR) tablet 10 mg  10 mg Oral DAILY  levETIRAcetam (KEPPRA) tablet 500 mg  500 mg Oral QPM  
 levETIRAcetam (KEPPRA) tablet 250 mg  250 mg Oral 7am  
 acetaminophen (TYLENOL) tablet 650 mg  650 mg Oral Q6H PRN  
 lisinopril/hydroCHLOROthiazide(PRINZIDE/ZESTORETIC) 20/25 mg   Oral DAILY  heparin (porcine) injection 5,000 Units  5,000 Units SubCUTAneous Q12H  cephALEXin (KEFLEX) capsule 500 mg  500 mg Oral Q12H  hydrALAZINE (APRESOLINE) 20 mg/mL injection 10 mg  10 mg IntraVENous Q6H PRN  
 glucose chewable tablet 16 g  4 Tab Oral PRN  
 dextrose (D50W) injection syrg 12.5-25 g  25-50 mL IntraVENous PRN  
  glucagon (GLUCAGEN) injection 1 mg  1 mg IntraMUSCular PRN  
 insulin lispro (HUMALOG) injection   SubCUTAneous AC&HS  clopidogrel (PLAVIX) tablet 75 mg  75 mg Oral DAILY  sodium chloride (NS) flush 5-40 mL  5-40 mL IntraVENous Q8H  
 sodium chloride (NS) flush 5-40 mL  5-40 mL IntraVENous PRN  
 
______________________________________________________________________ EXPECTED LENGTH OF STAY: 3d 7h 
ACTUAL LENGTH OF STAY:          4 Gerda Tate MD  
Patient has given Verbal permission to discuss medical care with  
persons present in the room and and also with contact as listed on face sheet.

## 2019-01-31 NOTE — PROGRESS NOTES
Problem: Self Care Deficits Care Plan (Adult) Goal: *Acute Goals and Plan of Care (Insert Text) Occupational Therapy Goals Initiated 1/30/2019 1. Patient will perform self-feeding with modified independence using right dominant hand within 7 day(s). 2.  Patient will perform upper body dressing with modified independence within 7 day(s). 3.  Patient will perform lower body dressing with minimal assistance/contact guard assist within 7 day(s). 4.  Patient will perform toilet transfers with supervision/set-up within 7 day(s). 5.  Patient will perform all aspects of toileting with supervision/set-up within 7 day(s). 6.  Patient will participate in upper extremity therapeutic exercise/activities with supervision/set-up for 10 minutes and demonstrate increased tolerance to AAROM at right shoulder within 7 day(s). Occupational Therapy TREATMENT Patient: Barbara Valdez (21 y.o. female) Date: 1/31/2019 Diagnosis: Altered mental status Slurred speech Difficulty walking Altered mental status Precautions: Fall(Speaks IGBO (from Katie) blue phone available) Chart, occupational therapy assessment, plan of care, and goals were reviewed. ASSESSMENT: 
Patient received in bed requiring max vc and tactile cues for participation; Initially flat refusal.  Patient mod A - to min A self care and functional mobility overall; will need family to discuss activity level of inpatient rehab to verify understanding from patient that 3 hours will be tolerated; appears that physically she would be able but possibly socially/behavior-wise 3 hours may be a challenge with less participation noted when family not present. Firm desire by patient will be needed for best outcome in inpatient rehab, not just desire by family. Progression toward goals: 
[x]       Improving appropriately and progressing toward goals 
[]       Improving slowly and progressing toward goals []       Not making progress toward goals and plan of care will be adjusted PLAN: 
Patient continues to benefit from skilled intervention to address the above impairments. Continue treatment per established plan of care. Discharge Recommendations:  Inpatient Rehab vs SNF Vs [facility] Further Equipment Recommendations for Discharge:  TBA SUBJECTIVE:  
Patient stated no.  OBJECTIVE DATA SUMMARY:  
Cognitive/Behavioral Status: 
Neurologic State: Alert Orientation Level: Unable to verbalize(vs unwilling to verbalize) Cognition: Decreased command following Perseveration: (insistent on no participation) Safety/Judgement: Decreased insight into deficits Functional Mobility and Transfers for ADLs:Bed Mobility: 
Rolling: Supervision; Total assistance(Nsg reports CGA to S; refused all this session) initially then spontaneously sat up for lunch; able to feed herself R hand Transfers: 
  
 mod A Balance: 
Sitting: (flat refused to sit up edge of bed) initially, then able to sit with S once edge of bed ADL Intervention: 
Feeding Feeding Assistance: Supervision/set-up(refused to eat or be fed for lunch) Cues: (nsg says she ate breakfast herself sitting EOB) Grooming Grooming Assistance: Total assistance(dependent) Washing Face: Total assistance (dependent) Washing Hands: Total assistance (dependent) Cognitive Retraining Attention to Task: Single task Maintains Attention For (Time): (less than 30 seconds) Following Commands: (followed no commands; nodded head no) Safety/Judgement: Decreased insight into deficits Pain: 
Pain Scale 1: Numeric (0 - 10) Pain Intensity 1: 0 Activity Tolerance:  
Variable; improving Please refer to the flowsheet for vital signs taken during this treatment. After treatment:  
[] Patient left in no apparent distress sitting up in chair 
[x] Patient left in no apparent distress in bed 
[x] Call bell left within reach [x] Nursing notified 
[] Caregiver present 
[] Bed alarm activated COMMUNICATION/COLLABORATION:  
The patients plan of care was discussed with: Registered Nurse and Certified Nursing Assistant/Patient Care Technician Caresse Mountain OTR/L Time Calculation: 20 mins

## 2019-01-31 NOTE — PROGRESS NOTES
CM reviewed chart and noted transfer to [address] [place] [address]. Per previous CM note, [facility] is following for inpatient rehab. [facility] met with pt and family yesterday to provide financial assistance paperwork, once family completes it and returns it to [facility] liaison, [facility] can move forward with process. CM will continue to follow. SUZANNE Baxter, MARKY Family completed and turned in the financial paperwork needed for [facility].   [facility] will submit for approval.   
SUZANNE Baxter, MARKY

## 2019-01-31 NOTE — PROGRESS NOTES
Bedside shift change report given to Ana Maria Hope (oncoming nurse) by Shobha Cordero RN (offgoing nurse). Report included the following information SBAR and Kardex.

## 2019-01-31 NOTE — PROGRESS NOTES
Problem: Pressure Injury - Risk of 
Goal: *Prevention of pressure injury Document Terry Scale and appropriate interventions in the flowsheet. Outcome: Progressing Towards Goal 
Pressure Injury Interventions: 
  
 
Moisture Interventions: Absorbent underpads, Limit adult briefs, Minimize layers Activity Interventions: Increase time out of bed, Pressure redistribution bed/mattress(bed type), PT/OT evaluation Mobility Interventions: HOB 30 degrees or less, Pressure redistribution bed/mattress (bed type), PT/OT evaluation Nutrition Interventions: Document food/fluid/supplement intake

## 2019-01-31 NOTE — PROGRESS NOTES
Problem: Mobility Impaired (Adult and Pediatric) Goal: *Acute Goals and Plan of Care (Insert Text) Physical Therapy Goals Initiated 1/29/2019 1. Patient will move from supine to sit and sit to supine  in bed with independence within 7 day(s). 2.  Patient will transfer from bed to chair and chair to bed with supervision/set-up using the least restrictive device within 7 day(s). 3.  Patient will perform sit to stand with supervision/set-up within 7 day(s). 4.  Patient will ambulate with supervision/set-up for 300 feet with the least restrictive device within 7 day(s). 5.  Patient will ascend/descend 3 stairs with 1 handrail(s) with minimal assistance/contact guard assist within 7 day(s). physical Therapy TREATMENT Patient: Marai Del Rosario Tamez (73 y.o. female) Date: 1/31/2019 Diagnosis: Altered mental status Slurred speech Difficulty walking Altered mental status Precautions: Fall(Speaks IGBO (from Katie) blue phone available) Chart, physical therapy assessment, plan of care and goals were reviewed. ASSESSMENT: 
Pt received supine in bed and agreeable to therapy. Pt pleasant and able to follow commands in English if spoken slowly and directly. Pt tolerated session well and making progress towards goals. Pt required CGA and additional time to assume sitting EOB. Pt able to don socks while seated EOB with a single verbal cue. Pt stood with CGA impulsively; before allowing the therapist to place gait belt. Pt ambulated with min-mod A at times for LOB. Pt veers to the R and will occasionally step across midline and reach out for railing in the hallway for support. Pt remained seated in chair with bed alarm set and RN notified. Pt remains at high risk for falls and functioning below baseline status. Pt will greatly benefit from inpatient rehab upon discharge to continue therapy efforts. Progression toward goals: 
[x]    Improving appropriately and progressing toward goals [x]    Improving slowly and progressing toward goals 
[]    Not making progress toward goals and plan of care will be adjusted PLAN: 
Patient continues to benefit from skilled intervention to address the above impairments. Continue treatment per established plan of care. Discharge Recommendations:  Inpatient Rehab Further Equipment Recommendations for Discharge:  tbd SUBJECTIVE:  
Patient stated Just leave me here.  OBJECTIVE DATA SUMMARY:  
Critical Behavior: 
Neurologic State: Alert Orientation Level: Unable to verbalize(vs unwilling to verbalize) Cognition: Decreased command following Safety/Judgement: Decreased insight into deficits Functional Mobility Training: 
Bed Mobility: 
Rolling: Contact guard assistance Transfers: 
Sit to Stand: Contact guard assistance Stand to Sit: Minimum assistance Balance: 
Sitting: Intact Standing: Impaired Standing - Static: Good Standing - Dynamic : FairAmbulation/Gait Training: 
Distance (ft): 200 Feet (ft) Assistive Device: Gait belt Ambulation - Level of Assistance: Minimal assistance(mod A for LOB) Gait Abnormalities: Decreased step clearance;Trunk sway increased; Path deviations Base of Support: Center of gravity altered;Narrowed Speed/Neris: Pace decreased (<100 feet/min) Step Length: Right shortened;Left shortened Pain: 
Pain Scale 1: Numeric (0 - 10) Pain Intensity 1: 0 Activity Tolerance:  
Good. vss Please refer to the flowsheet for vital signs taken during this treatment. After treatment:  
[x]    Patient left in no apparent distress sitting up in chair 
[]    Patient left in no apparent distress in bed 
[x]    Call bell left within reach [x]    Nursing notified 
[]    Caregiver present [x]    Bed alarm activated COMMUNICATION/COLLABORATION:  
The patients plan of care was discussed with: Speech Therapist, Registered Nurse and Rehabilitation Attendant Peyton Agosto PT, DPT 
 Time Calculation: 15 mins

## 2019-01-31 NOTE — PROGRESS NOTES
Problem: Falls - Risk of 
Goal: *Absence of Falls Document Erik Velázquez Fall Risk and appropriate interventions in the flowsheet. Outcome: Progressing Towards Goal 
Fall Risk Interventions: 
Mobility Interventions: Bed/chair exit alarm, Patient to call before getting OOB Mentation Interventions: Adequate sleep, hydration, pain control, Bed/chair exit alarm Medication Interventions: Bed/chair exit alarm, Patient to call before getting OOB, Teach patient to arise slowly Elimination Interventions: Bed/chair exit alarm, Call light in reach, Patient to call for help with toileting needs History of Falls Interventions: Bed/chair exit alarm, Door open when patient unattended, Room close to nurse's station, Consult care management for discharge planning

## 2019-01-31 NOTE — PROGRESS NOTES
Came to see patient, had planned to meet with daughter for in person translation around this time today but she appears to have been held up. RN has my contact info to contact me if family becomes available. Due to nature of dysarthria tx, in person translation is more ideal but can try with blue phone tomorrow if needed. Will continue to follow. Yannick Moreno M.S., CCC-SLP Speech-Language Pathologist

## 2019-02-01 ENCOUNTER — HOSPITAL ENCOUNTER (OUTPATIENT)
Dept: REHABILITATION | Age: 64
End: 2019-02-20
Attending: PHYSICAL MEDICINE & REHABILITATION | Admitting: PHYSICAL MEDICINE & REHABILITATION

## 2019-02-01 VITALS
WEIGHT: 109.13 LBS | HEIGHT: 65 IN | RESPIRATION RATE: 16 BRPM | DIASTOLIC BLOOD PRESSURE: 81 MMHG | BODY MASS INDEX: 18.18 KG/M2 | TEMPERATURE: 97.6 F | SYSTOLIC BLOOD PRESSURE: 152 MMHG | HEART RATE: 74 BPM | OXYGEN SATURATION: 96 %

## 2019-02-01 DIAGNOSIS — I63.512 CEREBRAL INFARCTION DUE TO UNSPECIFIED OCCLUSION OR STENOSIS OF LEFT MIDDLE CEREBRAL ARTERY (HCC): ICD-10-CM

## 2019-02-01 LAB
GLUCOSE BLD STRIP.AUTO-MCNC: 110 MG/DL (ref 65–100)
GLUCOSE BLD STRIP.AUTO-MCNC: 239 MG/DL (ref 65–100)
SERVICE CMNT-IMP: ABNORMAL
SERVICE CMNT-IMP: ABNORMAL

## 2019-02-01 PROCEDURE — 97530 THERAPEUTIC ACTIVITIES: CPT

## 2019-02-01 PROCEDURE — 82962 GLUCOSE BLOOD TEST: CPT

## 2019-02-01 PROCEDURE — 74011250637 HC RX REV CODE- 250/637: Performed by: FAMILY MEDICINE

## 2019-02-01 PROCEDURE — 74011250636 HC RX REV CODE- 250/636: Performed by: INTERNAL MEDICINE

## 2019-02-01 PROCEDURE — 97116 GAIT TRAINING THERAPY: CPT

## 2019-02-01 PROCEDURE — 74011636637 HC RX REV CODE- 636/637: Performed by: INTERNAL MEDICINE

## 2019-02-01 PROCEDURE — 74011250637 HC RX REV CODE- 250/637: Performed by: PSYCHIATRY & NEUROLOGY

## 2019-02-01 PROCEDURE — 74011250637 HC RX REV CODE- 250/637: Performed by: INTERNAL MEDICINE

## 2019-02-01 RX ORDER — CLOPIDOGREL BISULFATE 75 MG/1
75 TABLET ORAL DAILY
Qty: 30 TAB | Refills: 2 | Status: SHIPPED | OUTPATIENT
Start: 2019-02-02 | End: 2019-02-25 | Stop reason: SDUPTHER

## 2019-02-01 RX ORDER — CEPHALEXIN 500 MG/1
500 CAPSULE ORAL EVERY 12 HOURS
Qty: 4 CAP | Refills: 0 | Status: SHIPPED | OUTPATIENT
Start: 2019-02-01 | End: 2019-02-03

## 2019-02-01 RX ORDER — METOPROLOL TARTRATE 50 MG/1
50 TABLET ORAL 2 TIMES DAILY
Qty: 30 TAB | Refills: 1 | Status: SHIPPED | OUTPATIENT
Start: 2019-02-01 | End: 2019-10-28

## 2019-02-01 RX ORDER — LEVETIRACETAM 250 MG/1
TABLET ORAL
Qty: 90 TAB | Refills: 3 | Status: SHIPPED | OUTPATIENT
Start: 2019-02-01 | End: 2019-06-19

## 2019-02-01 RX ORDER — ATORVASTATIN CALCIUM 10 MG/1
10 TABLET, FILM COATED ORAL DAILY
Qty: 30 TAB | Refills: 2 | Status: SHIPPED | OUTPATIENT
Start: 2019-02-02 | End: 2019-10-28

## 2019-02-01 RX ADMIN — INSULIN LISPRO 3 UNITS: 100 INJECTION, SOLUTION INTRAVENOUS; SUBCUTANEOUS at 11:42

## 2019-02-01 RX ADMIN — ASPIRIN 81 MG CHEWABLE TABLET 81 MG: 81 TABLET CHEWABLE at 09:28

## 2019-02-01 RX ADMIN — HEPARIN SODIUM 5000 UNITS: 5000 INJECTION, SOLUTION INTRAVENOUS; SUBCUTANEOUS at 02:09

## 2019-02-01 RX ADMIN — HYDROCHLOROTHIAZIDE: 25 TABLET ORAL at 09:28

## 2019-02-01 RX ADMIN — Medication 10 ML: at 06:06

## 2019-02-01 RX ADMIN — HUMAN INSULIN 10 UNITS: 100 INJECTION, SUSPENSION SUBCUTANEOUS at 09:29

## 2019-02-01 RX ADMIN — ATORVASTATIN CALCIUM 10 MG: 10 TABLET, FILM COATED ORAL at 09:28

## 2019-02-01 RX ADMIN — ACETAMINOPHEN 650 MG: 325 TABLET ORAL at 02:10

## 2019-02-01 RX ADMIN — METOPROLOL TARTRATE 50 MG: 50 TABLET ORAL at 09:29

## 2019-02-01 RX ADMIN — CEPHALEXIN 500 MG: 500 CAPSULE ORAL at 09:29

## 2019-02-01 RX ADMIN — CLOPIDOGREL BISULFATE 75 MG: 75 TABLET ORAL at 09:28

## 2019-02-01 RX ADMIN — LEVETIRACETAM 250 MG: 250 TABLET ORAL at 07:15

## 2019-02-01 NOTE — PROGRESS NOTES
[facility] bed available today at [facility] and has approved pt for their financial assistance. [facility] asked that pt come over after 3:00pm so that they can get her bed ready. Pt's daughter plans to provide transportation. Envelope containing MARs, Kardex, AVS, and EMtalas will be sent with pt. Nurse will call report.    
SUZANNE Robert, MARKY

## 2019-02-01 NOTE — PROGRESS NOTES
Problem: Mobility Impaired (Adult and Pediatric) Goal: *Acute Goals and Plan of Care (Insert Text) Physical Therapy Goals Initiated 1/29/2019 1. Patient will move from supine to sit and sit to supine  in bed with independence within 7 day(s). 2.  Patient will transfer from bed to chair and chair to bed with supervision/set-up using the least restrictive device within 7 day(s). 3.  Patient will perform sit to stand with supervision/set-up within 7 day(s). 4.  Patient will ambulate with supervision/set-up for 300 feet with the least restrictive device within 7 day(s). 5.  Patient will ascend/descend 3 stairs with 1 handrail(s) with minimal assistance/contact guard assist within 7 day(s). physical Therapy TREATMENT Patient: Justo Parrish (89 y.o. female) Date: 2/1/2019 Diagnosis: Altered mental status Slurred speech Difficulty walking Altered mental status Precautions: Fall(Speaks IGBO (from Katie) blue phone available) Chart, physical therapy assessment, plan of care and goals were reviewed. ASSESSMENT: Patient able to follow simple one steps commands when not using blue phone. Able to move supine to sit with supervision and additional time. She was able to stand with CGA. Patient able to ambulate 200 feet with fluctuating speed, path deviations requiring min assist for balance at times to prevent falls. Patient reaching for furniture and railing at times to steady self. She remains at high risk for falls and below baseline for mobility. Patient would benefit from rehab before returning home. If she returns home she will need 24/7 assist from someone capable of giving her the physical assist she needs for safety and HHPT. Progression toward goals: 
[]    Improving appropriately and progressing toward goals [x]    Improving slowly and progressing toward goals 
[]    Not making progress toward goals and plan of care will be adjusted PLAN: 
 Patient continues to benefit from skilled intervention to address the above impairments. Continue treatment per established plan of care. Discharge Recommendations:  Home Health and Inpatient Rehab Further Equipment Recommendations for Discharge: Will continue to assess SUBJECTIVE:  
Patient stated .Not verbalizing but shakes head yes to walking OBJECTIVE DATA SUMMARY:  
Critical Behavior: 
Neurologic State: Alert Orientation Level: Oriented X4 Cognition: Follows commands Safety/Judgement: Decreased insight into deficits Functional Mobility Training: 
Bed Mobility: 
 Supine to Sit: Supervision, additional time Scooting: Supervision Transfers: 
Sit to Stand: Contact guard assistance Stand to Sit: Contact guard assistance Balance: 
Sitting: Intact; Without support Standing: Impaired; With support Standing - Static: Fair Standing - Dynamic : FairAmbulation/Gait Training: 
Distance (ft): 200 Feet (ft) Assistive Device: Gait belt; Other (comment)(HHA) Ambulation - Level of Assistance: Minimal assistance Gait Abnormalities: Decreased step clearance; Path deviations Base of Support: Narrowed Speed/Neris: Fluctuations Therapeutic Exercises: Ankle pumps and LAQ x 10 reps Pain: 
Pain Scale 1: Numeric (0 - 10) Pain Intensity 1: 5 Pain Location 1: Shoulder Pain Orientation 1: Right Pain Description 1: Aching;Constant Pain Intervention(s) 1:repositioned Activity Tolerance:  
 
Please refer to the flowsheet for vital signs taken during this treatment. After treatment:  
[x]    Patient left in no apparent distress sitting up in chair 
[]    Patient left in no apparent distress in bed 
[x]    Call bell left within reach [x]    Nursing notified 
[]    Caregiver present [x]    Bed alarm activated COMMUNICATION/COLLABORATION:  
The patients plan of care was discussed with: Registered Nurse Chana Mehta PT Time Calculation: 24 mins

## 2019-02-01 NOTE — PROGRESS NOTES
Bedside shift change report given to Erick Robles RN (oncoming nurse) by Ro Murry (offgoing nurse). Report included the following information SBAR, Kardex and MAR.

## 2019-02-01 NOTE — DISCHARGE SUMMARY
Discharge Summary PATIENT ID: Amanda Demarco MRN: [id number] YOB: 1955 DATE OF ADMISSION: 1/27/2019  7:22 PM   
DATE OF DISCHARGE: 02/01/19 PRIMARY CARE PROVIDER: Marcus Davis MD  
 
 
DISCHARGING PROVIDER: Jaimie Guerrero MD   
To contact this individual call [phone] and ask the  to page. If unavailable ask to be transferred the Adult Hospitalist Department. CONSULTATIONS: IP CONSULT TO NEUROLOGY PROCEDURES/SURGERIES: * No surgery found * IMAGING Xr Pelv Ap Only Result Date: 1/27/2019 IMPRESSION: No acute findings. Xr Humerus Rt Result Date: 1/27/2019 IMPRESSION: No acute abnormality. Mra Brain Wo Cont Result Date: 1/29/2019 IMPRESSION:  1. No evidence of large vessel occlusion. 2. Severe focal stenosis in the proximal right P2 segment. 3. Moderate focal stenosis in the proximal right M2 posterior division. Mri Brain Wo Cont Result Date: 1/29/2019 IMPRESSION: 1. Small acute infarct in the posterior limb of the left internal capsule. 2. Generalized parenchymal volume loss with mild chronic microvascular ischemic disease. Large chronic infarct in the right parieto-occipital lobe. 3. Circumferential mucosal thickening in the left maxillary sinus with frothy secretions. Correlate clinically for acute sinusitis. 23X Ct Head Wo Cont Result Date: 1/27/2019 IMPRESSION: Chronic right posterior MCA territory infarct. Nonspecific chronic white matter changes most commonly seen with chronic small vessel ischemic and/or senescent change. No acute infarct or other acute findings. [address] COURSE: # Acute encephalopathy probably due to recurrent CVA. Improving - Old large right MCA , has hx of CVA with some residual left-sided weakness.  
- CT head negative for any acute findings - MRI Severe focal stenosis in the proximal right P2 segment and Moderate focal stenosis in the proximal right M2 posterior division. 
- Continue Aspirin,Plavix and statin  
- neurochecks. - Neurology on board, nicole noted and appreciated. 
- PT/OT and speech working during her stay in the hospital  
- echo EF 70% and carotid doppler patent aman ICA 
  
# Primary Hypertension.  
- on Lisinopril-hctz and metoprolol, further dose adjusted  
  
# Uncontrolled Insulin treated type 2 DM. - A1C: 10.5 
- Accucheck monitoring with sliding scale insulin 
  
# CKD stage 3-4: no acute worsening DISCHARGE DIAGNOSES / PLAN:   
# Acute encephalopathy probably due to recurrent CVA w/Severe focal stenosis in the proximal right P2 segment and Moderate focal stenosis in the proximal right M2 posterior division. # Primary Hypertension.   
# Uncontrolled Insulin treated type 2 DM. # CKD stage 3-4: no acute worsening Patient Active Problem List  
Diagnosis Code  Diabetes mellitus type 2, controlled ([facility] E11.9  Illiteracy Z55.0  Dyspepsia R10.13  
 Osteoarthritis of right knee M17.11  
 Essential hypertension with goal blood pressure less than 140/90 I10  Seizure ([address] R56.9  Type 2 diabetes with nephropathy (HCC) E11.21  
 Slurred speech R47.81  
 Altered mental status R41.82  Difficulty walking R26.2 PENDING TEST RESULTS:  
At the time of discharge the following test results are still pending: None FOLLOW UP APPOINTMENTS:   
Follow-up Information Follow up With Specialties Details Why Contact Info Royce Haque MD Family Practice Schedule an appointment as soon as possible for a visit in 1 week  [address] [place] [id number] [phone] ADDITIONAL CARE RECOMMENDATIONS: None DIET: heart and cardiac diet ACTIVITY: as tolerated WOUND CARE: None EQUIPMENT needed: None DISCHARGE MEDICATIONS: 
 Current Discharge Medication List  
  
START taking these medications Details  
clopidogrel (PLAVIX) 75 mg tab Take 1 Tab by mouth daily. Qty: 30 Tab, Refills: 2  
  
atorvastatin (LIPITOR) 10 mg tablet Take 1 Tab by mouth daily. Qty: 30 Tab, Refills: 2  
  
cephALEXin (KEFLEX) 500 mg capsule Take 1 Cap by mouth every twelve (12) hours for 2 days. Qty: 4 Cap, Refills: 0 CONTINUE these medications which have CHANGED Details  
levETIRAcetam (KEPPRA) 250 mg tablet Take 1 Tab by mouth every morning AND 2 Tabs every evening. Qty: 90 Tab, Refills: 3  
  
metoprolol tartrate (LOPRESSOR) 50 mg tablet Take 1 Tab by mouth two (2) times a day. Qty: 30 Tab, Refills: 1 Associated Diagnoses: Essential hypertension with goal blood pressure less than 140/90 CONTINUE these medications which have NOT CHANGED Details  
metFORMIN ER (GLUCOPHAGE XR) 500 mg tablet TAKE ONE TABLET BY MOUTH IN THE MORNING AND TWO AT NIGHT FOR ONE WEEK AND THEN TWO TABLETS IN THE MORNING AND TWO TABLETS AT NIGHT Qty: 120 Tab, Refills: 5 Comments: Please consider 90 day supplies to promote better adherence Associated Diagnoses: Type 2 diabetes mellitus without complication, with long-term current use of insulin ([address] omeprazole (PRILOSEC) 20 mg capsule Take 1 Cap by mouth daily as needed. For stomach pain. Qty: 30 Cap, Refills: 5 Associated Diagnoses: Dyspepsia  
  
lisinopril-hydroCHLOROthiazide (PRINZIDE, ZESTORETIC) 20-25 mg per tablet Take 2 Tabs by mouth daily. Qty: 60 Tab, Refills: 5 Associated Diagnoses: Essential hypertension with goal blood pressure less than 140/90; Type 2 diabetes with nephropathy (HCC)  
  
insulin NPH/insulin regular (NOVOLIN 70/30, HUMULIN 70/30) 100 unit/mL (70-30) injection 25 Units by SubCUTAneous route Before breakfast and dinner. Take 25 units with breakfast and 25 units with dinner 
Qty: 15 mL, Refills: 5 Comments: Relion brand please Associated Diagnoses: Type 2 diabetes with nephropathy ([facility] aspirin delayed-release 81 mg tablet Take 1 Tab by mouth daily. Qty: 30 Tab, Refills: 1  
  
zolpidem (AMBIEN) 5 mg tablet TAKE ONE TABLET BY MOUTH NIGHTLY AS NEEDED FOR SLEEP**MAX DAILY AMOUNT: 5 MG** Qty: 30 Tab, Refills: 0 Associated Diagnoses: Insomnia, unspecified type All Micro Results Procedure Component Value Units Date/Time CULTURE, URINE [[id number]] Collected:  01/27/19 2019 Order Status:  Completed Specimen:  Urine Updated:  01/29/19 9501 Special Requests: --     
  NO SPECIAL REQUESTS Reflexed from [id number] [place] Count --     
  [address] COLONIES/mL Culture result:    
  MIXED UROGENITAL TOBIAS ISOLATED Recent Results (from the past 24 hour(s)) GLUCOSE, POC Collection Time: 01/31/19 11:35 AM  
Result Value Ref Range Glucose (POC) 184 (H) 65 - 100 mg/dL Performed by [employer] GLUCOSE, POC Collection Time: 01/31/19  4:35 PM  
Result Value Ref Range Glucose (POC) 131 (H) 65 - 100 mg/dL Performed by Imanieliud Love GLUCOSE, POC Collection Time: 01/31/19  8:49 PM  
Result Value Ref Range Glucose (POC) 173 (H) 65 - 100 mg/dL Performed by Dwight North GLUCOSE, POC Collection Time: 02/01/19  6:14 AM  
Result Value Ref Range Glucose (POC) 110 (H) 65 - 100 mg/dL Performed by Dwight North GLUCOSE, POC Collection Time: 02/01/19 11:02 AM  
Result Value Ref Range Glucose (POC) 239 (H) 65 - 100 mg/dL Performed by Imanieliud Love NOTIFY YOUR PHYSICIAN FOR ANY OF THE FOLLOWING:  
Fever over 101 degrees for 24 hours. Chest pain, shortness of breath, fever, chills, nausea, vomiting, diarrhea, change in mentation, falling, weakness, bleeding. Severe pain or pain not relieved by medications. Or, any other signs or symptoms that you may have questions about.  
 
DISPOSITION: 
  Home With: 
 OT  PT  Tarun Juarez RN  
  
 x Long term SNF/Inpatient Rehab Independent/assisted living Hospice Other:  
 
 
PATIENT CONDITION AT DISCHARGE:  
 
Functional status Poor   
x Deconditioned Independent Cognition Jackie Captee Forgetful Dementia Catheters/lines (plus indication) Khan PICC   
 PEG   
x None Code status  
x  Full code DNR   
 
PHYSICAL EXAMINATION AT DISCHARGE: 
Gen:  No distress, alert HEENT:  Normal cephalic atraumatic, extra-occular movements are intact. Neck:  Supple, No JVD Lungs:  Clear bilaterally, no wheeze, no rales, normal effort Heart:  Regular Rate and Rhythm, normal S1 and S2, no edema Abdomen:  Soft, non tender, normal bowel sounds, no guarding. Extremities:  Well perfused, no cyanosis or edema Neurological:  Awake and alert, CN's are intact, moves all extremities Skin:  No rashes or moles Mental Status:  Low mood vs ? Language barrier CHRONIC MEDICAL DIAGNOSES: 
Problem List as of 2/1/2019 Date Reviewed: 1/27/2019 Codes Class Noted - Resolved Slurred speech ICD-10-CM: R47.81 ICD-9-CM: 784.59  1/27/2019 - Present * (Principal) Altered mental status ICD-10-CM: R41.82 
ICD-9-CM: 780.97  1/27/2019 - Present Difficulty walking ICD-10-CM: R26.2 ICD-9-CM: 719.7  1/27/2019 - Present Type 2 diabetes with nephropathy ([facility] ICD-10-CM: E11.21 
ICD-9-CM: 250.40, 583.81  5/11/2018 - Present Seizure ([facility] ICD-10-CM: R56.9 ICD-9-CM: 780.39  5/6/2018 - Present Essential hypertension with goal blood pressure less than 140/90 ICD-10-CM: I10 
ICD-9-CM: 401.9  9/13/2016 - Present Osteoarthritis of right knee ICD-10-CM: M17.11 ICD-9-CM: 715.96  8/28/2014 - Present Illiteracy ICD-10-CM: Z55.0 ICD-9-CM: V62.3  2/4/2014 - Present Dyspepsia ICD-10-CM: R10.13 ICD-9-CM: 536.8  2/4/2014 - Present Diabetes mellitus type 2, controlled ([facility] ICD-10-CM: E11.9 ICD-9-CM: 250.00  2/18/2013 - Present RESOLVED: Dietary surveillance and counseling ICD-10-CM: Z71.3 ICD-9-CM: V65.3  2/18/2013 - 3/27/2013 RESOLVED: Pain in limb ICD-10-CM: M79.609 ICD-9-CM: 729.5  2/18/2013 - 3/27/2013 Discussed with patient and family. Explained the importance of following up, Compliance with medications and recommendations on discharge,Side effect profile of medications were explained. Safety precautions at home and while taking pain medications also explained. All questions answered to the satisfaction of the patient/family. Discussed with consultant(s) who are agreeable to the discharge. Verbal and written instructions on discharge given. Explained about Discharge medications and side effect profile. Advised patient/family to followup with their pcp for medication refills and preauthorization of medications, Home health orders. checkups,screenign programs as appropriate for age.  
  
 
Thank you Charley Ladd MD for taking care of your patient, Please call with any questions. Greater than 30 minutes were spent with the patient on counseling and coordination of care Signed:  
Kenny Mejia MD 
2/1/2019 
11:27 AM

## 2019-02-01 NOTE — PROGRESS NOTES
Problem: Pressure Injury - Risk of 
Goal: *Prevention of pressure injury Document Terry Scale and appropriate interventions in the flowsheet. Outcome: Resolved/Met Date Met: 02/01/19 Pressure Injury Interventions: 
  
 
Moisture Interventions: Absorbent underpads Activity Interventions: Increase time out of bed Mobility Interventions: HOB 30 degrees or less Nutrition Interventions: Document food/fluid/supplement intake Problem: Falls - Risk of 
Goal: *Absence of Falls Document Louisa Fearing Fall Risk and appropriate interventions in the flowsheet. Outcome: Resolved/Met Date Met: 02/01/19 Fall Risk Interventions: 
Mobility Interventions: Bed/chair exit alarm Mentation Interventions: Adequate sleep, hydration, pain control Medication Interventions: Bed/chair exit alarm Elimination Interventions: Bed/chair exit alarm History of Falls Interventions: Bed/chair exit alarm

## 2019-02-01 NOTE — PROGRESS NOTES
Problem: Falls - Risk of 
Goal: *Absence of Falls Document Yari Riley Fall Risk and appropriate interventions in the flowsheet. Outcome: Progressing Towards Goal 
Fall Risk Interventions: 
Mobility Interventions: Bed/chair exit alarm Mentation Interventions: Adequate sleep, hydration, pain control Medication Interventions: Bed/chair exit alarm Elimination Interventions: Bed/chair exit alarm History of Falls Interventions: Bed/chair exit alarm

## 2019-02-01 NOTE — ROUTINE PROCESS
Bedside shift change report given to Darci (oncoming nurse) by Enrrique Ball (offgoing nurse). Report included the following information SBAR, Kardex and MAR.

## 2019-02-01 NOTE — DISCHARGE INSTRUCTIONS
Patient Education        Learning About an Ischemic Stroke  What is an ischemic stroke? An ischemic (say \"lqt-ZJM-lzvi\") stroke occurs when a blood clot blocks a blood vessel in the brain. This means that blood cannot flow to some part of the brain. Without blood and the oxygen it carries, this part of the brain starts to die. The part of the body controlled by the damaged area of the brain cannot work properly. This is different from a hemorrhagic (say \"dwc-jia-FP-jick\") stroke, which happens when a blood vessel in the brain has burst open or has started to leak. The brain damage from a stroke starts within minutes. Quick treatment can help limit damage to the brain and make recovery more likely. People who have had a stroke may have a hard time talking, understanding things, and making decisions. They may have to relearn daily activities, such as how to eat, bathe, and dress. How well someone recovers from a stroke depends on how quickly the person gets to the hospital, where in the brain the stroke happened, and how severe it was. Training and therapy also make a difference in how well people recover. What are the symptoms? If you have any of these symptoms, call 911 or other emergency services right away:  · You have symptoms of a stroke. These may include:  ? Sudden numbness, tingling, weakness, or loss of movement in your face, arm, or leg, especially on only one side of your body. ? Sudden vision changes. ? Sudden trouble speaking. ? Sudden confusion or trouble understanding simple statements. ? Sudden problems with walking or balance. ? A sudden, severe headache that is different from past headaches. See your doctor if you have symptoms that seem like a stroke, even if they go away quickly. You may have had a transient ischemic attack (TIA), sometimes called a mini-stroke. A TIA is a warning that a stroke may happen soon. Getting early treatment for a TIA can help prevent a stroke.   What causes an ischemic stroke? An ischemic stroke is caused by a blood clot that blocks blood flow in the brain. The most common causes of blood clots include:  · Hardening of the arteries (atherosclerosis). This is caused by high blood pressure, diabetes, high cholesterol, or smoking. · Atrial fibrillation. How is ischemic stroke treated? Emergency treatment may be done to restore blood flow to the brain using medicine or a procedure. You may take medicines to help prevent another stroke. Ask your doctor if a stroke rehab program is right for you. How can you prevent another stroke? · Work with your doctor to treat any health problems you have. High blood pressure, high cholesterol, atrial fibrillation, and diabetes all raise your chances of having a stroke. · Be safe with medicines. Take your medicine exactly as prescribed. Call your doctor if you think you are having a problem with your medicine. · Have a healthy lifestyle. ? Do not smoke or allow others to smoke around you. If you need help quitting, talk to your doctor about stop-smoking programs and medicines. These can increase your chances of quitting for good. Smoking makes a stroke more likely. ? Limit alcohol to 2 drinks a day for men and 1 drink a day for women. ? Lose weight if you need to. A healthy weight will help you keep your heart and body healthy. ? Be active. Ask your doctor what type and level of activity is safe for you.  ? Eat heart-healthy foods, like fruits, vegetables, and high-fiber foods. Follow-up care is a key part of your treatment and safety. Be sure to make and go to all appointments, and call your doctor if you are having problems. It's also a good idea to know your test results and keep a list of the medicines you take. Where can you learn more? Go to http://monica-kaitlin.info/. Enter D161 in the search box to learn more about \"Learning About an Ischemic Stroke. \"  Current as of: September 26, 2018  Content Version: 11.9  © 0267-8854 [employer]. Care instructions adapted under license by [employer] (which disclaims liability or warranty for this information). If you have questions about a medical condition or this instruction, always ask your healthcare professional. [address] any warranty or liability for your use of this information.

## 2019-02-02 LAB
25(OH)D3 SERPL-MCNC: 25.8 NG/ML (ref 30–100)
ALBUMIN SERPL-MCNC: 3.3 G/DL (ref 3.5–5)
ALBUMIN/GLOB SERPL: 0.8 {RATIO} (ref 1.1–2.2)
ALP SERPL-CCNC: 107 U/L (ref 45–117)
ALT SERPL-CCNC: 139 U/L (ref 12–78)
ANION GAP SERPL CALC-SCNC: 9 MMOL/L (ref 5–15)
AST SERPL-CCNC: 124 U/L (ref 15–37)
BILIRUB SERPL-MCNC: 0.3 MG/DL (ref 0.2–1)
BUN SERPL-MCNC: 24 MG/DL (ref 6–20)
BUN/CREAT SERPL: 21 (ref 12–20)
CALCIUM SERPL-MCNC: 8.4 MG/DL (ref 8.5–10.1)
CHLORIDE SERPL-SCNC: 102 MMOL/L (ref 97–108)
CO2 SERPL-SCNC: 24 MMOL/L (ref 21–32)
CREAT SERPL-MCNC: 1.13 MG/DL (ref 0.55–1.02)
ERYTHROCYTE [DISTWIDTH] IN BLOOD BY AUTOMATED COUNT: 12.4 % (ref 11.5–14.5)
GLOBULIN SER CALC-MCNC: 4.3 G/DL (ref 2–4)
GLUCOSE SERPL-MCNC: 173 MG/DL (ref 65–100)
HCT VFR BLD AUTO: 41.6 % (ref 35–47)
HGB BLD-MCNC: 14.1 G/DL (ref 11.5–16)
MCH RBC QN AUTO: 28.6 PG (ref 26–34)
MCHC RBC AUTO-ENTMCNC: 33.9 G/DL (ref 30–36.5)
MCV RBC AUTO: 84.4 FL (ref 80–99)
NRBC # BLD: 0 K/UL (ref 0–0.01)
NRBC BLD-RTO: 0 PER 100 WBC
PLATELET # BLD AUTO: 568 K/UL (ref 150–400)
PMV BLD AUTO: 10.3 FL (ref 8.9–12.9)
POTASSIUM SERPL-SCNC: 4 MMOL/L (ref 3.5–5.1)
PROT SERPL-MCNC: 7.6 G/DL (ref 6.4–8.2)
RBC # BLD AUTO: 4.93 M/UL (ref 3.8–5.2)
SODIUM SERPL-SCNC: 135 MMOL/L (ref 136–145)
WBC # BLD AUTO: 8.3 K/UL (ref 3.6–11)

## 2019-02-02 PROCEDURE — 80053 COMPREHEN METABOLIC PANEL: CPT

## 2019-02-02 PROCEDURE — 80177 DRUG SCRN QUAN LEVETIRACETAM: CPT

## 2019-02-02 PROCEDURE — 36415 COLL VENOUS BLD VENIPUNCTURE: CPT

## 2019-02-02 PROCEDURE — 82306 VITAMIN D 25 HYDROXY: CPT

## 2019-02-02 PROCEDURE — 85027 COMPLETE CBC AUTOMATED: CPT

## 2019-02-03 LAB — LEVETIRACETAM SERPL-MCNC: 10.9 UG/ML (ref 10–40)

## 2019-02-05 LAB
ALBUMIN SERPL-MCNC: 3.4 G/DL (ref 3.5–5)
ALBUMIN/GLOB SERPL: 0.8 {RATIO} (ref 1.1–2.2)
ALP SERPL-CCNC: 89 U/L (ref 45–117)
ALT SERPL-CCNC: 74 U/L (ref 12–78)
ANION GAP SERPL CALC-SCNC: 9 MMOL/L (ref 5–15)
AST SERPL-CCNC: 28 U/L (ref 15–37)
BILIRUB SERPL-MCNC: 0.4 MG/DL (ref 0.2–1)
BUN SERPL-MCNC: 30 MG/DL (ref 6–20)
BUN/CREAT SERPL: 24 (ref 12–20)
CALCIUM SERPL-MCNC: 8.7 MG/DL (ref 8.5–10.1)
CHLORIDE SERPL-SCNC: 103 MMOL/L (ref 97–108)
CO2 SERPL-SCNC: 25 MMOL/L (ref 21–32)
CREAT SERPL-MCNC: 1.25 MG/DL (ref 0.55–1.02)
GLOBULIN SER CALC-MCNC: 4.3 G/DL (ref 2–4)
GLUCOSE SERPL-MCNC: 117 MG/DL (ref 65–100)
POTASSIUM SERPL-SCNC: 4.6 MMOL/L (ref 3.5–5.1)
PROT SERPL-MCNC: 7.7 G/DL (ref 6.4–8.2)
SODIUM SERPL-SCNC: 137 MMOL/L (ref 136–145)

## 2019-02-05 PROCEDURE — 36415 COLL VENOUS BLD VENIPUNCTURE: CPT

## 2019-02-05 PROCEDURE — 80053 COMPREHEN METABOLIC PANEL: CPT

## 2019-02-06 LAB
ANION GAP SERPL CALC-SCNC: 7 MMOL/L (ref 5–15)
BUN SERPL-MCNC: 38 MG/DL (ref 6–20)
BUN/CREAT SERPL: 30 (ref 12–20)
CALCIUM SERPL-MCNC: 9 MG/DL (ref 8.5–10.1)
CHLORIDE SERPL-SCNC: 102 MMOL/L (ref 97–108)
CO2 SERPL-SCNC: 26 MMOL/L (ref 21–32)
CREAT SERPL-MCNC: 1.27 MG/DL (ref 0.55–1.02)
GLUCOSE SERPL-MCNC: 115 MG/DL (ref 65–100)
POTASSIUM SERPL-SCNC: 4.4 MMOL/L (ref 3.5–5.1)
SODIUM SERPL-SCNC: 135 MMOL/L (ref 136–145)

## 2019-02-06 PROCEDURE — 80048 BASIC METABOLIC PNL TOTAL CA: CPT

## 2019-02-06 PROCEDURE — 36415 COLL VENOUS BLD VENIPUNCTURE: CPT

## 2019-02-07 LAB
ALBUMIN SERPL-MCNC: 3.4 G/DL (ref 3.5–5)
ALBUMIN/GLOB SERPL: 0.8 {RATIO} (ref 1.1–2.2)
ALP SERPL-CCNC: 76 U/L (ref 45–117)
ALT SERPL-CCNC: 46 U/L (ref 12–78)
ANION GAP SERPL CALC-SCNC: 8 MMOL/L (ref 5–15)
AST SERPL-CCNC: 26 U/L (ref 15–37)
BASOPHILS # BLD: 0 K/UL (ref 0–0.1)
BASOPHILS NFR BLD: 0 % (ref 0–1)
BILIRUB SERPL-MCNC: 0.4 MG/DL (ref 0.2–1)
BUN SERPL-MCNC: 30 MG/DL (ref 6–20)
BUN/CREAT SERPL: 26 (ref 12–20)
CALCIUM SERPL-MCNC: 9.2 MG/DL (ref 8.5–10.1)
CHLORIDE SERPL-SCNC: 102 MMOL/L (ref 97–108)
CO2 SERPL-SCNC: 27 MMOL/L (ref 21–32)
CREAT SERPL-MCNC: 1.16 MG/DL (ref 0.55–1.02)
DIFFERENTIAL METHOD BLD: ABNORMAL
EOSINOPHIL # BLD: 0 K/UL (ref 0–0.4)
EOSINOPHIL NFR BLD: 0 % (ref 0–7)
ERYTHROCYTE [DISTWIDTH] IN BLOOD BY AUTOMATED COUNT: 12.4 % (ref 11.5–14.5)
GLOBULIN SER CALC-MCNC: 4.2 G/DL (ref 2–4)
GLUCOSE SERPL-MCNC: 84 MG/DL (ref 65–100)
HCT VFR BLD AUTO: 40.6 % (ref 35–47)
HGB BLD-MCNC: 13.5 G/DL (ref 11.5–16)
IMM GRANULOCYTES # BLD AUTO: 0 K/UL (ref 0–0.04)
IMM GRANULOCYTES NFR BLD AUTO: 1 % (ref 0–0.5)
LYMPHOCYTES # BLD: 2.3 K/UL (ref 0.8–3.5)
LYMPHOCYTES NFR BLD: 27 % (ref 12–49)
MCH RBC QN AUTO: 28.5 PG (ref 26–34)
MCHC RBC AUTO-ENTMCNC: 33.3 G/DL (ref 30–36.5)
MCV RBC AUTO: 85.7 FL (ref 80–99)
MONOCYTES # BLD: 0.7 K/UL (ref 0–1)
MONOCYTES NFR BLD: 8 % (ref 5–13)
NEUTS SEG # BLD: 5.7 K/UL (ref 1.8–8)
NEUTS SEG NFR BLD: 65 % (ref 32–75)
NRBC # BLD: 0 K/UL (ref 0–0.01)
NRBC BLD-RTO: 0 PER 100 WBC
PLATELET # BLD AUTO: 497 K/UL (ref 150–400)
PMV BLD AUTO: 10.4 FL (ref 8.9–12.9)
POTASSIUM SERPL-SCNC: 4.5 MMOL/L (ref 3.5–5.1)
PROT SERPL-MCNC: 7.6 G/DL (ref 6.4–8.2)
RBC # BLD AUTO: 4.74 M/UL (ref 3.8–5.2)
SODIUM SERPL-SCNC: 137 MMOL/L (ref 136–145)
WBC # BLD AUTO: 8.8 K/UL (ref 3.6–11)

## 2019-02-07 PROCEDURE — 85025 COMPLETE CBC W/AUTO DIFF WBC: CPT

## 2019-02-07 PROCEDURE — 80053 COMPREHEN METABOLIC PANEL: CPT

## 2019-02-07 PROCEDURE — 36415 COLL VENOUS BLD VENIPUNCTURE: CPT

## 2019-02-07 PROCEDURE — 80177 DRUG SCRN QUAN LEVETIRACETAM: CPT

## 2019-02-08 LAB — LEVETIRACETAM SERPL-MCNC: 23.7 UG/ML (ref 10–40)

## 2019-02-10 LAB
ANION GAP SERPL CALC-SCNC: 7 MMOL/L (ref 5–15)
BUN SERPL-MCNC: 30 MG/DL (ref 6–20)
BUN/CREAT SERPL: 24 (ref 12–20)
CALCIUM SERPL-MCNC: 8.4 MG/DL (ref 8.5–10.1)
CHLORIDE SERPL-SCNC: 103 MMOL/L (ref 97–108)
CO2 SERPL-SCNC: 26 MMOL/L (ref 21–32)
CREAT SERPL-MCNC: 1.24 MG/DL (ref 0.55–1.02)
GLUCOSE SERPL-MCNC: 120 MG/DL (ref 65–100)
POTASSIUM SERPL-SCNC: 4.3 MMOL/L (ref 3.5–5.1)
SODIUM SERPL-SCNC: 136 MMOL/L (ref 136–145)

## 2019-02-10 PROCEDURE — 80048 BASIC METABOLIC PNL TOTAL CA: CPT

## 2019-02-10 PROCEDURE — 36415 COLL VENOUS BLD VENIPUNCTURE: CPT

## 2019-02-14 LAB
ANION GAP SERPL CALC-SCNC: 8 MMOL/L (ref 5–15)
BUN SERPL-MCNC: 32 MG/DL (ref 6–20)
BUN/CREAT SERPL: 32 (ref 12–20)
CALCIUM SERPL-MCNC: 8.8 MG/DL (ref 8.5–10.1)
CHLORIDE SERPL-SCNC: 106 MMOL/L (ref 97–108)
CO2 SERPL-SCNC: 26 MMOL/L (ref 21–32)
CREAT SERPL-MCNC: 1.01 MG/DL (ref 0.55–1.02)
GLUCOSE SERPL-MCNC: 80 MG/DL (ref 65–100)
POTASSIUM SERPL-SCNC: 4 MMOL/L (ref 3.5–5.1)
SODIUM SERPL-SCNC: 140 MMOL/L (ref 136–145)

## 2019-02-14 PROCEDURE — 36415 COLL VENOUS BLD VENIPUNCTURE: CPT

## 2019-02-14 PROCEDURE — 80048 BASIC METABOLIC PNL TOTAL CA: CPT

## 2019-02-25 ENCOUNTER — OFFICE VISIT (OUTPATIENT)
Dept: FAMILY MEDICINE CLINIC | Age: 64
End: 2019-02-25

## 2019-02-25 VITALS
SYSTOLIC BLOOD PRESSURE: 131 MMHG | BODY MASS INDEX: 18.16 KG/M2 | TEMPERATURE: 97.3 F | DIASTOLIC BLOOD PRESSURE: 72 MMHG | OXYGEN SATURATION: 96 % | RESPIRATION RATE: 16 BRPM | HEIGHT: 65 IN | HEART RATE: 74 BPM

## 2019-02-25 DIAGNOSIS — I10 ESSENTIAL HYPERTENSION WITH GOAL BLOOD PRESSURE LESS THAN 140/90: ICD-10-CM

## 2019-02-25 DIAGNOSIS — Z09 HOSPITAL DISCHARGE FOLLOW-UP: Primary | ICD-10-CM

## 2019-02-25 DIAGNOSIS — I67.9 INTRACRANIAL VASCULAR STENOSIS: ICD-10-CM

## 2019-02-25 DIAGNOSIS — Z86.73 HISTORY OF CVA (CEREBROVASCULAR ACCIDENT): ICD-10-CM

## 2019-02-25 RX ORDER — INSULIN GLARGINE 100 [IU]/ML
20 INJECTION, SOLUTION SUBCUTANEOUS DAILY
Qty: 6 ML | Refills: 2 | Status: SHIPPED | OUTPATIENT
Start: 2019-02-25 | End: 2019-10-28

## 2019-02-25 RX ORDER — CLOPIDOGREL BISULFATE 75 MG/1
75 TABLET ORAL DAILY
Qty: 30 TAB | Refills: 5 | Status: SHIPPED | OUTPATIENT
Start: 2019-02-25 | End: 2019-10-28

## 2019-02-25 NOTE — PATIENT INSTRUCTIONS
After your hospitalization, we are planning for a few medication changes. - your insulin was a sliding scale in the hospital but I would like to switch to a once daily shot called Lantus. We will use 20 units daily and check sugars. If the sugars are running > 200 for 3 days in a row, please increase by 3 units (ex. from 20 units to 23 units). Please call me if your sugars are running > 400 so we can adjust insulin further. 
 
- stop the Omeprazole (this was from Dr. Brandon Pena for stomach pain) - we will no longer need this 
 
- your neurologist recommended taking Plavix 75 mg daily and Aspirin 81 mg daily for the brain issue. Also keep taking Atorvastatin (Lipitor) 10 mg daily 
 
- your blood pressure medications from home will stay the same (Lisinopril/HCTZ and Metoprolol)

## 2019-02-25 NOTE — PROGRESS NOTES
Chief Complaint Patient presents with [facility] Establish Care Transferring from [facility] 1. Have you been to the ER, urgent care clinic since your last visit? Hospitalized since your last visit? Yes Where: [facility] 
 
2. Have you seen or consulted any other health care providers outside of the [address] [place] [address] since your last visit? Include any pap smears or colon screening. Yes Where: [facility] Discharged 2/20/19

## 2019-02-25 NOTE — PROGRESS NOTES
Subjective: Chief Complaint Patient presents with [address] [place] [address] [facility] Establish Care Transferring from [facility] She  is a 58 y.o. female who presents for evaluation of: 
Newer pt - prev seen at [facility] and I last saw in 8/2018. Son in law translates for pt. Here for hosp f/u d/c - admitted from 1/27/19- 2/1/19 for Acute encephalopathy probably due to recurrent CVA. Then went to [address] [place] [address] for rehab and was discharged on 2/20/19 Saw Dr. Argenis Hicks (Neuro) and thought to have intracranial vascular stenosis. 1/28/19 MRI Brain: 
IMPRESSION:   
1. No evidence of large vessel occlusion. 2. Severe focal stenosis in the proximal right P2 segment. 3. Moderate focal stenosis in the proximal right M2 posterior division. 1/29/19 Carotid Dopplers: 
Right Carotid The right CCA is patent. The right ICA is normal. The right ECA is patent. The right vertebral is antegrade. Left Carotid The left CCA is patent. The left ICA is normal. The left ECA is patent. The left vertebral is antegrade. Uncontrolled DM 2 - ongoing issue for many years Lab Results Component Value Date/Time Hemoglobin A1c 10.5 (H) 01/28/2019 06:50 AM  
 Microalbumin/Creat ratio (mg/g creat) 7 06/08/2017 02:16 PM  
 Microalbumin,urine random 1.03 06/08/2017 02:16 PM  
 LDL,Direct 119 (H) 01/25/2013 03:10 PM  
 LDL, calculated 54.6 01/28/2019 06:50 AM  
 Creatinine (POC) 0.6 08/26/2010 08:05 PM  
 Creatinine 1.01 02/14/2019 04:35 AM  
  
Lab Results Component Value Date/Time GFR est AA >60 02/14/2019 04:35 AM  
 GFR est non-AA 55 (L) 02/14/2019 04:35 AM  
  
Lab Results Component Value Date/Time TSH 1.68 01/27/2019 06:56 PM  
   
 
ROS Gen - no fever/chills Resp - no dyspnea or cough CV - no chest pain or OLIVA Rest per HPI Past Medical History:  
Diagnosis Date  Diabetes ([facility]  Hypertension  Stroke ([facility] History reviewed. No pertinent surgical history. Current Outpatient Medications on File Prior to Visit Medication Sig Dispense Refill  levETIRAcetam (KEPPRA) 250 mg tablet Take 1 Tab by mouth every morning AND 2 Tabs every evening. 90 Tab 3  
 metoprolol tartrate (LOPRESSOR) 50 mg tablet Take 1 Tab by mouth two (2) times a day. 30 Tab 1  
 atorvastatin (LIPITOR) 10 mg tablet Take 1 Tab by mouth daily. 30 Tab 2  
 metFORMIN ER (GLUCOPHAGE XR) 500 mg tablet TAKE ONE TABLET BY MOUTH IN THE MORNING AND TWO AT NIGHT FOR ONE WEEK AND THEN TWO TABLETS IN THE MORNING AND TWO TABLETS AT NIGHT 120 Tab 5  
 lisinopril-hydroCHLOROthiazide (PRINZIDE, ZESTORETIC) 20-25 mg per tablet Take 2 Tabs by mouth daily. 60 Tab 5  
 aspirin delayed-release 81 mg tablet Take 1 Tab by mouth daily. 30 Tab 1 No current facility-administered medications on file prior to visit. Objective:  
 
Vitals:  
 02/25/19 1242 BP: 131/72 Pulse: 74 Resp: 16 Temp: 97.3 °F (36.3 °C) TempSrc: Oral  
SpO2: 96% Height: 5' 5\" (1.651 m) Physical Examination: 
General appearance - alert, in no distress, in wheelchair Eyes -sclera anicteric Neck - supple, no significant adenopathy, no thyromegaly, no bruits Chest - clear to auscultation, no wheezes, rales or rhonchi, symmetric air entry Heart - normal rate, regular rhythm, normal S1, S2, no murmurs, rubs, clicks or gallops Neurological - alert, oriented, no focal findings or movement disorder noted, 5-/5 strength in RUE and LLE, abnormal gait and balance Extr - no edema Assessment/ Plan:  
Diagnoses and all orders for this visit: 1. Hospital discharge follow-up 
-     CBC W/O DIFF 
-     METABOLIC PANEL, BASIC 
 
2. Intracranial vascular stenosis - needs Plavix, ASA, and Lipitor 
-     clopidogrel (PLAVIX) 75 mg tab; Take 1 Tab by mouth daily. 3. History of CVA (cerebrovascular accident)- Will work on DM and HTN control.   Sig barriers including language, illiteracy, etc. 
 -     clopidogrel (PLAVIX) 75 mg tab; Take 1 Tab by mouth daily. 4. Uncontrolled type 2 diabetes mellitus without complication, with long-term current use of insulin (HCC) - Prev on 70/30 25 units BID but switched only to sliding scale . Will start with Lantus 20 units and titrate up. 
-     insulin glargine (LANTUS,BASAGLAR) 100 unit/mL (3 mL) inpn; 20 Units by SubCUTAneous route daily. 5. Essential hypertension with goal blood pressure less than 140/90 - controlled, ct current meds I have discussed the diagnosis with the patient and the intended plan as seen in the above orders. The patient has received an after-visit summary and questions were answered concerning future plans. I have discussed medication side effects and warnings with the patient as well. The patient verbalizes understanding and agreement with the plan. Follow-up Disposition: 
Return in about 2 weeks (around 3/11/2019).

## 2019-02-26 LAB
BUN SERPL-MCNC: 20 MG/DL (ref 8–27)
BUN/CREAT SERPL: 15 (ref 12–28)
CALCIUM SERPL-MCNC: 9.7 MG/DL (ref 8.7–10.3)
CHLORIDE SERPL-SCNC: 103 MMOL/L (ref 96–106)
CO2 SERPL-SCNC: 25 MMOL/L (ref 20–29)
CREAT SERPL-MCNC: 1.31 MG/DL (ref 0.57–1)
ERYTHROCYTE [DISTWIDTH] IN BLOOD BY AUTOMATED COUNT: 14.5 % (ref 12.3–15.4)
GLUCOSE SERPL-MCNC: 139 MG/DL (ref 65–99)
HCT VFR BLD AUTO: 39.7 % (ref 34–46.6)
HGB BLD-MCNC: 13.5 G/DL (ref 11.1–15.9)
INTERPRETATION: NORMAL
MCH RBC QN AUTO: 29.2 PG (ref 26.6–33)
MCHC RBC AUTO-ENTMCNC: 34 G/DL (ref 31.5–35.7)
MCV RBC AUTO: 86 FL (ref 79–97)
PLATELET # BLD AUTO: 646 X10E3/UL (ref 150–379)
POTASSIUM SERPL-SCNC: 5.7 MMOL/L (ref 3.5–5.2)
RBC # BLD AUTO: 4.62 X10E6/UL (ref 3.77–5.28)
SODIUM SERPL-SCNC: 142 MMOL/L (ref 134–144)
WBC # BLD AUTO: 9.3 X10E3/UL (ref 3.4–10.8)

## 2019-03-29 ENCOUNTER — OFFICE VISIT (OUTPATIENT)
Dept: FAMILY MEDICINE CLINIC | Age: 64
End: 2019-03-29

## 2019-03-29 VITALS
OXYGEN SATURATION: 97 % | HEART RATE: 75 BPM | BODY MASS INDEX: 17.13 KG/M2 | SYSTOLIC BLOOD PRESSURE: 117 MMHG | WEIGHT: 102.8 LBS | RESPIRATION RATE: 16 BRPM | HEIGHT: 65 IN | TEMPERATURE: 96.1 F | DIASTOLIC BLOOD PRESSURE: 69 MMHG

## 2019-03-29 DIAGNOSIS — I67.9 INTRACRANIAL VASCULAR STENOSIS: ICD-10-CM

## 2019-03-29 DIAGNOSIS — D75.839 THROMBOCYTOSIS: ICD-10-CM

## 2019-03-29 DIAGNOSIS — E87.5 HYPERKALEMIA: ICD-10-CM

## 2019-03-29 DIAGNOSIS — Z86.73 HISTORY OF CVA (CEREBROVASCULAR ACCIDENT): ICD-10-CM

## 2019-03-29 DIAGNOSIS — N18.30 STAGE 3 CHRONIC KIDNEY DISEASE (HCC): ICD-10-CM

## 2019-03-29 DIAGNOSIS — I10 ESSENTIAL HYPERTENSION WITH GOAL BLOOD PRESSURE LESS THAN 140/90: ICD-10-CM

## 2019-03-29 NOTE — PROGRESS NOTES
Subjective:     Chief Complaint   Patient presents with    Diabetes     4 week follow-up        She  is a 58 y.o. female who presents for evaluation of:  Here for routine f/u. Son in law translates for pt. Prev admitted from 1/27/19- 2/1/19 for Acute encephalopathy probably due to recurrent CVA. Then went to [address] [place] [address] for rehab and was discharged on 2/20/19. Saw Dr. Jaye Erickson (Neuro) and thought to have intracranial vascular stenosis. 1/28/19 MRI Brain:  IMPRESSION:    1. No evidence of large vessel occlusion. 2. Severe focal stenosis in the proximal right P2 segment. 3. Moderate focal stenosis in the proximal right M2 posterior division. 1/29/19 Carotid Dopplers:  Right Carotid   The right CCA is patent. The right ICA is normal. The right ECA is patent. The right vertebral is antegrade. Left Carotid   The left CCA is patent. The left ICA is normal. The left ECA is patent. The left vertebral is antegrade. Uncontrolled DM 2 - ongoing issue for many years and improving with insulin. Lab Results   Component Value Date/Time    Hemoglobin A1c 10.5 (H) 01/28/2019 06:50 AM    Microalbumin/Creat ratio (mg/g creat) 7 06/08/2017 02:16 PM    Microalbumin,urine random 1.03 06/08/2017 02:16 PM    LDL,Direct 119 (H) 01/25/2013 03:10 PM    LDL, calculated 54.6 01/28/2019 06:50 AM    Creatinine (POC) 0.6 08/26/2010 08:05 PM    Creatinine 1.31 (H) 02/25/2019 03:15 PM      Lab Results   Component Value Date/Time    GFR est AA 50 (L) 02/25/2019 03:15 PM    GFR est non-AA 43 (L) 02/25/2019 03:15 PM      Lab Results   Component Value Date/Time    TSH 1.68 01/27/2019 06:56 PM         ROS  Gen - no fever/chills  Resp - no dyspnea or cough  CV - no chest pain or OLIVA  Rest per HPI    Past Medical History:   Diagnosis Date    Diabetes ([facility] [address]     Hypertension     Stroke [facility])      History reviewed. No pertinent surgical history.   Current Outpatient Medications on File Prior to Visit   Medication Sig Dispense Refill    clopidogrel (PLAVIX) 75 mg tab Take 1 Tab by mouth daily. 30 Tab 5    insulin glargine (LANTUS,BASAGLAR) 100 unit/mL (3 mL) inpn 20 Units by SubCUTAneous route daily. 6 mL 2    levETIRAcetam (KEPPRA) 250 mg tablet Take 1 Tab by mouth every morning AND 2 Tabs every evening. 90 Tab 3    metoprolol tartrate (LOPRESSOR) 50 mg tablet Take 1 Tab by mouth two (2) times a day. 30 Tab 1    atorvastatin (LIPITOR) 10 mg tablet Take 1 Tab by mouth daily. 30 Tab 2    metFORMIN ER (GLUCOPHAGE XR) 500 mg tablet TAKE ONE TABLET BY MOUTH IN THE MORNING AND TWO AT NIGHT FOR ONE WEEK AND THEN TWO TABLETS IN THE MORNING AND TWO TABLETS AT NIGHT 120 Tab 5    lisinopril-hydroCHLOROthiazide (PRINZIDE, ZESTORETIC) 20-25 mg per tablet Take 2 Tabs by mouth daily. 60 Tab 5    aspirin delayed-release 81 mg tablet Take 1 Tab by mouth daily. 30 Tab 1     No current facility-administered medications on file prior to visit. Objective:     Vitals:    03/29/19 0929   BP: 117/69   Pulse: 75   Resp: 16   Temp: 96.1 °F (35.6 °C)   TempSrc: Oral   SpO2: 97%   Height: 5' 5\" (1.651 m)     Physical Examination:  General appearance - alert, in no distress, in wheelchair  Eyes -sclera anicteric  Neck - supple, no significant adenopathy, no thyromegaly  Chest - clear to auscultation, no wheezes, rales or rhonchi, symmetric air entry  Heart - normal rate, regular rhythm, normal S1, S2, no murmurs, rubs, clicks or gallops  Neurological - alert, oriented, no focal findings or movement disorder noted, 4+/5 strength in RUE and LLE, abnormal gait and balance  Extr - no edema    Assessment/ Plan:   Diagnoses and all orders for this visit:    1. Uncontrolled type 2 diabetes mellitus without complication, with long-term current use of insulin (HCC) - sugars improving on home readings. Will recheck A1C on 4/28/19 after insurance kicks in. Prev on 70/30 25 units BID but switched only to sliding scale.   Not using Lantus as I ordered at last appt but ok if glu readings are controlled  -     METABOLIC PANEL, BASIC    2. Intracranial vascular stenosis  3. History of CVA (cerebrovascular accident)   - ct Plavix, ASA, and Lipitor. Ct to work on DM and HTN control. Sig barriers including language, illiteracy, etc.    4. Essential hypertension with goal blood pressure less than 140/90 - controlled, ct current meds  -     METABOLIC PANEL, BASIC    5. Hyperkalemia  -     METABOLIC PANEL, BASIC    6. Stage 3 chronic kidney disease (HCC) - stable,  rechecking labs. Avoiding anti-inflammatories and working on diabetes control. Blood pressure stable. Encouraged drinking plenty of water.  -     METABOLIC PANEL, BASIC    7. Thrombocytosis ([address] - recheck Plts at next appt    I have discussed the diagnosis with the patient and the intended plan as seen in the above orders. The patient has received an after-visit summary and questions were answered concerning future plans. I have discussed medication side effects and warnings with the patient as well. The patient verbalizes understanding and agreement with the plan. Follow-up and Dispositions    · Return in about 6 weeks (around 5/10/2019).

## 2019-03-29 NOTE — PROGRESS NOTES
Chief Complaint   Patient presents with    Diabetes     4 week follow-up   1. Have you been to the ER, urgent care clinic since your last visit? Hospitalized since your last visit? No    2. Have you seen or consulted any other health care providers outside of the [address] [place] [address] since your last visit? Include any pap smears or colon screening.  No   Appetite remains poor   Gait unsteady and leg weakness

## 2019-04-25 NOTE — LETTER
4/25/2019 4:39 PM 
 
Ms. Dennie Dam [address] [place] [phone] Dear Ms. Reilly: 
 
We've missed you! Please call our office at [phone] and schedule a follow up appointment for your continued care. Your diabetes has been uncontrolled for many years and you need to see Endo. Much of this is related to previous [facility]. Can referred to Endo in [facility] system Sincerely, 
 
 
Stephan Brantley MD

## 2019-06-13 ENCOUNTER — APPOINTMENT (OUTPATIENT)
Dept: CT IMAGING | Age: 64
DRG: 064 | End: 2019-06-13
Attending: EMERGENCY MEDICINE
Payer: SUBSIDIZED

## 2019-06-13 ENCOUNTER — HOSPITAL ENCOUNTER (INPATIENT)
Age: 64
LOS: 1 days | Discharge: HOME | DRG: 064 | End: 2019-06-18
Attending: EMERGENCY MEDICINE | Admitting: FAMILY MEDICINE
Payer: SUBSIDIZED

## 2019-06-13 ENCOUNTER — OFFICE VISIT (OUTPATIENT)
Dept: FAMILY MEDICINE CLINIC | Age: 64
End: 2019-06-13

## 2019-06-13 ENCOUNTER — APPOINTMENT (OUTPATIENT)
Dept: GENERAL RADIOLOGY | Age: 64
DRG: 064 | End: 2019-06-13
Attending: EMERGENCY MEDICINE
Payer: SUBSIDIZED

## 2019-06-13 VITALS
WEIGHT: 106 LBS | TEMPERATURE: 97.3 F | RESPIRATION RATE: 18 BRPM | HEART RATE: 79 BPM | BODY MASS INDEX: 17.66 KG/M2 | DIASTOLIC BLOOD PRESSURE: 90 MMHG | SYSTOLIC BLOOD PRESSURE: 170 MMHG | HEIGHT: 65 IN | OXYGEN SATURATION: 95 %

## 2019-06-13 DIAGNOSIS — I63.9 CEREBROVASCULAR ACCIDENT (CVA), UNSPECIFIED MECHANISM (HCC): ICD-10-CM

## 2019-06-13 DIAGNOSIS — I10 ESSENTIAL HYPERTENSION WITH GOAL BLOOD PRESSURE LESS THAN 140/90: ICD-10-CM

## 2019-06-13 DIAGNOSIS — D75.839 THROMBOCYTOSIS: ICD-10-CM

## 2019-06-13 DIAGNOSIS — L84 PRE-ULCERATIVE CORN OR CALLOUS: ICD-10-CM

## 2019-06-13 DIAGNOSIS — N18.30 STAGE 3 CHRONIC KIDNEY DISEASE (HCC): ICD-10-CM

## 2019-06-13 DIAGNOSIS — M79.671 RIGHT FOOT PAIN: ICD-10-CM

## 2019-06-13 DIAGNOSIS — Z79.899 ENCOUNTER FOR LONG-TERM (CURRENT) USE OF MEDICATIONS: ICD-10-CM

## 2019-06-13 DIAGNOSIS — E87.5 HYPERKALEMIA: ICD-10-CM

## 2019-06-13 DIAGNOSIS — R41.0 DELIRIUM: Primary | ICD-10-CM

## 2019-06-13 DIAGNOSIS — R56.9 SEIZURE (HCC): ICD-10-CM

## 2019-06-13 DIAGNOSIS — I66.01 STENOSIS OF MIDDLE CEREBRAL ARTERY, RIGHT: ICD-10-CM

## 2019-06-13 DIAGNOSIS — Z86.73 HISTORY OF CVA (CEREBROVASCULAR ACCIDENT): ICD-10-CM

## 2019-06-13 DIAGNOSIS — R41.82 ALTERED MENTAL STATUS, UNSPECIFIED ALTERED MENTAL STATUS TYPE: ICD-10-CM

## 2019-06-13 DIAGNOSIS — I67.2 INTRACRANIAL ATHEROSCLEROSIS: ICD-10-CM

## 2019-06-13 DIAGNOSIS — I63.511 ACUTE ISCHEMIC RIGHT MCA STROKE (HCC): ICD-10-CM

## 2019-06-13 LAB
ALBUMIN SERPL-MCNC: 3.6 G/DL (ref 3.5–5)
ALBUMIN/GLOB SERPL: 0.9 {RATIO} (ref 1.1–2.2)
ALP SERPL-CCNC: 87 U/L (ref 45–117)
ALT SERPL-CCNC: 25 U/L (ref 12–78)
ANION GAP SERPL CALC-SCNC: 7 MMOL/L (ref 5–15)
APPEARANCE UR: CLEAR
APTT PPP: 28.8 SEC (ref 22.1–32)
AST SERPL-CCNC: 23 U/L (ref 15–37)
ATRIAL RATE: 67 BPM
BACTERIA URNS QL MICRO: NEGATIVE /HPF
BASOPHILS # BLD: 0 K/UL (ref 0–0.1)
BASOPHILS NFR BLD: 0 % (ref 0–1)
BILIRUB SERPL-MCNC: 0.3 MG/DL (ref 0.2–1)
BILIRUB UR QL: NEGATIVE
BUN SERPL-MCNC: 16 MG/DL (ref 6–20)
BUN/CREAT SERPL: 15 (ref 12–20)
CALCIUM SERPL-MCNC: 8.8 MG/DL (ref 8.5–10.1)
CALCULATED P AXIS, ECG09: 71 DEGREES
CALCULATED R AXIS, ECG10: 26 DEGREES
CALCULATED T AXIS, ECG11: 41 DEGREES
CHLORIDE SERPL-SCNC: 108 MMOL/L (ref 97–108)
CO2 SERPL-SCNC: 24 MMOL/L (ref 21–32)
COLOR UR: NORMAL
COMMENT, HOLDF: NORMAL
CREAT SERPL-MCNC: 1.08 MG/DL (ref 0.55–1.02)
DIAGNOSIS, 93000: NORMAL
DIFFERENTIAL METHOD BLD: ABNORMAL
EOSINOPHIL # BLD: 0.1 K/UL (ref 0–0.4)
EOSINOPHIL NFR BLD: 1 % (ref 0–7)
EPITH CASTS URNS QL MICRO: NORMAL /LPF
ERYTHROCYTE [DISTWIDTH] IN BLOOD BY AUTOMATED COUNT: 12.1 % (ref 11.5–14.5)
GLOBULIN SER CALC-MCNC: 4.2 G/DL (ref 2–4)
GLUCOSE SERPL-MCNC: 150 MG/DL (ref 65–100)
GLUCOSE UR STRIP.AUTO-MCNC: NEGATIVE MG/DL
HCT VFR BLD AUTO: 41.4 % (ref 35–47)
HGB BLD-MCNC: 13.7 G/DL (ref 11.5–16)
HGB UR QL STRIP: NEGATIVE
HYALINE CASTS URNS QL MICRO: NORMAL /LPF (ref 0–5)
IMM GRANULOCYTES # BLD AUTO: 0.1 K/UL (ref 0–0.04)
IMM GRANULOCYTES NFR BLD AUTO: 1 % (ref 0–0.5)
INR PPP: 1.1 (ref 0.9–1.1)
KETONES UR QL STRIP.AUTO: NEGATIVE MG/DL
LACTATE BLD-SCNC: 3 MMOL/L (ref 0.4–2)
LEUKOCYTE ESTERASE UR QL STRIP.AUTO: NEGATIVE
LYMPHOCYTES # BLD: 4.8 K/UL (ref 0.8–3.5)
LYMPHOCYTES NFR BLD: 44 % (ref 12–49)
MCH RBC QN AUTO: 29.1 PG (ref 26–34)
MCHC RBC AUTO-ENTMCNC: 33.1 G/DL (ref 30–36.5)
MCV RBC AUTO: 88.1 FL (ref 80–99)
MONOCYTES # BLD: 0.7 K/UL (ref 0–1)
MONOCYTES NFR BLD: 6 % (ref 5–13)
NEUTS SEG # BLD: 5.3 K/UL (ref 1.8–8)
NEUTS SEG NFR BLD: 48 % (ref 32–75)
NITRITE UR QL STRIP.AUTO: NEGATIVE
NRBC # BLD: 0 K/UL (ref 0–0.01)
NRBC BLD-RTO: 0 PER 100 WBC
P-R INTERVAL, ECG05: 210 MS
PH UR STRIP: 6 [PH] (ref 5–8)
PLATELET # BLD AUTO: 707 K/UL (ref 150–400)
PMV BLD AUTO: 10.1 FL (ref 8.9–12.9)
POTASSIUM SERPL-SCNC: 3.3 MMOL/L (ref 3.5–5.1)
PROT SERPL-MCNC: 7.8 G/DL (ref 6.4–8.2)
PROT UR STRIP-MCNC: NEGATIVE MG/DL
PROTHROMBIN TIME: 10.8 SEC (ref 9–11.1)
Q-T INTERVAL, ECG07: 426 MS
QRS DURATION, ECG06: 74 MS
QTC CALCULATION (BEZET), ECG08: 450 MS
RBC # BLD AUTO: 4.7 M/UL (ref 3.8–5.2)
RBC #/AREA URNS HPF: NORMAL /HPF (ref 0–5)
RBC MORPH BLD: ABNORMAL
RBC MORPH BLD: ABNORMAL
SAMPLES BEING HELD,HOLD: NORMAL
SODIUM SERPL-SCNC: 139 MMOL/L (ref 136–145)
SP GR UR REFRACTOMETRY: 1.03 (ref 1–1.03)
THERAPEUTIC RANGE,PTTT: NORMAL SECS (ref 58–77)
TROPONIN I SERPL-MCNC: <0.05 NG/ML
UA: UC IF INDICATED,UAUC: NORMAL
UROBILINOGEN UR QL STRIP.AUTO: 0.2 EU/DL (ref 0.2–1)
VENTRICULAR RATE, ECG03: 67 BPM
WBC # BLD AUTO: 11 K/UL (ref 3.6–11)
WBC URNS QL MICRO: NORMAL /HPF (ref 0–4)

## 2019-06-13 PROCEDURE — 93005 ELECTROCARDIOGRAM TRACING: CPT

## 2019-06-13 PROCEDURE — 84484 ASSAY OF TROPONIN QUANT: CPT

## 2019-06-13 PROCEDURE — 85025 COMPLETE CBC W/AUTO DIFF WBC: CPT

## 2019-06-13 PROCEDURE — 80061 LIPID PANEL: CPT

## 2019-06-13 PROCEDURE — 74011000258 HC RX REV CODE- 258: Performed by: EMERGENCY MEDICINE

## 2019-06-13 PROCEDURE — 85610 PROTHROMBIN TIME: CPT

## 2019-06-13 PROCEDURE — 74011000258 HC RX REV CODE- 258: Performed by: RADIOLOGY

## 2019-06-13 PROCEDURE — 80053 COMPREHEN METABOLIC PANEL: CPT

## 2019-06-13 PROCEDURE — 0042T CT CODE NEURO PERF W CBF: CPT

## 2019-06-13 PROCEDURE — 99218 HC RM OBSERVATION: CPT

## 2019-06-13 PROCEDURE — 95816 EEG AWAKE AND DROWSY: CPT | Performed by: HOSPITALIST

## 2019-06-13 PROCEDURE — 70496 CT ANGIOGRAPHY HEAD: CPT

## 2019-06-13 PROCEDURE — 74011636320 HC RX REV CODE- 636/320: Performed by: RADIOLOGY

## 2019-06-13 PROCEDURE — 96374 THER/PROPH/DIAG INJ IV PUSH: CPT

## 2019-06-13 PROCEDURE — 81001 URINALYSIS AUTO W/SCOPE: CPT

## 2019-06-13 PROCEDURE — 74011250636 HC RX REV CODE- 250/636: Performed by: HOSPITALIST

## 2019-06-13 PROCEDURE — 70450 CT HEAD/BRAIN W/O DYE: CPT

## 2019-06-13 PROCEDURE — 83605 ASSAY OF LACTIC ACID: CPT

## 2019-06-13 PROCEDURE — 99285 EMERGENCY DEPT VISIT HI MDM: CPT

## 2019-06-13 PROCEDURE — 36415 COLL VENOUS BLD VENIPUNCTURE: CPT

## 2019-06-13 PROCEDURE — 71045 X-RAY EXAM CHEST 1 VIEW: CPT

## 2019-06-13 PROCEDURE — 77030011943

## 2019-06-13 PROCEDURE — 74011250636 HC RX REV CODE- 250/636: Performed by: EMERGENCY MEDICINE

## 2019-06-13 PROCEDURE — 85730 THROMBOPLASTIN TIME PARTIAL: CPT

## 2019-06-13 RX ORDER — CLOPIDOGREL BISULFATE 75 MG/1
75 TABLET ORAL DAILY
Status: DISCONTINUED | OUTPATIENT
Start: 2019-06-14 | End: 2019-06-18 | Stop reason: HOSPADM

## 2019-06-13 RX ORDER — ENOXAPARIN SODIUM 100 MG/ML
40 INJECTION SUBCUTANEOUS EVERY 24 HOURS
Status: DISCONTINUED | OUTPATIENT
Start: 2019-06-13 | End: 2019-06-18 | Stop reason: HOSPADM

## 2019-06-13 RX ORDER — SODIUM CHLORIDE 0.9 % (FLUSH) 0.9 %
10 SYRINGE (ML) INJECTION
Status: COMPLETED | OUTPATIENT
Start: 2019-06-13 | End: 2019-06-13

## 2019-06-13 RX ORDER — LISINOPRIL 20 MG/1
20 TABLET ORAL DAILY
Status: DISCONTINUED | OUTPATIENT
Start: 2019-06-14 | End: 2019-06-16

## 2019-06-13 RX ORDER — AMLODIPINE BESYLATE 5 MG/1
5 TABLET ORAL DAILY
Qty: 30 TAB | Refills: 2 | Status: SHIPPED | OUTPATIENT
Start: 2019-06-13 | End: 2019-10-28 | Stop reason: SDUPTHER

## 2019-06-13 RX ORDER — AMLODIPINE BESYLATE 5 MG/1
5 TABLET ORAL DAILY
Status: DISCONTINUED | OUTPATIENT
Start: 2019-06-14 | End: 2019-06-18 | Stop reason: HOSPADM

## 2019-06-13 RX ORDER — ATORVASTATIN CALCIUM 10 MG/1
10 TABLET, FILM COATED ORAL DAILY
Status: DISCONTINUED | OUTPATIENT
Start: 2019-06-14 | End: 2019-06-18 | Stop reason: HOSPADM

## 2019-06-13 RX ORDER — INSULIN GLARGINE 100 [IU]/ML
20 INJECTION, SOLUTION SUBCUTANEOUS DAILY
Status: DISCONTINUED | OUTPATIENT
Start: 2019-06-14 | End: 2019-06-18 | Stop reason: HOSPADM

## 2019-06-13 RX ORDER — SODIUM CHLORIDE 9 MG/ML
75 INJECTION, SOLUTION INTRAVENOUS CONTINUOUS
Status: DISPENSED | OUTPATIENT
Start: 2019-06-13 | End: 2019-06-14

## 2019-06-13 RX ORDER — ACETAMINOPHEN 325 MG/1
650 TABLET ORAL
Status: DISCONTINUED | OUTPATIENT
Start: 2019-06-13 | End: 2019-06-18 | Stop reason: HOSPADM

## 2019-06-13 RX ORDER — SODIUM CHLORIDE 0.9 % (FLUSH) 0.9 %
5-40 SYRINGE (ML) INJECTION EVERY 8 HOURS
Status: DISCONTINUED | OUTPATIENT
Start: 2019-06-13 | End: 2019-06-18 | Stop reason: HOSPADM

## 2019-06-13 RX ORDER — LABETALOL HCL 20 MG/4 ML
5 SYRINGE (ML) INTRAVENOUS
Status: DISCONTINUED | OUTPATIENT
Start: 2019-06-13 | End: 2019-06-18 | Stop reason: HOSPADM

## 2019-06-13 RX ORDER — ASPIRIN 81 MG/1
81 TABLET ORAL DAILY
Status: DISCONTINUED | OUTPATIENT
Start: 2019-06-14 | End: 2019-06-18 | Stop reason: HOSPADM

## 2019-06-13 RX ORDER — SODIUM CHLORIDE 0.9 % (FLUSH) 0.9 %
5-40 SYRINGE (ML) INJECTION AS NEEDED
Status: DISCONTINUED | OUTPATIENT
Start: 2019-06-13 | End: 2019-06-18 | Stop reason: HOSPADM

## 2019-06-13 RX ORDER — DICLOFENAC SODIUM 10 MG/G
GEL TOPICAL 4 TIMES DAILY
Qty: 100 G | Refills: 0 | Status: SHIPPED | OUTPATIENT
Start: 2019-06-13 | End: 2019-10-28

## 2019-06-13 RX ORDER — LEVETIRACETAM 500 MG/1
500 TABLET ORAL 2 TIMES DAILY
Status: DISCONTINUED | OUTPATIENT
Start: 2019-06-13 | End: 2019-06-18 | Stop reason: HOSPADM

## 2019-06-13 RX ORDER — HYDROCHLOROTHIAZIDE 25 MG/1
25 TABLET ORAL DAILY
Status: DISCONTINUED | OUTPATIENT
Start: 2019-06-14 | End: 2019-06-18 | Stop reason: HOSPADM

## 2019-06-13 RX ORDER — ACETAMINOPHEN 650 MG/1
650 SUPPOSITORY RECTAL
Status: DISCONTINUED | OUTPATIENT
Start: 2019-06-13 | End: 2019-06-18 | Stop reason: HOSPADM

## 2019-06-13 RX ADMIN — SODIUM CHLORIDE 100 ML: 900 INJECTION, SOLUTION INTRAVENOUS at 15:04

## 2019-06-13 RX ADMIN — SODIUM CHLORIDE 1000 MG: 900 INJECTION, SOLUTION INTRAVENOUS at 15:12

## 2019-06-13 RX ADMIN — IOPAMIDOL 120 ML: 755 INJECTION, SOLUTION INTRAVENOUS at 15:04

## 2019-06-13 RX ADMIN — SODIUM CHLORIDE 75 ML/HR: 900 INJECTION, SOLUTION INTRAVENOUS at 22:00

## 2019-06-13 RX ADMIN — Medication 10 ML: at 15:04

## 2019-06-13 RX ADMIN — ENOXAPARIN SODIUM 40 MG: 40 INJECTION SUBCUTANEOUS at 21:00

## 2019-06-13 RX ADMIN — Medication 10 ML: at 22:00

## 2019-06-13 NOTE — H&P
History & Physical 
 
Primary Care Provider: Kristopher Irwin MD 
Source of Information: Patient's daughter and son History of Presenting Illness:  
Dennie Dam is a 71 y.o. female who presents with syncope Patient can't understand [place], from Katie, daughter as . Patient can following simple commands, have some simple words only, responses slower than baseline . 71 y.o. female with past medical history significant for mca stroke, aphasia, dm, htn, who presents from ems with chief complaint of syncope. Pt was LKW at 1:30 pm at her pcp's office for a routine following up. After the check up. Her son-in-law is driving her home. On the way to home, patient had syncope episode, LOC about 1-2 mins. Son-in-law pulled the car over and call EMS. Family said her left sided weakness and left facial droop was chronic. EMS arrived on scene to report that pt was vomiting and non-verbal.  
Currently patient is awake. Review of Systems: 
Limited due to her aphasia. Was able to walk slowly when holding furniture or wall. Patient refuse to use a walker at home. Past Medical History:  
Diagnosis Date  Diabetes ([facility] [address]  Hypertension  Stroke ([facility] [address] No past surgical history on file. Prior to Admission medications Medication Sig Start Date End Date Taking? Authorizing Provider  
diclofenac (VOLTAREN) 1 % gel Apply  to affected area four (4) times daily. 6/13/19   Kristopher Irwin MD  
amLODIPine (NORVASC) 5 mg tablet Take 1 Tab by mouth daily. 6/13/19   Kristopher Irwin MD  
clopidogrel (PLAVIX) 75 mg tab Take 1 Tab by mouth daily. 2/25/19   Kristopher Irwin MD  
insulin glargine (LANTUS,BASAGLAR) 100 unit/mL (3 mL) inpn 20 Units by SubCUTAneous route daily. 2/25/19   Kristopher Irwin MD  
levETIRAcetam (KEPPRA) 250 mg tablet Take 1 Tab by mouth every morning AND 2 Tabs every evening.  2/1/19   Kirsten Faulkner MD  
 metoprolol tartrate (LOPRESSOR) 50 mg tablet Take 1 Tab by mouth two (2) times a day. 2/1/19   Valente Altman MD  
atorvastatin (LIPITOR) 10 mg tablet Take 1 Tab by mouth daily. 2/2/19   Valente Altman MD  
metFORMIN ER (GLUCOPHAGE XR) 500 mg tablet TAKE ONE TABLET BY MOUTH IN THE MORNING AND TWO AT NIGHT FOR ONE WEEK AND THEN TWO TABLETS IN THE MORNING AND TWO TABLETS AT NIGHT 12/10/18   Marlin Dominguez MD  
lisinopril-hydroCHLOROthiazide (PRINZIDE, ZESTORETIC) 20-25 mg per tablet Take 2 Tabs by mouth daily. 8/17/18   Tamra Jon MD  
aspirin delayed-release 81 mg tablet Take 1 Tab by mouth daily. 5/9/18   Lizzy Flowers MD  
 
No Known Allergies No family history on file. SOCIAL HISTORY: 
Patient resides: 
[facility] With family care x Smoking history:  
None x Former Chronic Alcohol history:  
None x Social   
Chronic Ambulates: holding furnitures Independently   
w/cane   
w/walker   
w/wc CODE STATUS: 
DNR Full x Other Objective:  
 
Physical Exam:  
 
Visit Vitals /78 Pulse 70 Temp 97.9 °F (36.6 °C) Resp 19 SpO2 97% General:  Alert, cooperative, following simple commands, only simple words when family talking to her. Head:  Normocephalic, without obvious abnormality, atraumatic. Eyes:  Conjunctivae/corneas clear. PERRL, EOMs intact. Nose: Nares normal. Septum midline. Mucosa normal. No drainage or sinus tenderness. Throat: Lips, mucosa, and tongue normal. Teeth and gums normal.  
Neck: Supple, symmetrical, trachea midline, no adenopathy, thyroid: no enlargement/tenderness/nodules, no carotid bruit and no JVD. Back:   Symmetric, no curvature. ROM normal. No CVA tenderness. Lungs:   Clear to auscultation bilaterally. Chest wall:  No tenderness or deformity. Heart:  Regular rate and rhythm, S1, S2 normal, no murmur, click, rub or gallop. Abdomen:   Soft, non-tender. Bowel sounds normal. No masses,  No organomegaly. Extremities: Extremities normal, atraumatic, no cyanosis or edema. Pulses: 2+ and symmetric all extremities. Skin: Skin color, texture, turgor normal. No rashes or lesions Neurologic: Difficulty to assess, able to raise or legs or arms, but left side was weaker 4-/5. Data Review:  
 
Recent Days: 
Recent Labs  
  06/13/19 
1426 WBC 11.0 HGB 13.7 HCT 41.4 * Recent Labs  
  06/13/19 
1426   
K 3.3*  
 CO2 24 * BUN 16  
CREA 1.08* CA 8.8 ALB 3.6 SGOT 23 ALT 25 INR 1.1 No results for input(s): PH, PCO2, PO2, HCO3, FIO2 in the last 72 hours. 24 Hour Results: 
Recent Results (from the past 24 hour(s)) EKG, 12 LEAD, INITIAL Collection Time: 06/13/19  2:19 PM  
Result Value Ref Range Ventricular Rate 67 BPM  
 Atrial Rate 67 BPM  
 P-R Interval 210 ms QRS Duration 74 ms Q-T Interval 426 ms  
 QTC Calculation (Bezet) 450 ms Calculated P Axis 71 degrees Calculated R Axis 26 degrees Calculated T Axis 41 degrees Diagnosis Sinus rhythm with 1st degree AV block When compared with ECG of 27-JAN-2019 20:09, No significant change was found Confirmed by Meghann Nunn MD, Gisela Cameron ([id number]) on 6/13/2019 2:51:13 PM 
  
CBC WITH AUTOMATED DIFF Collection Time: 06/13/19  2:26 PM  
Result Value Ref Range WBC 11.0 3.6 - 11.0 K/uL  
 RBC 4.70 3.80 - 5.20 M/uL  
 HGB 13.7 11.5 - 16.0 g/dL HCT 41.4 35.0 - 47.0 % MCV 88.1 80.0 - 99.0 FL  
 MCH 29.1 26.0 - 34.0 PG  
 MCHC 33.1 30.0 - 36.5 g/dL  
 RDW 12.1 11.5 - 14.5 % PLATELET 014 (H) 746 - 400 K/uL MPV 10.1 8.9 - 12.9 FL  
 NRBC 0.0 0  WBC ABSOLUTE NRBC 0.00 0.00 - 0.01 K/uL NEUTROPHILS 48 32 - 75 % LYMPHOCYTES 44 12 - 49 % MONOCYTES 6 5 - 13 % EOSINOPHILS 1 0 - 7 % BASOPHILS 0 0 - 1 % IMMATURE GRANULOCYTES 1 (H) 0.0 - 0.5 % ABS. NEUTROPHILS 5.3 1.8 - 8.0 K/UL  
 ABS. LYMPHOCYTES 4.8 (H) 0.8 - 3.5 K/UL  
 ABS. MONOCYTES 0.7 0.0 - 1.0 K/UL  
 ABS. EOSINOPHILS 0.1 0.0 - 0.4 K/UL  
 ABS. BASOPHILS 0.0 0.0 - 0.1 K/UL  
 ABS. IMM. GRANS. 0.1 (H) 0.00 - 0.04 K/UL  
 DF SMEAR SCANNED    
 RBC COMMENTS ARIANNA CELLS 
PRESENT 
    
 RBC COMMENTS OVALOCYTES PRESENT 
    
METABOLIC PANEL, COMPREHENSIVE Collection Time: 06/13/19  2:26 PM  
Result Value Ref Range Sodium 139 136 - 145 mmol/L Potassium 3.3 (L) 3.5 - 5.1 mmol/L Chloride 108 97 - 108 mmol/L  
 CO2 24 21 - 32 mmol/L Anion gap 7 5 - 15 mmol/L Glucose 150 (H) 65 - 100 mg/dL BUN 16 6 - 20 MG/DL Creatinine 1.08 (H) 0.55 - 1.02 MG/DL  
 BUN/Creatinine ratio 15 12 - 20 GFR est AA >60 >60 ml/min/1.73m2 GFR est non-AA 51 (L) >60 ml/min/1.73m2 Calcium 8.8 8.5 - 10.1 MG/DL Bilirubin, total 0.3 0.2 - 1.0 MG/DL  
 ALT (SGPT) 25 12 - 78 U/L  
 AST (SGOT) 23 15 - 37 U/L Alk. phosphatase 87 45 - 117 U/L Protein, total 7.8 6.4 - 8.2 g/dL Albumin 3.6 3.5 - 5.0 g/dL Globulin 4.2 (H) 2.0 - 4.0 g/dL A-G Ratio 0.9 (L) 1.1 - 2.2 PTT Collection Time: 06/13/19  2:26 PM  
Result Value Ref Range aPTT 28.8 22.1 - 32.0 sec  
 aPTT, therapeutic range     58.0 - 77.0 SECS  
PROTHROMBIN TIME + INR Collection Time: 06/13/19  2:26 PM  
Result Value Ref Range INR 1.1 0.9 - 1.1 Prothrombin time 10.8 9.0 - 11.1 sec SAMPLES BEING HELD Collection Time: 06/13/19  2:26 PM  
Result Value Ref Range SAMPLES BEING HELD 1RED COMMENT Add-on orders for these samples will be processed based on acceptable specimen integrity and analyte stability, which may vary by analyte. TROPONIN I Collection Time: 06/13/19  2:26 PM  
Result Value Ref Range Troponin-I, Qt. <0.05 <0.05 ng/mL POC LACTIC ACID Collection Time: 06/13/19  2:35 PM  
Result Value Ref Range Lactic Acid (POC) 3.00 (HH) 0.40 - 2.00 mmol/L Imaging: Xr Chest [facility] Result Date: 6/13/2019 IMPRESSION: Central congestion. Small right pleural effusion. Cta Code Neuro Head And Neck W Cont Result Date: 6/13/2019 IMPRESSION: 1. Interval development of severe stenosis right middle cerebral artery origin since 1/28/19. 2. No change in severe right middle cerebral artery posterior division M2 branch origin. 3. Multifocal/diffuse intracranial small/medium cerebral arterial atherosclerosis. 4. Again noted severe focal stenosis right posterior cerebral artery P2 segment. Ct Code Neuro Head Wo Contrast 
 
Result Date: 6/13/2019 IMPRESSION: 1. No acute finding is seen. 2. Remainder of the findings described above similar to the previous examination. Ct Code Neuro Perf W Cbf Result Date: 6/13/2019 IMPRESSION: 1. Expected abnormal perfusion/flow in area of old infarction. 2. No definite other perfusion/flow abnormality demonstrated. Assessment:  
 
Active Problems: 
  Altered mental status (1/27/2019) Plan: 1. Syncope vs new CVA vs seizure: tele monitor in neuro, repeat brain MRI, EEG, will increase keppra to 500mg bid, neuro consult. 2. Severe stenosis of right MCA: per neuro IR, no acute thrombosis, no intervention for now. 3. CKD 3: follow cr  
4. Thrombocytosis: ? Essential thrombocytosis, should consider hematologist consult due to her stroke. 5: IDDM: resume lantus, SSI, will follow bg 
6. HTN: continue home meds Signed By: Marleny Gonzales MD   
 June 13, 2019

## 2019-06-13 NOTE — ED PROVIDER NOTES
71 y.o. female with past medical history significant for mca stroke, aphasia, dm, htn, who presents from ems with chief complaint of weakness. Pt was LKW at 1:30 pm at her pcp's office. Afterwards, she developed left sided weakness and left facial droop. EMS arrived on scene to report that pt was vomiting and non-verbal. There are no other acute medical concerns at this time. PCP: Jerry Brantley MD 
 
Full history, physical exam, and ROS unable to be obtained due to:  Aphasia. chart review: admitted from 1/27-2/1. dx w/ acute encephalopathy probably due to recurrent CVA. MRI brain 1/29: Severe focal stenosis in the proximal right P2 segment and Moderate focal stenosis in the proximal right M2 posterior division. Note written by Rula Harry, as dictated by Magaly Casey MD 2:05 PM 
 
The history is provided by the EMS personnel. No  was used. Past Medical History:  
Diagnosis Date  Diabetes ([facility] [address]  Hypertension  Stroke ([facility] [address] No past surgical history on file. No family history on file. Social History Socioeconomic History  Marital status:  Spouse name: Not on file  Number of children: Not on file  Years of education: Not on file  Highest education level: Not on file Occupational History  Not on file Social Needs  Financial resource strain: Not on file  Food insecurity:  
  Worry: Not on file Inability: Not on file  Transportation needs:  
  Medical: Not on file Non-medical: Not on file Tobacco Use  Smoking status: Never Smoker  Smokeless tobacco: Never Used Substance and Sexual Activity  Alcohol use: No  
  Alcohol/week: 0.0 oz  Drug use: No  
 Sexual activity: Not Currently Lifestyle  Physical activity:  
  Days per week: Not on file Minutes per session: Not on file  Stress: Not on file Relationships  Social connections:  
  Talks on phone: Not on file Gets together: Not on file Attends [employer] service: Not on file Active member of club or organization: Not on file Attends meetings of clubs or organizations: Not on file Relationship status: Not on file  Intimate partner violence:  
  Fear of current or ex partner: Not on file Emotionally abused: Not on file Physically abused: Not on file Forced sexual activity: Not on file Other Topics Concern  Not on file Social History Narrative  Not on file ALLERGIES: Patient has no known allergies. Review of Systems Unable to perform ROS: Patient nonverbal  
Gastrointestinal: Positive for nausea and vomiting. Neurological: Positive for facial asymmetry and weakness. Vitals:  
 06/13/19 1421 BP: 192/90 Pulse: 68 Resp: 12 SpO2: 96% Physical Exam  
Constitutional: She appears well-developed and well-nourished. No distress. HENT:  
Head: Normocephalic and atraumatic. Eyes: Conjunctivae are normal. No scleral icterus. Neck: Neck supple. No tracheal deviation present. Cardiovascular: Normal rate, regular rhythm, normal heart sounds and intact distal pulses. Exam reveals no gallop and no friction rub. No murmur heard. Pulmonary/Chest: Effort normal and breath sounds normal. She has no wheezes. She has no rales. Abdominal: Soft. She exhibits no distension. There is no tenderness. There is no rebound and no guarding. Musculoskeletal: She exhibits no edema. Neurological: Moving both extremities equally. No definite facial droop. No new demonstrable focal neurologic deficits. Skin: Skin is warm and dry. No rash noted. Psychiatric:  
Ams, arousable Nursing note and vitals reviewed. Note written by Allan Waters, as dictated by Kavitha Banks MD 2:05 PM 
MDM Procedures CONSULT NOTE: 
2:10 PM Kavitha Banks MD spoke with Dr. Sheila Bell for neurointerventional Surgery. Discussed available diagnostic tests and clinical findings. CONSULT NOTE: 
2:35 PM Damaris Beckman MD spoke with Dr. Latrice Monterroso, consult for Tele-Neurology. Discussed available diagnostic tests and clinical findings. They agree with admitting pt for observation. Hospitalist Agustin for Admission 3:15 PM 
 
ED Room Number: TN46/64 Patient Name and age:  Bjorn Logan 71 y.o.  female Working Diagnosis: 1. Delirium 2. Cerebrovascular accident (CVA), unspecified mechanism ([facility] [address] Readmission: no 
Isolation Requirements:  no 
Recommended Level of Care:  telemetry Code Status:  full Other: Total critical care time spent exclusive of procedures: 34   minutes

## 2019-06-13 NOTE — ED TRIAGE NOTES
Pt arrives with EMS from doctors office. Pt had been seen by in office for routine labs. Driving home pt went limp with bilateral extremity weakness. Per EMS LEFT sided facial droop noted. Pt nonverbal on arrival and unable to walk which is abnormal. LTKW 1330 today.

## 2019-06-13 NOTE — PROGRESS NOTES
Bedside and Verbal shift change report given to [address] [place] [address] Haley  (oncoming nurse) by Nadege Morejon (offgoing nurse). Report included the following information SBAR, Kardex, ED Summary, Intake/Output, MAR, Recent Results and Cardiac Rhythm NSR.

## 2019-06-13 NOTE — PROGRESS NOTES
Admission Medication Reconciliation: 
Information obtained from:  patient's daughter/Med list from [facility] office Comments/Recommendations: Updated PTA meds/reviewed patient's allergies. Spoke with daughter to verify medication list that was given to her after her PCP's visit. Per daughter, patient has not been taking PLAVIX and LIPITOR since 2019 due to NOT having refills left and family misinterpreted this and thought these meds were D/C'ed by provider.  
 
-Patient is uninsured. Family go to multiple pharmacies that can provide the most economical price. Daughter noted that some of her prescriptions get sent to the wrong pharmacy. Advised to go to one pharmacy or a clinic that has a pharmacy (E.g. [facility]) to avoid RX sent to the wrong pharmacy. New meds that were started today at PCP's office: Amlodipine And diclofenac Allergies:  Patient has no known allergies. Significant PMH/Disease States:  
Past Medical History:  
Diagnosis Date Diabetes ([address] Hypertension Stroke [facility]) Chief Complaint for this Admission: Chief Complaint Patient presents with Extremity Weakness Prior to Admission Medications:  
Prior to Admission Medications Prescriptions Last Dose Informant Patient Reported? Taking? amLODIPine (NORVASC) 5 mg tablet   No Yes Sig: Take 1 Tab by mouth daily. aspirin delayed-release 81 mg tablet   No Yes Sig: Take 1 Tab by mouth daily. atorvastatin (LIPITOR) 10 mg tablet Not Taking at Unknown time  No No  
Sig: Take 1 Tab by mouth daily. clopidogrel (PLAVIX) 75 mg tab Not Taking at Unknown time  No No  
Sig: Take 1 Tab by mouth daily. diclofenac (VOLTAREN) 1 % gel   No Yes Sig: Apply  to affected area four (4) times daily. insulin glargine (LANTUS,BASAGLAR) 100 unit/mL (3 mL) inpn   No Yes Si Units by SubCUTAneous route daily. levETIRAcetam (KEPPRA) 250 mg tablet   No Yes Sig: Take 1 Tab by mouth every morning AND 2 Tabs every evening. lisinopril-hydroCHLOROthiazide (PRINZIDE, ZESTORETIC) 20-25 mg per tablet   No Yes Sig: Take 2 Tabs by mouth daily. metFORMIN ER (GLUCOPHAGE XR) 500 mg tablet   No Yes Sig: TAKE ONE TABLET BY MOUTH IN THE MORNING AND TWO AT NIGHT FOR ONE WEEK AND THEN TWO TABLETS IN THE MORNING AND TWO TABLETS AT NIGHT  
metoprolol tartrate (LOPRESSOR) 50 mg tablet   No Yes Sig: Take 1 Tab by mouth two (2) times a day. Facility-Administered Medications: None

## 2019-06-13 NOTE — PROGRESS NOTES
TRANSFER - IN REPORT: 
 
Verbal report received from [facility] (name) on Edi Rust  being received from ED (unit) for routine progression of care Report consisted of patients Situation, Background, Assessment and  
Recommendations(SBAR). Information from the following report(s) SBAR, Kardex, ED Summary, Intake/Output, MAR, Recent Results and Cardiac Rhythm NSR was reviewed with the receiving nurse. Opportunity for questions and clarification was provided. Assessment completed upon patients arrival to unit and care assumed.

## 2019-06-13 NOTE — PROGRESS NOTES
Subjective: Chief Complaint Patient presents with  Diabetes 6 week follow  Foot Pain She  is a 71 y.o. female who presents for evaluation of: 
Here for routine f/u. Son in law translates for pt. Doing ok today. No major concerns. Prev admitted from 1/27/19- 2/1/19 for Acute encephalopathy probably due to recurrent CVA. Then went to [address] [place] [address] for rehab and was discharged on 2/20/19. Saw Dr. Kirby Garcia (Neuro) and thought to have intracranial vascular stenosis. 1/28/19 MRI Brain: 
IMPRESSION:   
1. No evidence of large vessel occlusion. 2. Severe focal stenosis in the proximal right P2 segment. 3. Moderate focal stenosis in the proximal right M2 posterior division. 1/29/19 Carotid Dopplers: 
Right Carotid The right CCA is patent. The right ICA is normal. The right ECA is patent. The right vertebral is antegrade. Left Carotid The left CCA is patent. The left ICA is normal. The left ECA is patent. The left vertebral is antegrade. Uncontrolled DM 2 - ongoing issue for many years and improving with insulin. Not exercising or dieting. Pt is a non smoker. Lab Results Component Value Date/Time Hemoglobin A1c 10.5 (H) 01/28/2019 06:50 AM  
 Microalbumin/Creat ratio (mg/g creat) 7 06/08/2017 02:16 PM  
 Microalbumin,urine random 1.03 06/08/2017 02:16 PM  
 LDL,Direct 119 (H) 01/25/2013 03:10 PM  
 LDL, calculated 54.6 01/28/2019 06:50 AM  
 Creatinine (POC) 0.6 08/26/2010 08:05 PM  
 Creatinine 1.31 (H) 02/25/2019 03:15 PM  
  
Lab Results Component Value Date/Time GFR est AA 50 (L) 02/25/2019 03:15 PM  
 GFR est non-AA 43 (L) 02/25/2019 03:15 PM  
  
Lab Results Component Value Date/Time TSH 1.68 01/27/2019 06:56 PM  
   
 
ROS Gen - no fever/chills Resp - no dyspnea or cough CV - no chest pain or OLIVA Rest per HPI Past Medical History:  
Diagnosis Date  Diabetes ([facility]  Hypertension  Stroke ([facility] No past surgical history on file. Current Outpatient Medications on File Prior to Visit Medication Sig Dispense Refill  clopidogrel (PLAVIX) 75 mg tab Take 1 Tab by mouth daily. 30 Tab 5  
 insulin glargine (LANTUS,BASAGLAR) 100 unit/mL (3 mL) inpn 20 Units by SubCUTAneous route daily. 6 mL 2  
 levETIRAcetam (KEPPRA) 250 mg tablet Take 1 Tab by mouth every morning AND 2 Tabs every evening. 90 Tab 3  
 metoprolol tartrate (LOPRESSOR) 50 mg tablet Take 1 Tab by mouth two (2) times a day. 30 Tab 1  
 atorvastatin (LIPITOR) 10 mg tablet Take 1 Tab by mouth daily. 30 Tab 2  
 metFORMIN ER (GLUCOPHAGE XR) 500 mg tablet TAKE ONE TABLET BY MOUTH IN THE MORNING AND TWO AT NIGHT FOR ONE WEEK AND THEN TWO TABLETS IN THE MORNING AND TWO TABLETS AT NIGHT 120 Tab 5  
 lisinopril-hydroCHLOROthiazide (PRINZIDE, ZESTORETIC) 20-25 mg per tablet Take 2 Tabs by mouth daily. 60 Tab 5  
 aspirin delayed-release 81 mg tablet Take 1 Tab by mouth daily. 30 Tab 1 No current facility-administered medications on file prior to visit. Objective:  
 
Vitals:  
 06/13/19 1102 06/13/19 1242 BP: 156/85 170/90 Pulse: 79 Resp: 18 Temp: 97.3 °F (36.3 °C) TempSrc: Oral   
SpO2: 95% Weight: 106 lb (48.1 kg) Height: 5' 5\" (1.651 m) Physical Examination: 
General appearance - alert, in no distress, in wheelchair Eyes -sclera anicteric Neck - supple, no significant adenopathy, no thyromegaly Chest - clear to auscultation, no wheezes, rales or rhonchi, symmetric air entry Heart - normal rate, regular rhythm, normal S1, S2, no murmurs, rubs, clicks or gallops Neurological - alert, oriented, no focal findings or movement disorder noted, 4+/5 strength in RUE and LLE, abnormal gait and balance Extr - no edema Assessment/ Plan:  
 
Diagnoses and all orders for this visit: 
 
1. Uncontrolled type 2 diabetes mellitus without complication, with long-term current use of insulin ([address] - A1C 10.5% - goal is < 7.0% - Lantus 20 units - Metformin 2000 mg 
- Ct working on exercise and diet - goal of 10 lbs weight loss by next appt. - UTD on foot exam and microalbumin -     METABOLIC PANEL, BASIC 
-     HEMOGLOBIN A1C WITH EAG 
-     TSH 3RD GENERATION 2. Essential hypertension with goal blood pressure less than 140/90 - BP too high on repeat so will add on low dose Amlodipine and recheck in 6 weeks. 3. History of CVA (cerebrovascular accident) - ct Plavix, ASA, and Lipitor. Ct to work on DM and HTN control. Sig barriers including language, illiteracy, etc. 
 
4. Stage 3 chronic kidney disease ([facility] - stable,  rechecking labs. Avoiding anti-inflammatories and working on diabetes control. Blood pressure stable. Encouraged drinking plenty of water. 
-     CBC W/O DIFF 
-     METABOLIC PANEL, BASIC 
-     HEMOGLOBIN A1C WITH EAG 
-     TSH 3RD GENERATION 5. Encounter for long-term (current) use of medications 
-     CBC W/O DIFF 
-     METABOLIC PANEL, BASIC 
-     HEMOGLOBIN A1C WITH EAG 
-     TSH 3RD GENERATION 6. Hyperkalemia - rechecking labs -     METABOLIC PANEL, BASIC 7. Thrombocytosis ([facility] [address]- rechecking labs -     CBC W/O DIFF I have discussed the diagnosis with the patient and the intended plan as seen in the above orders. The patient has received an after-visit summary and questions were answered concerning future plans. I have discussed medication side effects and warnings with the patient as well. The patient verbalizes understanding and agreement with the plan. Follow-up and Dispositions · Return in about 6 weeks (around 7/25/2019).

## 2019-06-13 NOTE — ED NOTES
PROGRESS NOTE: 
4:27 PM 
Dr. Longoria Round reviewed cta. Suspects right mca stenosis was not new and not candidate for embolectomy at this time.

## 2019-06-14 ENCOUNTER — APPOINTMENT (OUTPATIENT)
Dept: MRI IMAGING | Age: 64
DRG: 064 | End: 2019-06-14
Attending: HOSPITALIST
Payer: SUBSIDIZED

## 2019-06-14 LAB
BUN SERPL-MCNC: 15 MG/DL (ref 8–27)
BUN/CREAT SERPL: 15 (ref 12–28)
CALCIUM SERPL-MCNC: 9.4 MG/DL (ref 8.7–10.3)
CHLORIDE SERPL-SCNC: 103 MMOL/L (ref 96–106)
CHOLEST SERPL-MCNC: 115 MG/DL
CO2 SERPL-SCNC: 20 MMOL/L (ref 20–29)
CREAT SERPL-MCNC: 0.99 MG/DL (ref 0.57–1)
ERYTHROCYTE [DISTWIDTH] IN BLOOD BY AUTOMATED COUNT: 13.8 % (ref 12.3–15.4)
EST. AVERAGE GLUCOSE BLD GHB EST-MCNC: 183 MG/DL
GLUCOSE BLD STRIP.AUTO-MCNC: 146 MG/DL (ref 65–100)
GLUCOSE SERPL-MCNC: 170 MG/DL (ref 65–99)
HBA1C MFR BLD: 8 % (ref 4.8–5.6)
HCT VFR BLD AUTO: 39.2 % (ref 34–46.6)
HDLC SERPL-MCNC: 33 MG/DL
HDLC SERPL: 3.5 {RATIO} (ref 0–5)
HGB BLD-MCNC: 13.3 G/DL (ref 11.1–15.9)
INTERPRETATION: NORMAL
IRON SATN MFR SERPL: 14 % (ref 20–50)
IRON SERPL-MCNC: 37 UG/DL (ref 35–150)
LACTATE SERPL-SCNC: 2 MMOL/L (ref 0.4–2)
LACTATE SERPL-SCNC: 2.7 MMOL/L (ref 0.4–2)
LDLC SERPL CALC-MCNC: 61.8 MG/DL (ref 0–100)
LIPID PROFILE,FLP: NORMAL
Lab: NORMAL
MCH RBC QN AUTO: 30.1 PG (ref 26.6–33)
MCHC RBC AUTO-ENTMCNC: 33.9 G/DL (ref 31.5–35.7)
MCV RBC AUTO: 89 FL (ref 79–97)
PLATELET # BLD AUTO: 596 X10E3/UL (ref 150–450)
POTASSIUM SERPL-SCNC: 4.5 MMOL/L (ref 3.5–5.2)
RBC # BLD AUTO: 4.42 X10E6/UL (ref 3.77–5.28)
SERVICE CMNT-IMP: ABNORMAL
SODIUM SERPL-SCNC: 138 MMOL/L (ref 134–144)
TIBC SERPL-MCNC: 257 UG/DL (ref 250–450)
TRIGL SERPL-MCNC: 101 MG/DL (ref ?–150)
TSH SERPL DL<=0.005 MIU/L-ACNC: 1.52 UIU/ML (ref 0.45–4.5)
VLDLC SERPL CALC-MCNC: 20.2 MG/DL
WBC # BLD AUTO: 8.3 X10E3/UL (ref 3.4–10.8)

## 2019-06-14 PROCEDURE — 83605 ASSAY OF LACTIC ACID: CPT

## 2019-06-14 PROCEDURE — 83540 ASSAY OF IRON: CPT

## 2019-06-14 PROCEDURE — 70551 MRI BRAIN STEM W/O DYE: CPT

## 2019-06-14 PROCEDURE — 74011250637 HC RX REV CODE- 250/637: Performed by: FAMILY MEDICINE

## 2019-06-14 PROCEDURE — 36415 COLL VENOUS BLD VENIPUNCTURE: CPT

## 2019-06-14 PROCEDURE — 81219 CALR GENE COM VARIANTS: CPT

## 2019-06-14 PROCEDURE — 82962 GLUCOSE BLOOD TEST: CPT

## 2019-06-14 PROCEDURE — 97161 PT EVAL LOW COMPLEX 20 MIN: CPT

## 2019-06-14 PROCEDURE — 92610 EVALUATE SWALLOWING FUNCTION: CPT | Performed by: SPEECH-LANGUAGE PATHOLOGIST

## 2019-06-14 PROCEDURE — 74011250636 HC RX REV CODE- 250/636: Performed by: INTERNAL MEDICINE

## 2019-06-14 PROCEDURE — 74011250636 HC RX REV CODE- 250/636: Performed by: HOSPITALIST

## 2019-06-14 PROCEDURE — 74011250637 HC RX REV CODE- 250/637: Performed by: HOSPITALIST

## 2019-06-14 PROCEDURE — 99218 HC RM OBSERVATION: CPT

## 2019-06-14 PROCEDURE — 74011250636 HC RX REV CODE- 250/636: Performed by: NURSE PRACTITIONER

## 2019-06-14 PROCEDURE — 81270 JAK2 GENE: CPT

## 2019-06-14 PROCEDURE — 97165 OT EVAL LOW COMPLEX 30 MIN: CPT

## 2019-06-14 PROCEDURE — 97116 GAIT TRAINING THERAPY: CPT

## 2019-06-14 PROCEDURE — 74011636637 HC RX REV CODE- 636/637: Performed by: HOSPITALIST

## 2019-06-14 PROCEDURE — 74011000258 HC RX REV CODE- 258: Performed by: NURSE PRACTITIONER

## 2019-06-14 RX ORDER — SODIUM CHLORIDE 9 MG/ML
125 INJECTION, SOLUTION INTRAVENOUS CONTINUOUS
Status: DISPENSED | OUTPATIENT
Start: 2019-06-14 | End: 2019-06-15

## 2019-06-14 RX ORDER — HYDROXYUREA 500 MG/1
500 CAPSULE ORAL DAILY
Status: DISCONTINUED | OUTPATIENT
Start: 2019-06-14 | End: 2019-06-17

## 2019-06-14 RX ORDER — HYDRALAZINE HYDROCHLORIDE 20 MG/ML
10 INJECTION INTRAMUSCULAR; INTRAVENOUS ONCE
Status: COMPLETED | OUTPATIENT
Start: 2019-06-14 | End: 2019-06-14

## 2019-06-14 RX ADMIN — ENOXAPARIN SODIUM 40 MG: 40 INJECTION SUBCUTANEOUS at 22:24

## 2019-06-14 RX ADMIN — SODIUM CHLORIDE 500 MG: 900 INJECTION, SOLUTION INTRAVENOUS at 06:47

## 2019-06-14 RX ADMIN — ASPIRIN 81 MG: 81 TABLET ORAL at 09:04

## 2019-06-14 RX ADMIN — HYDRALAZINE HYDROCHLORIDE 10 MG: 20 INJECTION INTRAMUSCULAR; INTRAVENOUS at 04:48

## 2019-06-14 RX ADMIN — Medication 10 ML: at 09:05

## 2019-06-14 RX ADMIN — SODIUM CHLORIDE 125 ML/HR: 900 INJECTION, SOLUTION INTRAVENOUS at 17:33

## 2019-06-14 RX ADMIN — HYDROXYUREA 500 MG: 500 CAPSULE ORAL at 17:25

## 2019-06-14 RX ADMIN — SODIUM CHLORIDE 500 MG: 900 INJECTION, SOLUTION INTRAVENOUS at 17:33

## 2019-06-14 RX ADMIN — AMLODIPINE BESYLATE 5 MG: 5 TABLET ORAL at 09:03

## 2019-06-14 RX ADMIN — CLOPIDOGREL BISULFATE 75 MG: 75 TABLET ORAL at 09:04

## 2019-06-14 RX ADMIN — Medication 10 ML: at 15:23

## 2019-06-14 RX ADMIN — LISINOPRIL 20 MG: 20 TABLET ORAL at 09:03

## 2019-06-14 RX ADMIN — INSULIN GLARGINE 20 UNITS: 100 INJECTION, SOLUTION SUBCUTANEOUS at 11:12

## 2019-06-14 RX ADMIN — HYDROCHLOROTHIAZIDE 25 MG: 25 TABLET ORAL at 09:05

## 2019-06-14 RX ADMIN — ATORVASTATIN CALCIUM 10 MG: 10 TABLET, FILM COATED ORAL at 09:04

## 2019-06-14 RX ADMIN — Medication 10 ML: at 22:25

## 2019-06-14 NOTE — PROGRESS NOTES
IMPRESSION: 
1. Acute small right extreme posterior corona radiata infarct along the anterior lateral white matter of the right atria of the lateral ventricle. 2. Chronic findings of encephalomalacia, volume loss and chronic small vessel ischemic changes again noted.

## 2019-06-14 NOTE — PROGRESS NOTES
Occupational Therapy Note: 
06/14/19 Orders received and acknowledged, chart reviewed. RN cleared patient for therapy. Patient received in bed, does not understand [place], speaks a dialect of [place] ([place]). Google Translate ineffective with translating to specific dialect. Attempted to call  (number on patient board), received busy signal. RN reporting family will be back this afternoon who will be able to translate. Will follow up this afternoon for OT evaluation when family is present. Thank you, Divya Oliver, OTR/L

## 2019-06-14 NOTE — PROGRESS NOTES
There is no family with the patient and she is of  descent. Unable to fully comprehend teaching and respond to questions when asked. Unsure which translation service to request because not sure if patient is coherent and alert enough to understand what is asked of her and the language needed to interpret. Family numbers are daughter aDve Nieto [phone] and her  Braydon Motta [phone] and other daughter Chris Briceno at [phone].

## 2019-06-14 NOTE — PROGRESS NOTES
Physical Therapy: Defer Orders received and acknowledged, chart reviewed, attempted to see patient for PT evaluation. Patient from [place] and does not speak english. No family in the room. Attempted to use [employer]  for patient's dialect ([place]), however it was ineffective. Per nursing, family is able and willing to translate for patient and will be back this afternoon. Will follow up for PT eval at that time as able and appropriate.  
 
Sheila Bell, PT, DPT

## 2019-06-14 NOTE — PROGRESS NOTES
Hospitalist called back and informed me that he would contact the MD and have her call me. MD called and stated she would put in new orders for IV fluids to address the Lactic acid of 3.

## 2019-06-14 NOTE — PROGRESS NOTES
Problem: Seizure Disorder (Adult) Goal: *STG: Remains free of seizure activity Outcome: Progressing Towards Goal 
Goal: *STG: Maintains lab values within therapeutic range Outcome: Progressing Towards Goal 
Goal: *STG/LTG: Complies with medication therapy Outcome: Progressing Towards Goal 
Goal: *STG: Remains free of injury during seizure activity Outcome: Progressing Towards Goal 
Goal: *STG: Remains safe in hospital 
Outcome: Progressing Towards Goal 
Goal: Interventions Outcome: Progressing Towards Goal 
  
Problem: General Medical Care Plan Goal: *Vital signs within specified parameters Outcome: Progressing Towards Goal 
Goal: *Labs within defined limits Outcome: Progressing Towards Goal 
Goal: *Absence of infection signs and symptoms Outcome: Progressing Towards Goal 
Goal: *Optimal pain control at patient's stated goal 
Outcome: Progressing Towards Goal 
Goal: *Skin integrity maintained Outcome: Progressing Towards Goal 
Goal: *Fluid volume balance Outcome: Progressing Towards Goal 
Goal: *Optimize nutritional status Outcome: Progressing Towards Goal 
Goal: *Anxiety reduced or absent Outcome: Progressing Towards Goal 
Goal: *Progressive mobility and function (eg: ADL's) Outcome: Progressing Towards Goal 
  
Problem: Falls - Risk of 
Goal: *Absence of Falls Description Document [facility] Fall Risk and appropriate interventions in the flowsheet. Outcome: Progressing Towards Goal 
Note:  
Fall Risk Interventions: 
  
 
Mentation Interventions: Bed/chair exit alarm, Door open when patient unattended, More frequent rounding Medication Interventions: Bed/chair exit alarm, Patient to call before getting OOB Elimination Interventions: Bed/chair exit alarm, Call light in reach, Patient to call for help with toileting needs, Stay With Me (per policy) Problem: Patient Education: Go to Patient Education Activity Goal: Patient/Family Education Outcome: Progressing Towards Goal 
  
 Problem: Pressure Injury - Risk of 
Goal: *Prevention of pressure injury Description Document Terry Scale and appropriate interventions in the flowsheet. Outcome: Progressing Towards Goal 
  
Problem: Patient Education: Go to Patient Education Activity Goal: Patient/Family Education Outcome: Progressing Towards Goal 
  
Problem: Patient Education: Go to Patient Education Activity Goal: Patient/Family Education Outcome: Progressing Towards Goal

## 2019-06-14 NOTE — PROGRESS NOTES
Problem: Dysphagia (Adult) Goal: *Acute Goals and Plan of Care (Insert Text) Description Speech Pathology Initiated 6/14/19 1. Patient will tolerate a regular diet/thin liquids without overt s/s of aspiration within 7 days 2. Patient will participate in a language assessment as clinically indicated within 7 days Outcome: Progressing Towards Goal 
 
SPEECH LANGUAGE PATHOLOGY BEDSIDE SWALLOW EVALUATION Patient: Sheere Hernandez (63 y.o. female) Date: 6/14/2019 Primary Diagnosis: Altered mental status [R41.82] Precautions:  
 
ASSESSMENT : 
Based on the objective data described below, the patient presents with no oropharyngeal dysphagia for the minimal amount of liquid accepted by patient. She adamantly refused purees or solids with head turning and lip clamping despite encouragement via [facility] phone. Per nursing, she tolerated her pureed tray this morning without any difficulty. Likely patient is OK to go back to baseline diet of regular consistencies however, very limited assessment for dysphagia able to be completed today. She will likely also need family to bring food from home given grand-daughter's report of patient only preferring [place] food. Patient will benefit from skilled intervention to address the above impairments. Patient?s rehabilitation potential is considered to be Good Factors which may influence rehabilitation potential include: ? None noted ? Mental ability/status ? Medical condition ? Home/family situation and support systems ? Safety awareness ? Pain tolerance/management ? Other: PLAN : 
Recommendations and Planned Interventions: 
Diet per MD; likely OK to go back to regulars. Intake will be limited on hospital food, will need family to bring home foods given grand-daughter report. Safe swallowing strategies (upright for all PO, small bites/sips, slow rate) Frequency/Duration: Patient will be followed by speech-language pathology 3 times a week to address goals. Discharge Recommendations: To Be Determined SUBJECTIVE:  
Patient nonverbal. Language barrier but no verbalization with [place] phone either. OBJECTIVE:  
 
Past Medical History:  
Diagnosis Date Diabetes ([address] Hypertension Stroke [facility]) Diet prior to admission: Regular/thin Current Diet:  Puree/thin 
Cognitive and Communication Status: 
Neurologic State: Alert Orientation Level: Other (Comment)(no response to questioning in English and with [place] translation) Cognition: No command following Perception: Appears intact Perseveration: No perseveration noted Safety/Judgement: Not assessed Oral Assessment: 
Oral Assessment Labial: No impairment Dentition: Natural;Intact; Other (comment)(unable to fully assess) Oral Hygiene: moist oral mucosa Lingual: Other (comment)(Unable to assess given poor command following) Velum: Unable to visualize Mandible: No impairment P.O. Trials: 
Patient Position: Upright in bed Vocal quality prior to P.O.: Other (comment)(no vocalization) Consistency Presented: Solid;Puree; Thin liquid How Presented: SLP-fed/presented;Cup/sip;Straw Bolus Acceptance: Impaired(Only accepted 3 sips of liquid) Bolus Formation/Control: No impairment Propulsion: No impairment Oral Residue: None Initiation of Swallow: No impairment Laryngeal Elevation: Functional 
Aspiration Signs/Symptoms: None Pharyngeal Phase Characteristics: No impairment, issues, or problems Oral Phase Severity: No impairment Pharyngeal Phase Severity : No impairment NOMS:  
The NOMS functional outcome measure was used to quantify this patient's level of swallowing impairment. Based on the NOMS, the patient was determined to be at level 3 for swallow function NOMS Swallowing Levels: 
Level 1 (CN): NPO Level 2 (CM): NPO but takes consistency in therapy Level 3 (CL): Takes less than 50% of nutrition p.o. and continues with nonoral feedings; and/or safe with mod cues; and/or max diet restriction Level 4 (CK): Safe swallow but needs mod cues; and/or mod diet restriction; and/or still requires some nonoral feeding/supplements Level 5 (CJ): Safe swallow with min diet restriction; and/or needs min cues Level 6 (CI): Independent with p.o.; rare cues; usually self cues; may need to avoid some foods or needs extra time Level 7 (CH): Independent for all p.o. KAVITHA. (2003). National Outcomes Measurement System (NOMS): Adult Speech-Language Pathology User's Guide. Pain: 
Pain Scale 1: Adult Nonverbal Pain Scale After treatment:  
?            Patient left in no apparent distress sitting up in chair ? Patient left in no apparent distress in bed ? Call bell left within reach ? Nursing notified ? Caregiver present ? Bed alarm activated COMMUNICATION/EDUCATION:  
The patient?s plan of care including recommendations, planned interventions, and recommended diet changes were discussed with: Registered Nurse. ? Posted safety precautions in patient's room. ? Patient/family have participated as able in goal setting and plan of care. ?            Patient/family agree to work toward stated goals and plan of care. ?            Patient understands intent and goals of therapy, but is neutral about his/her participation. ? Patient is unable to participate in goal setting and plan of care. Thank you for this referral. 
Kelton Mayes. Harmony Albarran, MS, CCC-SLP, BCS-S Time Calculation: 12 mins

## 2019-06-14 NOTE — PROGRESS NOTES
Problem: Self Care Deficits Care Plan (Adult) Goal: *Acute Goals and Plan of Care (Insert Text) Description Occupational Therapy Goals Initiated 6/14/2019 1. Patient will perform grooming, in standing, with supervision/set-up within 7 day(s). 2.  Patient will perform upper body ADLs with supervision/set-up within 7 day(s). 3.  Patient will perform lower body ADLs with minimal assistance/contact guard assist within 7 day(s). 4.  Patient will perform toilet transfers with supervision/set-up within 7 day(s). 5.  Patient will perform all aspects of toileting with supervision/set-up within 7 day(s). 6.  Patient will participate in upper extremity therapeutic exercise/activities with supervision/set-up for 10 minutes within 7 day(s). 7.  Patient will utilize energy conservation techniques during functional activities with verbal cues within 7 day(s). 8.  Patient will participate in Fugl-Moreno within 7 day(s). 6/14/2019 1625 by Tierra Mccoy OT Outcome: Progressing Towards Goal 
 
 OCCUPATIONAL THERAPY EVALUATION Patient: Robert Harrison (30 y.o. female) Date: 6/14/2019 Primary Diagnosis: Altered mental status [R41.82] Precautions: Fall, seizure, Non-English speaking (Hand Talk) ASSESSMENT : 
Based on the objective data described below, patient presents with Minimum assistance upper body ADLs, Moderate Assistance lower body ADLs, and Minimum assistance functional mobility following admission for AMS. Patient is received in bed asleep with granddaughter at bedside. Patient with history of R MCA CVA on 1/28/19, which resulted in residual L side weakness and facial droop. Patient is unreliable historian due to confusion and non-english speaking therefore  provides PLOF. She reports that patient lives with family members, has 24 hour supervision/assistance for ADLs (see below), and does not use AD for mobility.  Patient demonstrates poor safety awareness and decreased command following, observed to attempt to walk away from toilet prior to pulling up undergarments. She requires verbal cues for body positioning prior to sitting on toilet for fall prevention. Per GD, patient is oriented to self and place. Patient assisted to stretcher at end of session to transport to MRI, therefore Fugl-Moreno and visual assessment not completed this date. At this time, patient would benefit from [facility] therapy with 24/7 supervision/assistance. If 24 hour assist/supervision is not able to be provided, patient will require SNF-level rehab. The following are barriers to ADL independence while in acute care:  
- Cognitive and/or behavioral: orientation, attention to task, command following and safety awareness - Medical condition: ROM, strength, functional reach, functional endurance, standing balance and medical history   
- Other:    
 
Patient will benefit from skilled acute intervention to address the above impairments. Patients rehabilitation potential is considered to be Good Discharge recommendations: Home health (to increase independence and safety) 24 supervision 
physical assist during all functional mobility If above is not an option then recommend: Rehab at skilled nursing facility (SNF) (to regain functional baseline patient requires rehab) Barriers to discharging home, in addition to above listed impairments: family availability to assist 
total assist driving to follow up medical appointment(s)/groceries/obtain medication 
family availability for education/training to then follow up at home 
level of physical assist required to maintain patient safety. Equipment recommendations for successful discharge (if) home: TBD PLAN : 
Recommendations and Planned Interventions: self care training, functional mobility training, therapeutic exercise, balance training, therapeutic activities, cognitive retraining, endurance activities, neuromuscular re-education, patient education, home safety training and family training/education Frequency/Duration: Patient will be followed by occupational therapy 3 times a week to address goals. SUBJECTIVE:  
Patient non-English speaking. Speak IGBO dialect of [place]. Granddaughter present to translate due to ineffectiveness of  phone (per RN) and use of [employer] to write instructions. OBJECTIVE DATA SUMMARY:  
HISTORY:  
Past Medical History:  
Diagnosis Date  Diabetes ([facility] [address]  Hypertension  Stroke ([facility] [address] No past surgical history on file. Prior Level of Function/Environment/Context: Patient's granddaughter reporting patient receives supervision for all ADLs, with assistance provided during lower body ADLs (bathing and dressing). Home Situation Home Environment: Private residence # Steps to Enter: 0 One/Two Story Residence: Two story Living Alone: No 
Support Systems: Family member(s) Patient Expects to be Discharged to[de-identified] Private residence Current DME Used/Available at Home: Shower chair Tub or Shower Type: Tub/Shower combination Hand dominance: Right EXAMINATION OF PERFORMANCE DEFICITS: 
Cognitive/Behavioral Status: 
Neurologic State: Alert Orientation Level: Oriented to person;Oriented to place(per granddaughter at bedside) Cognition: Decreased attention/concentration;Decreased command following; Impaired decision making; Impulsive;Poor safety awareness Perception: Appears intact Perseveration: No perseveration noted Safety/Judgement: Not assessed Skin: observed skin intact Hearing: Auditory Auditory Impairment: None Vision/Perceptual:   
Tracking: (unable to test due to decreased attention) Range of Motion: 
AROM: Generally decreased, functional In bilateral UEs Strength: 
Strength: Generally decreased, functional In bilateral UEs Coordination: Coordination: Generally decreased, functional In bilateral UEs Fine Motor Skills-Upper: Left Intact; Right Intact Gross Motor Skills-Upper: Left Intact; Right Intact Tone & Sensation: 
Tone: Normal 
Sensation: Intact Balance: 
Sitting: Intact Standing: Impaired; With support(HHA ) Standing - Static: Fair;Constant support Standing - Dynamic : Fair;Constant support Functional Mobility and Transfers for ADLs: 
Bed Mobility: 
Supine to Sit: Contact guard assistance Sit to Supine: Minimum assistance Transfers: 
Sit to Stand: Minimum assistance;Assist x1 Stand to Sit: Minimum assistance;Assist x1 Bed to Chair: Minimum assistance(with HHA) Bathroom Mobility: Minimum assistance(with HHA) Toilet Transfer : Minimum assistance(VCs for body alignment/positioning; for eccentric control) ADL Assessment: 
Feeding: Supervision;Setup Oral Facial Hygiene/Grooming: Minimum assistance Bathing: Moderate assistance; Additional time Upper Body Dressing: Stand-by assistance Lower Body Dressing: Moderate assistance Toileting: Minimum assistance; Moderate assistance(for clothing management; patient did not perform hygiene - unclear if this is cultural or due to patient's cognitive status) ADL Intervention and task modifications: 
Grooming Grooming Assistance: (standing at sink) Washing Hands: Contact guard assistance Toileting Bladder Hygiene: (Patient did not perform; unsure if for cultural or b/c of cognitive status) Clothing Management: Moderate assistance Cues: Physical assistance for pants down;Physical assistance for pants up; Tactile cues provided;Verbal cues provided Cognitive Retraining Safety/Judgement: Not assessed Functional Measure: 
Barthel Index: 
 
Bathin Bladder: 5 Bowels: 5 Groomin Dressin Feedin Mobility: 10 Stairs: 0 Toilet Use: 5 Transfer (Bed to Chair and Back): 10 Total: 45/100 Percentage of impairment  
0% 1-19% 20-39% 40-59% 60-79% 80-99% 100% Barthel Score 0-100 100 99-80 79-60 59-40 20-39 1-19 
 0 The Barthel ADL Index: Guidelines 1. The index should be used as a record of what a patient does, not as a record of what a patient could do. 2. The main aim is to establish degree of independence from any help, physical or verbal, however minor and for whatever reason. 3. The need for supervision renders the patient not independent. 4. A patient's performance should be established using the best available evidence. Asking the patient, friends/relatives and nurses are the usual sources, but direct observation and common sense are also important. However direct testing is not needed. 5. Usually the patient's performance over the preceding 24-48 hours is important, but occasionally longer periods will be relevant. 6. Middle categories imply that the patient supplies over 50 per cent of the effort. 7. Use of aids to be independent is allowed. Dalila Muse., Barthel, D.W. (5910). Functional evaluation: the Barthel Index. [address] [facility] (14)2. Mary Alice Fu brendan MARGARITO Vitale, Nilton Salazar., Rocío Castellon., [place], [address] [place] [address] (1999). Measuring the change indisability after inpatient rehabilitation; comparison of the responsiveness of the Barthel Index and Functional [place] Measure. Journal of Neurology, Neurosurgery, and Psychiatry, 66(4), 036-345. Maura Thompson, N.J.A, ESA Riojas, & Britany Barnes MDameonA. (2004.) Assessment of post-stroke quality of life in cost-effectiveness studies: The usefulness of the Barthel Index and the EuroQoL-5D. [facility], 13, 484-60 Occupational Therapy Evaluation Charge Determination History Examination Decision-Making MEDIUM Complexity : Expanded review of history including physical, cognitive and psychosocial  history  MEDIUM Complexity : 3-5 performance deficits relating to physical, cognitive , or psychosocial skils that result in activity limitations and / or participation restrictions MEDIUM Complexity : Patient may present with comorbidities that affect occupational performnce. Miniml to moderate modification of tasks or assistance (eg, physical or verbal ) with assesment(s) is necessary to enable patient to complete evaluation Based on the above components, the patient evaluation is determined to be of the following complexity level: MEDIUM Pain: 
Patient denied pain this session Activity Tolerance:  
Fair and SpO2 stable on RA Please refer to the flowsheet for vital signs taken during this treatment. After treatment patient left:  
on stretcher with transport to MRI  
COMMUNICATION/EDUCATION:  
The patients plan of care was discussed with: Physical Therapist and Registered Nurse. Home safety education was provided and the patient/caregiver indicated understanding., Patient/family have participated as able in goal setting and plan of care. and Patient/family agree to work toward stated goals and plan of care. This patients plan of care is appropriate for delegation to JOVITA. Thank you for this referral. 
Jovanni Figueroa OT Time Calculation: 18 mins

## 2019-06-14 NOTE — CONSULTS
Neuro consult completed. Dictated note to follow. Pt with recurrent right MCA CVA, now with severe right MCA stenosis at origin. On ASA 81mg and Plavix 75mg daily at home. Will check a P2Y12 and ASA test in AM.  Has thrombocytosis, hem/onc consulted.

## 2019-06-14 NOTE — PROGRESS NOTES
Spiritual Care Partner Volunteer visited patient in room 655 on 6.14.19. Documented by: : Rev. Cassie Mosley.  Saida Garduno; [facility], to contact [phone] [address] call: [phone]

## 2019-06-14 NOTE — PROGRESS NOTES
Spoke to pharmacy about patient refusing PO keppra at 2200 on 6/13/19. Notified hospitalist and he agreed to change order to IV keppra.

## 2019-06-14 NOTE — PROGRESS NOTES
Hospitalist Progress Note Joshua Dahl MD 
Answering service: [phone] OR [phone] from in house phone Date of Service:  2019 NAME:  Sheree Hernandez :  1955 MRN:  [id number] Admission Summary:  
71 y.o. female with past medical history significant for mca stroke, aphasia, dm, htn, who presents from ems with chief complaint of syncope. Pt was LKW at 1:30 pm at her pcp's office for a routine following up. After the check up. Her son-in-law is driving her home. On the way to home, patient had syncope episode, LOC about 1-2 mins. Son-in-law pulled the car over and call EMS. Family said her left sided weakness and left facial droop was chronic. EMS arrived on scene to report that pt was vomiting and non-verbal.  
Currently patient is awake. Interval history / Subjective: W/u in progress no new issues Assessment & Plan: 1. Syncope vs new CVA vs seizure: - Tele monitor in neuro, repeat brain MRI, EEG, will increase keppra to 500mg bid, neuro consult. 2. Severe stenosis of right MCA: per neuro IR, no acute thrombosis, no intervention for now. 3. CKD 3 stable 4. Thrombocytosis: ? Essential thrombocytosis, should consider hematologist consult due to her stroke. 5: IDDM: resume lantus, SSI, will follow bg 
 
6. HTN: continue home meds Code status: Full DVT prophylaxis: SCD Care Plan discussed with: Patient/Family and Nurse Disposition: TBD Hospital Problems  Date Reviewed: 2019 Codes Class Noted POA Altered mental status ICD-10-CM: R41.82 
ICD-9-CM: 780.97  2019 Unknown Review of Systems: A comprehensive review of systems was negative. Vital Signs:  
 Last 24hrs VS reviewed since prior progress note. Most recent are: 
Visit Vitals /73 Pulse 67 Temp 98.2 °F (36.8 °C) Resp 23 Wt 52.6 kg (115 lb 15.4 oz) SpO2 97% BMI 19.30 kg/m² Intake/Output Summary (Last 24 hours) at 6/14/2019 1028 Last data filed at 6/14/2019 0600 Gross per 24 hour Intake 650 ml Output 160 ml Net 490 ml Physical Examination:  
 
 
     
Constitutional:  No acute distress, cooperative, pleasant   
ENT:  Oral mucous moist, oropharynx benign. Neck supple, Resp:  CTA bilaterally. No wheezing/rhonchi/rales. No accessory muscle use CV:  Regular rhythm, normal rate, no murmurs, gallops, rubs GI:  Soft, non distended, non tender. normoactive bowel sounds, no hepatosplenomegaly Musculoskeletal:  No edema, warm, 2+ pulses throughout Neurologic:  Moves all extremities. AAOx3, CN II-XII reviewed Psych:  Good insight, Not anxious nor agitated. Data Review:  
 I personally reviewed  Image and labs Xr Chest [facility] Result Date: 6/13/2019 IMPRESSION: Central congestion. Small right pleural effusion. Cta Code Neuro Head And Neck W Cont Result Date: 6/13/2019 IMPRESSION: 1. Interval development of severe stenosis right middle cerebral artery origin since 1/28/19. 2. No change in severe right middle cerebral artery posterior division M2 branch origin. 3. Multifocal/diffuse intracranial small/medium cerebral arterial atherosclerosis. 4. Again noted severe focal stenosis right posterior cerebral artery P2 segment. Ct Code Neuro Head Wo Contrast 
 
Result Date: 6/13/2019 IMPRESSION: 1. No acute finding is seen. 2. Remainder of the findings described above similar to the previous examination. Ct Code Neuro Perf W Cbf Result Date: 6/13/2019 IMPRESSION: 1. Expected abnormal perfusion/flow in area of old infarction. 2. No definite other perfusion/flow abnormality demonstrated. Labs:  
 
Recent Labs  
  06/13/19 
1426 06/13/19 
1207 WBC 11.0 8.3 HGB 13.7 13.3 HCT 41.4 39.2 * 596* Recent Labs  
  06/13/19 
1426 06/13/19 
1207  138  
K 3.3* 4.5  
 103 CO2 24 20 BUN 16 15  
 CREA 1.08* 0.99 * 170* CA 8.8 9.4 Recent Labs  
  06/13/19 
1426 SGOT 23 ALT 25 AP 87 TBILI 0.3 TP 7.8 ALB 3.6 GLOB 4.2* Recent Labs  
  06/13/19 
1426 INR 1.1 PTP 10.8 APTT 28.8 No results for input(s): FE, TIBC, PSAT, FERR in the last 72 hours. No results found for: FOL, RBCF No results for input(s): PH, PCO2, PO2 in the last 72 hours. Recent Labs  
  06/13/19 
1426 TROIQ <0.05 Lab Results Component Value Date/Time Cholesterol, total 115 06/13/2019 02:26 PM  
 HDL Cholesterol 33 06/13/2019 02:26 PM  
 LDL,Direct 119 (H) 01/25/2013 03:10 PM  
 LDL, calculated 61.8 06/13/2019 02:26 PM  
 Triglyceride 101 06/13/2019 02:26 PM  
 CHOL/HDL Ratio 3.5 06/13/2019 02:26 PM  
 
Lab Results Component Value Date/Time Glucose (POC) 239 (H) 02/01/2019 11:02 AM  
 Glucose (POC) 110 (H) 02/01/2019 06:14 AM  
 Glucose (POC) 173 (H) 01/31/2019 08:49 PM  
 Glucose (POC) 131 (H) 01/31/2019 04:35 PM  
 Glucose (POC) 184 (H) 01/31/2019 11:35 AM  
 
Lab Results Component Value Date/Time Color YELLOW/STRAW 06/13/2019 06:35 PM  
 Appearance CLEAR 06/13/2019 06:35 PM  
 Specific gravity 1.029 06/13/2019 06:35 PM  
 pH (UA) 6.0 06/13/2019 06:35 PM  
 Protein NEGATIVE  06/13/2019 06:35 PM  
 Glucose NEGATIVE  06/13/2019 06:35 PM  
 Ketone NEGATIVE  06/13/2019 06:35 PM  
 Bilirubin NEGATIVE  06/13/2019 06:35 PM  
 Urobilinogen 0.2 06/13/2019 06:35 PM  
 Nitrites NEGATIVE  06/13/2019 06:35 PM  
 Leukocyte Esterase NEGATIVE  06/13/2019 06:35 PM  
 Epithelial cells FEW 06/13/2019 06:35 PM  
 Bacteria NEGATIVE  06/13/2019 06:35 PM  
 WBC 0-4 06/13/2019 06:35 PM  
 RBC 0-5 06/13/2019 06:35 PM  
 
 
 
Medications Reviewed:  
 
Current Facility-Administered Medications Medication Dose Route Frequency  levETIRAcetam (KEPPRA) 500 mg in 0.9% sodium chloride 100 mL IVPB  500 mg IntraVENous Q12H  
 amLODIPine (NORVASC) tablet 5 mg  5 mg Oral DAILY  aspirin delayed-release tablet 81 mg  81 mg Oral DAILY  atorvastatin (LIPITOR) tablet 10 mg  10 mg Oral DAILY  clopidogrel (PLAVIX) tablet 75 mg  75 mg Oral DAILY  insulin glargine (LANTUS) injection 20 Units  20 Units SubCUTAneous DAILY  [Held by provider] levETIRAcetam (KEPPRA) tablet 500 mg  500 mg Oral BID  lisinopril (PRINIVIL, ZESTRIL) tablet 20 mg  20 mg Oral DAILY  sodium chloride (NS) flush 5-40 mL  5-40 mL IntraVENous Q8H  
 sodium chloride (NS) flush 5-40 mL  5-40 mL IntraVENous PRN  
 acetaminophen (TYLENOL) tablet 650 mg  650 mg Oral Q4H PRN Or  
 acetaminophen (TYLENOL) solution 650 mg  650 mg Per NG tube Q4H PRN Or  
 acetaminophen (TYLENOL) suppository 650 mg  650 mg Rectal Q4H PRN  
 labetalol (NORMODYNE;TRANDATE) 20 mg/4 mL (5 mg/mL) injection 5 mg  5 mg IntraVENous Q10MIN PRN  
 enoxaparin (LOVENOX) injection 40 mg  40 mg SubCUTAneous Q24H  hydroCHLOROthiazide (HYDRODIURIL) tablet 25 mg  25 mg Oral DAILY  
 
______________________________________________________________________ EXPECTED LENGTH OF STAY: - - - 
ACTUAL LENGTH OF STAY:          0 Lula Brown MD

## 2019-06-14 NOTE — PROGRESS NOTES
Notified Hospitalist that patient had blood pressure of 192/71 reported by PCT at [facility] [facility]. Patient does not have any medication ordered for elevated BP except labetalol for SbP > 220. Hospitalist entering new order for hydralazine PRN.

## 2019-06-14 NOTE — PROGRESS NOTES
Problem: Seizure Disorder (Adult) Goal: *STG: Remains free of seizure activity Outcome: Progressing Towards Goal 
Goal: *STG: Maintains lab values within therapeutic range Outcome: Progressing Towards Goal 
Goal: *STG/LTG: Complies with medication therapy Outcome: Progressing Towards Goal 
Goal: *STG: Remains free of injury during seizure activity Outcome: Progressing Towards Goal 
Goal: *STG: Remains safe in hospital 
Outcome: Progressing Towards Goal 
Goal: Interventions Outcome: Progressing Towards Goal 
  
Problem: General Medical Care Plan Goal: *Vital signs within specified parameters Outcome: Progressing Towards Goal 
Goal: *Labs within defined limits Outcome: Progressing Towards Goal 
Goal: *Absence of infection signs and symptoms Outcome: Progressing Towards Goal 
Goal: *Optimal pain control at patient's stated goal 
Outcome: Progressing Towards Goal 
Goal: *Skin integrity maintained Outcome: Progressing Towards Goal 
Goal: *Fluid volume balance Outcome: Progressing Towards Goal 
Goal: *Optimize nutritional status Outcome: Progressing Towards Goal 
Goal: *Anxiety reduced or absent Outcome: Progressing Towards Goal 
Goal: *Progressive mobility and function (eg: ADL's) Outcome: Progressing Towards Goal

## 2019-06-14 NOTE — PROGRESS NOTES
Speech Path Consult received and appreciated. Chart reviewed. Patient ate her pureed breakfast without issue per RN report. Not following many commands but there is a language barrier. No family bedside and per RN, poor response to [place] phone. Will hold on skilled assessment until family bedside to provide a better history and clarify baseline functioning. RN agrees with plan. Addendum: Spoke with grand-daughter bedside while patient prepping for EEG. GD reports patient is normally able to engage in conversation, oriented but quiet and not a big talker. She ambulates independently, does stairs but does not drive. She lives with her family and they cook meals for her. She has a good appetite and no difficulty swallowing as baseline but likes mostly [place] foods and will likely not like the foods offered in house. She understands basic, simple English only. Will f/u for evaluation on EEG completed. Sushma Osei MS, CCC-SLP, BCS-S

## 2019-06-14 NOTE — PROGRESS NOTES
Report from [facility] just completed and I released orders. Patient has a documented lactic acid of 3.  Notified hospitalist.

## 2019-06-14 NOTE — PROGRESS NOTES
Occupational Therapy:  
06/14/19 Orders received, chart reviewed and patient evaluated by occupational therapy. Recommend patient to discharge to home with [facility] and 24/7 assistance/supervision. If not able to have 24 hour assistance, patient will require SNF pending progression with skilled acute occupational therapy. Recommend with nursing patient to complete as able in order to maintain strength, endurance and independence: OOB to chair 3x/day, ADLs with supervision/setup and mobilizing to the bathroom for toileting with 1 person assist. Thank you for your assistance. Full evaluation to follow. Thank you.  
BERNADETTE Aponte/L

## 2019-06-14 NOTE — PROGRESS NOTES
Problem: Mobility Impaired (Adult and Pediatric) Goal: *Acute Goals and Plan of Care (Insert Text) Description Physical Therapy Goals Initiated 6/14/2019 1. Patient will move from supine to sit and sit to supine , scoot up and down and roll side to side in bed with independence within 7 day(s). 2.  Patient will transfer from bed to chair and chair to bed with supervision/set-up using the least restrictive device within 7 day(s). 3.  Patient will perform sit to stand with supervision/set-up within 7 day(s). 4.  Patient will ambulate with supervision/set-up for 150 feet with the least restrictive device within 7 day(s). 5.  Patient will ascend/descend 10 stairs with single handrail(s) with minimal assistance/contact guard assist within 7 day(s). Outcome: Progressing Towards Goal 
PHYSICAL THERAPY EVALUATION Patient: Albina Wilson (1 y.o. female) Date: 6/14/2019 Primary Diagnosis: Altered mental status [R41.82] Precautions: Fall, speaks Igbo (no english) ASSESSMENT : 
Based on the objective data described below, the patient presents with decreased strength, impaired balance, poor safety awareness, unsteady gait, and overall decrease in functional mobility compared to baseline function s/p admission for AMS. Patient unreliable historian and does not speak english, however granddaughter present and able to give PLOF. At baseline, patient lives with family, has 24/7 supervision, ambulates without an AD, and has assist/supervision with ADLs. She has a hx of R MCA on 1/28/19. This date, patient transferred supine<>sit with CGA/Ran, transferred sit<>stand with Ran, and ambulated with min/modA due to unsteadiness. Patient with poor safety awareness and impulsive throughout session. Assisted to/from bathroom and ambulated out to stretcher to be taken down for MRI.  Recommending  PT with 24/7 supervision and physical assistance with all mobility upon discharge. If family is unable to provide this level of care, patient will need SNF placement. Patient will benefit from skilled intervention to address the above impairments. Patient?s rehabilitation potential is considered to be Fair Factors which may influence rehabilitation potential include:  
? None noted ? Mental ability/status ? Medical condition ? Home/family situation and support systems ? Safety awareness 
? Pain tolerance/management 
? Other: PLAN : 
Recommendations and Planned Interventions: 
?           Bed Mobility Training             ? Neuromuscular Re-Education ? Transfer Training                   ? Orthotic/Prosthetic Training 
? Gait Training                         ? Modalities ? Therapeutic Exercises           ? Edema Management/Control ? Therapeutic Activities            ? Patient and Family Training/Education ? Other (comment): Frequency/Duration: Patient will be followed by physical therapy  3 times a week to address goals. Discharge Recommendations: Recommending  PT with 24/7 supervision and physical assistance with all mobility upon discharge. If family is unable to provide this level of care, patient will need SNF placement. Further Equipment Recommendations for Discharge: None SUBJECTIVE:  
Patient did not verbalize during session OBJECTIVE DATA SUMMARY:  
HISTORY:   
Past Medical History:  
Diagnosis Date Diabetes ([facility] [address] Hypertension Stroke [facility]) No past surgical history on file. Prior Level of Function/Home Situation: atient lives with family, has 24/7 supervision, ambulates without an AD, and has assist/supervision with ADLs. She has a hx of R MCA on 1/28/19. Personal factors and/or comorbidities impacting plan of care: Cognition, doesn't speak [place]ar[place] White Home Situation Home Environment: Private residence # Steps to Enter: 0 One/Two Story Residence: Two story Living Alone: No 
Support Systems: Family member(s) Patient Expects to be Discharged to[de-identified] Private residence Current DME Used/Available at Home: Shower chair EXAMINATION/PRESENTATION/DECISION MAKING:  
Critical Behavior: 
Neurologic State: Alert Orientation Level: Other (Comment)(no response to questioning in English and with Cyracom translation) Cognition: No command following Safety/Judgement: Not assessed Hearing: Auditory Auditory Impairment: None Skin:   
Edema:  
Range Of Motion: 
AROM: Generally decreased, functional 
  
  
  
PROM: Within functional limits Strength:   
Strength: Generally decreased, functional 
  
  
  
  
  
  
Tone & Sensation:  
Tone: Normal 
  
  
  
  
Sensation: Intact Coordination: 
Coordination: Generally decreased, functional 
Vision:  
  
Functional Mobility: 
Bed Mobility: 
  
Supine to Sit: Contact guard assistance Sit to Supine: Minimum assistance Transfers: 
Sit to Stand: Minimum assistance Stand to Sit: Minimum assistance Bed to Chair: Minimum assistance Balance:  
Sitting: Intact Standing: Impaired; With support Standing - Static: Fair;Constant support Standing - Dynamic : Fair;Constant support Ambulation/Gait Training: 
Distance (ft): 30 Feet (ft) Assistive Device: Gait belt Ambulation - Level of Assistance: Minimal assistance; Moderate assistance Gait Abnormalities: Decreased step clearance;Shuffling gait; Path deviations(forward flexed posture) Base of Support: Narrowed Speed/Neris: Pace decreased (<100 feet/min); Shuffled Step Length: Left shortened;Right shortened Functional Measure: 
Barthel Index: 
Bathin Bladder: 5 Bowels: 5 Groomin Dressin Feeding: 10 Mobility: 10 Stairs: 0 Toilet Use: 5 Transfer (Bed to Chair and Back): 10 Total: 50/100 Percentage of impairment  
0% 1-19% 20-39% 40-59% 60-79% 80-99% 100% Barthel Score 0-100 100 99-80 79-60 59-40 20-39 1-19 
 0 The Barthel ADL Index: Guidelines 1. The index should be used as a record of what a patient does, not as a record of what a patient could do. 2. The main aim is to establish degree of independence from any help, physical or verbal, however minor and for whatever reason. 3. The need for supervision renders the patient not independent. 4. A patient's performance should be established using the best available evidence. Asking the patient, friends/relatives and nurses are the usual sources, but direct observation and common sense are also important. However direct testing is not needed. 5. Usually the patient's performance over the preceding 24-48 hours is important, but occasionally longer periods will be relevant. 6. Middle categories imply that the patient supplies over 50 per cent of the effort. 7. Use of aids to be independent is allowed. Lucinda Root., Barthel, D.W. (2271). Functional evaluation: the Barthel Index. [facility] (14)2. Vilma Ramsey brendan TEMO VitaleF, Shelbi Farley, Ike Mendes., Amarjit [place], [address] [place] [address] (1999). Measuring the change indisability after inpatient rehabilitation; comparison of the responsiveness of the Barthel Index and Functional [place] Measure. Journal of Neurology, Neurosurgery, and Psychiatry, 66(4), 770-045. [address], ESA Riojas, & Annie Rivas MDameonA. (2004.) Assessment of post-stroke quality of life in cost-effectiveness studies: The usefulness of the Barthel Index and the EuroQoL-5D. [facility], 72, 929-05 Physical Therapy Evaluation Charge Determination History Examination Presentation Decision-Making HIGH Complexity :3+ comorbidities / personal factors will impact the outcome/ POC  HIGH Complexity : 4+ Standardized tests and measures addressing body structure, function, activity limitation and / or participation in recreation  LOW Complexity : Stable, uncomplicated  LOW Complexity : FOTO score of  Based on the above components, the patient evaluation is determined to be of the following complexity level: LOW Pain: 
Pain Scale 1: Adult Nonverbal Pain Scale Activity Tolerance:  
Fair, VSS Please refer to the flowsheet for vital signs taken during this treatment. After treatment: Patient taken down for MRI 
? Patient left in no apparent distress sitting up in chair ? Patient left in no apparent distress in bed 
? Call bell left within reach ? Nursing notified ? Caregiver present ? Bed alarm activated COMMUNICATION/EDUCATION:  
The patient?s plan of care was discussed with: Occupational Therapist and Registered Nurse. ?         Fall prevention education was provided and the patient/caregiver indicated understanding. ? Patient/family have participated as able in goal setting and plan of care. ?         Patient/family agree to work toward stated goals and plan of care. ?         Patient understands intent and goals of therapy, but is neutral about his/her participation. ? Patient is unable to participate in goal setting and plan of care. Thank you for this referral. 
Gael Elam, PT, DPT Time Calculation: 17 mins

## 2019-06-15 LAB
ASPIRIN TEST, ASPIRN: 490 ARU
ERYTHROCYTE [DISTWIDTH] IN BLOOD BY AUTOMATED COUNT: 12.1 % (ref 11.5–14.5)
HCT VFR BLD AUTO: 43.4 % (ref 35–47)
HGB BLD-MCNC: 14.1 G/DL (ref 11.5–16)
LACTATE SERPL-SCNC: 0.9 MMOL/L (ref 0.4–2)
LACTATE SERPL-SCNC: 2.8 MMOL/L (ref 0.4–2)
LACTATE SERPL-SCNC: 2.8 MMOL/L (ref 0.4–2)
MCH RBC QN AUTO: 28.6 PG (ref 26–34)
MCHC RBC AUTO-ENTMCNC: 32.5 G/DL (ref 30–36.5)
MCV RBC AUTO: 88 FL (ref 80–99)
NRBC # BLD: 0 K/UL (ref 0–0.01)
NRBC BLD-RTO: 0 PER 100 WBC
P2Y12 PLT RESPONSE,PPPR: 111 PRU (ref 194–418)
PLATELET # BLD AUTO: 455 K/UL (ref 150–400)
PMV BLD AUTO: 10.6 FL (ref 8.9–12.9)
RBC # BLD AUTO: 4.93 M/UL (ref 3.8–5.2)
WBC # BLD AUTO: 6.5 K/UL (ref 3.6–11)

## 2019-06-15 PROCEDURE — 85027 COMPLETE CBC AUTOMATED: CPT

## 2019-06-15 PROCEDURE — 74011250636 HC RX REV CODE- 250/636: Performed by: HOSPITALIST

## 2019-06-15 PROCEDURE — 74011250637 HC RX REV CODE- 250/637: Performed by: HOSPITALIST

## 2019-06-15 PROCEDURE — 74011250636 HC RX REV CODE- 250/636: Performed by: INTERNAL MEDICINE

## 2019-06-15 PROCEDURE — 74011250636 HC RX REV CODE- 250/636: Performed by: NURSE PRACTITIONER

## 2019-06-15 PROCEDURE — 74011000258 HC RX REV CODE- 258: Performed by: NURSE PRACTITIONER

## 2019-06-15 PROCEDURE — 83605 ASSAY OF LACTIC ACID: CPT

## 2019-06-15 PROCEDURE — 85576 BLOOD PLATELET AGGREGATION: CPT

## 2019-06-15 PROCEDURE — 74011250637 HC RX REV CODE- 250/637: Performed by: FAMILY MEDICINE

## 2019-06-15 PROCEDURE — 74011636637 HC RX REV CODE- 636/637: Performed by: HOSPITALIST

## 2019-06-15 PROCEDURE — 99218 HC RM OBSERVATION: CPT

## 2019-06-15 PROCEDURE — 36415 COLL VENOUS BLD VENIPUNCTURE: CPT

## 2019-06-15 RX ORDER — SODIUM CHLORIDE 9 MG/ML
125 INJECTION, SOLUTION INTRAVENOUS CONTINUOUS
Status: DISCONTINUED | OUTPATIENT
Start: 2019-06-15 | End: 2019-06-17

## 2019-06-15 RX ADMIN — Medication 10 ML: at 13:03

## 2019-06-15 RX ADMIN — SODIUM CHLORIDE 500 MG: 900 INJECTION, SOLUTION INTRAVENOUS at 08:17

## 2019-06-15 RX ADMIN — SODIUM CHLORIDE 125 ML/HR: 900 INJECTION, SOLUTION INTRAVENOUS at 21:30

## 2019-06-15 RX ADMIN — Medication 10 ML: at 21:30

## 2019-06-15 RX ADMIN — ENOXAPARIN SODIUM 40 MG: 40 INJECTION SUBCUTANEOUS at 21:30

## 2019-06-15 RX ADMIN — SODIUM CHLORIDE 500 MG: 900 INJECTION, SOLUTION INTRAVENOUS at 18:17

## 2019-06-15 RX ADMIN — ASPIRIN 81 MG: 81 TABLET ORAL at 09:14

## 2019-06-15 RX ADMIN — HYDROXYUREA 500 MG: 500 CAPSULE ORAL at 09:13

## 2019-06-15 RX ADMIN — CLOPIDOGREL BISULFATE 75 MG: 75 TABLET ORAL at 09:14

## 2019-06-15 RX ADMIN — SODIUM CHLORIDE 125 ML/HR: 900 INJECTION, SOLUTION INTRAVENOUS at 12:59

## 2019-06-15 RX ADMIN — INSULIN GLARGINE 20 UNITS: 100 INJECTION, SOLUTION SUBCUTANEOUS at 09:12

## 2019-06-15 RX ADMIN — Medication 10 ML: at 06:00

## 2019-06-15 RX ADMIN — HYDROCHLOROTHIAZIDE 25 MG: 25 TABLET ORAL at 09:14

## 2019-06-15 RX ADMIN — LISINOPRIL 20 MG: 20 TABLET ORAL at 09:13

## 2019-06-15 RX ADMIN — ATORVASTATIN CALCIUM 10 MG: 10 TABLET, FILM COATED ORAL at 09:14

## 2019-06-15 RX ADMIN — AMLODIPINE BESYLATE 5 MG: 5 TABLET ORAL at 09:14

## 2019-06-15 NOTE — PROGRESS NOTES
Problem: Seizure Disorder (Adult) Goal: *STG: Remains free of seizure activity Outcome: Progressing Towards Goal 
Goal: *STG: Maintains lab values within therapeutic range Outcome: Progressing Towards Goal 
Goal: *STG/LTG: Complies with medication therapy Outcome: Progressing Towards Goal 
Goal: *STG: Remains free of injury during seizure activity Outcome: Progressing Towards Goal 
Goal: *STG: Remains safe in hospital 
Outcome: Progressing Towards Goal 
Goal: Interventions Outcome: Progressing Towards Goal 
  
Problem: General Medical Care Plan Goal: *Vital signs within specified parameters Outcome: Progressing Towards Goal 
Goal: *Labs within defined limits Outcome: Progressing Towards Goal 
Goal: *Absence of infection signs and symptoms Outcome: Progressing Towards Goal 
Goal: *Optimal pain control at patient's stated goal 
Outcome: Progressing Towards Goal 
Goal: *Skin integrity maintained Outcome: Progressing Towards Goal 
Goal: *Fluid volume balance Outcome: Progressing Towards Goal 
Goal: *Optimize nutritional status Outcome: Progressing Towards Goal 
Goal: *Anxiety reduced or absent Outcome: Progressing Towards Goal 
Goal: *Progressive mobility and function (eg: ADL's) Outcome: Progressing Towards Goal 
  
Problem: Falls - Risk of 
Goal: *Absence of Falls Description Document Germain Miles Fall Risk and appropriate interventions in the flowsheet. Outcome: Progressing Towards Goal 
  
Problem: Patient Education: Go to Patient Education Activity Goal: Patient/Family Education Outcome: Progressing Towards Goal 
  
Problem: Pressure Injury - Risk of 
Goal: *Prevention of pressure injury Description Document Terry Scale and appropriate interventions in the flowsheet. Outcome: Progressing Towards Goal 
  
Problem: Patient Education: Go to Patient Education Activity Goal: Patient/Family Education Outcome: Progressing Towards Goal 
  
Problem: Diabetes Self-Management Goal: *Disease process and treatment process Description Define diabetes and identify own type of diabetes; list 3 options for treating diabetes. Outcome: Progressing Towards Goal 
Goal: *Incorporating nutritional management into lifestyle Description Describe effect of type, amount and timing of food on blood glucose; list 3 methods for planning meals. Outcome: Progressing Towards Goal 
Goal: *Incorporating physical activity into lifestyle Description State effect of exercise on blood glucose levels. Outcome: Progressing Towards Goal 
Goal: *Developing strategies to promote health/change behavior Description Define the ABC's of diabetes; identify appropriate screenings, schedule and personal plan for screenings. Outcome: Progressing Towards Goal 
Goal: *Using medications safely Description State effect of diabetes medications on diabetes; name diabetes medication taking, action and side effects. Outcome: Progressing Towards Goal 
Goal: *Monitoring blood glucose, interpreting and using results Description Identify recommended blood glucose targets  and personal targets. Outcome: Progressing Towards Goal 
Goal: *Prevention, detection, treatment of acute complications Description List symptoms of hyper- and hypoglycemia; describe how to treat low blood sugar and actions for lowering  high blood glucose level. Outcome: Progressing Towards Goal 
Goal: *Prevention, detection and treatment of chronic complications Description Define the natural course of diabetes and describe the relationship of blood glucose levels to long term complications of diabetes. Outcome: Progressing Towards Goal 
Goal: *Developing strategies to address psychosocial issues Description Describe feelings about living with diabetes; identify support needed and support network Outcome: Progressing Towards Goal 
Goal: *Sick day guidelines Outcome: Progressing Towards Goal 
  
 Problem: Patient Education: Go to Patient Education Activity Goal: Patient/Family Education Outcome: Progressing Towards Goal 
  
Problem: Patient Education: Go to Patient Education Activity Goal: Patient/Family Education Outcome: Progressing Towards Goal

## 2019-06-15 NOTE — PROGRESS NOTES
-Hematology / Oncology ([facility]) - 
-Primary Oncologist-  
-CC- 
 
-S-  No events 
 
-O-  
  
Patient Vitals for the past 24 hrs: 
 Temp Pulse Resp BP SpO2  
06/15/19 1000 96.2 °F (35.7 °C) 84 17 142/82 96 % 06/15/19 0912  83  142/82   
06/14/19 2308 97.6 °F (36.4 °C) 73 20 145/78 95 % 06/14/19 2000     96 % 06/14/19 1809 97.6 °F (36.4 °C) 78 15 165/84 96 % 06/14/19 1440 97.4 °F (36.3 °C) 82 19 156/74 94 % 06/15 0701 - 06/15 1900 In: 360 [P.O.:360] Out: -  
Gen: nad Chest: bilateral breath sounds present Cardiac: rrr Abd: s/nt 
 
-Labs-   
Recent Labs  
  06/15/19 
0303 06/13/19 
1426 06/13/19 
1207 WBC 6.5 11.0 8.3 HGB 14.1 13.7 13.3 * 707* 596* ANEU  --  5.3  --   
INR  --  1.1  --   
APTT  --  28.8  --   
NA  --  139 138 K  --  3.3* 4.5 GLU  --  150* 170* BUN  --  16 15 CREA  --  1.08* 0.99 ALT  --  25  --   
SGOT  --  23  --   
TBILI  --  0.3  --   
AP  --  87  --   
CA  --  8.8 9.4  
 
 
-Imaging-  
 
 
-Assessment + Plan-    
*) Thrombocytosis JAK2 pending On hydrea PLT count down to 455k Continue hydrea for now 
 
*)  No new rec's 
 following

## 2019-06-15 NOTE — PROGRESS NOTES
Hospitalist Progress Note Joshua Dahl MD 
Answering service: [phone] OR [phone] from in house phone Date of Service:  6/15/2019 NAME:  Sheree Hernandez :  1955 MRN:  [id number] Admission Summary:  
71 y.o. female with past medical history significant for mca stroke, aphasia, dm, htn, who presents from ems with chief complaint of syncope. Pt was LKW at 1:30 pm at her pcp's office for a routine following up. After the check up. Her son-in-law is driving her home. On the way to home, patient had syncope episode, LOC about 1-2 mins. Son-in-law pulled the car over and call EMS. Family said her left sided weakness and left facial droop was chronic. EMS arrived on scene to report that pt was vomiting and non-verbal.  
Currently patient is awake. Interval history / Subjective:  
 pt doing well EEG report reviewed Seen by neuro and oncology for thrombocytosis PLT count 500 after given hydroxyurea Assessment & Plan: 1. Syncope vs new CVA vs seizure: - Tele monitor in neuro she has anew infract on asa / plavix  
- P2Y12 AND ASA TEST NEG  
- repeat brain MRI 1. Acute small right extreme posterior corona radiata infarct along the anterior 
lateral aspect of the right atria of the lateral ventricle. 2. Chronic findings of encephalomalacia, volume loss and chronic small vessel 
ischemic changes again noted. - EEG, will increase keppra to 500mg bid - Reviewed EEG Abnormal EEG due to left temporal sharp waves 2. Severe stenosis of right MCA: per neuro IR, no acute thrombosis, no intervention for now. 3. CKD 3 stable 4. Thrombocytosis: ? Essential thrombocytosis,  
- W/U per hematology  
- started on hydroxyurea plt count 500k 
 
5: IDDM: resume lantus, SSI, will follow bg 
 
6. HTN: continue home meds Code status: Full DVT prophylaxis: SCD Care Plan discussed with: Patient/Family and Nurse Disposition: TBD Hospital Problems  Date Reviewed: 1/27/2019 Codes Class Noted POA Altered mental status ICD-10-CM: R41.82 
ICD-9-CM: 780.97  1/27/2019 Unknown Review of Systems: A comprehensive review of systems was negative. Vital Signs:  
 Last 24hrs VS reviewed since prior progress note. Most recent are: 
Visit Vitals /82 Pulse 83 Temp 97.6 °F (36.4 °C) Resp 20 Wt 53.2 kg (117 lb 4.6 oz) SpO2 95% Breastfeeding? No  
BMI 19.52 kg/m² No intake or output data in the 24 hours ending 06/15/19 0934 Physical Examination:  
 
 
     
Constitutional:  No acute distress, cooperative, ENT:  Oral mucous moist, Resp:  CTA bilaterally. CV:  Regular rhythm, normal rate GI:  Soft, non distended, non tender. BS+ Musculoskeletal:  No edema, warm, 2+ pulses throughout Neurologic:  Moves all extremities. AAOx1 Data Review:  
 I personally reviewed  Image and labs Mri Brain Wo Cont Result Date: 6/14/2019 IMPRESSION: Aspect of the 1. Acute small right extreme posterior corona radiata infarct along the anterior lateral aspect of the right atria of the lateral ventricle. 2. Chronic findings of encephalomalacia, volume loss and chronic small vessel ischemic changes again noted. Xr Chest [facility] Result Date: 6/13/2019 IMPRESSION: Central congestion. Small right pleural effusion. Cta Code Neuro Head And Neck W Cont Result Date: 6/13/2019 IMPRESSION: 1. Interval development of severe stenosis right middle cerebral artery origin since 1/28/19. 2. No change in severe right middle cerebral artery posterior division M2 branch origin. 3. Multifocal/diffuse intracranial small/medium cerebral arterial atherosclerosis. 4. Again noted severe focal stenosis right posterior cerebral artery P2 segment. Ct Code Neuro Head Wo Contrast 
 
Result Date: 6/13/2019 IMPRESSION: 1. No acute finding is seen.  2. Remainder of the findings described above similar to the previous examination. Ct Code Neuro Perf W Cbf Result Date: 6/13/2019 IMPRESSION: 1. Expected abnormal perfusion/flow in area of old infarction. 2. No definite other perfusion/flow abnormality demonstrated. Labs:  
 
Recent Labs  
  06/15/19 
0303 06/13/19 
1426 WBC 6.5 11.0 HGB 14.1 13.7 HCT 43.4 41.4 * 707* Recent Labs  
  06/13/19 
1426 06/13/19 
1207  138  
K 3.3* 4.5  
 103 CO2 24 20 BUN 16 15 CREA 1.08* 0.99 * 170* CA 8.8 9.4 Recent Labs  
  06/13/19 
1426 SGOT 23 ALT 25 AP 87 TBILI 0.3 TP 7.8 ALB 3.6 GLOB 4.2* Recent Labs  
  06/13/19 
1426 INR 1.1 PTP 10.8 APTT 28.8 Recent Labs  
  06/14/19 470 9185 TIBC 257 PSAT 14* No results found for: FOL, RBCF No results for input(s): PH, PCO2, PO2 in the last 72 hours. Recent Labs  
  06/13/19 
1426 TROIQ <0.05 Lab Results Component Value Date/Time Cholesterol, total 115 06/13/2019 02:26 PM  
 HDL Cholesterol 33 06/13/2019 02:26 PM  
 LDL,Direct 119 (H) 01/25/2013 03:10 PM  
 LDL, calculated 61.8 06/13/2019 02:26 PM  
 Triglyceride 101 06/13/2019 02:26 PM  
 CHOL/HDL Ratio 3.5 06/13/2019 02:26 PM  
 
Lab Results Component Value Date/Time Glucose (POC) 146 (H) 06/14/2019 10:09 PM  
 Glucose (POC) 239 (H) 02/01/2019 11:02 AM  
 Glucose (POC) 110 (H) 02/01/2019 06:14 AM  
 Glucose (POC) 173 (H) 01/31/2019 08:49 PM  
 Glucose (POC) 131 (H) 01/31/2019 04:35 PM  
 
Lab Results Component Value Date/Time  Color YELLOW/STRAW 06/13/2019 06:35 PM  
 Appearance CLEAR 06/13/2019 06:35 PM  
 Specific gravity 1.029 06/13/2019 06:35 PM  
 pH (UA) 6.0 06/13/2019 06:35 PM  
 Protein NEGATIVE  06/13/2019 06:35 PM  
 Glucose NEGATIVE  06/13/2019 06:35 PM  
 Ketone NEGATIVE  06/13/2019 06:35 PM  
 Bilirubin NEGATIVE  06/13/2019 06:35 PM  
 Urobilinogen 0.2 06/13/2019 06:35 PM  
 Nitrites NEGATIVE  06/13/2019 06:35 PM  
 Leukocyte Esterase NEGATIVE  06/13/2019 06:35 PM  
 Epithelial cells FEW 06/13/2019 06:35 PM  
 Bacteria NEGATIVE  06/13/2019 06:35 PM  
 WBC 0-4 06/13/2019 06:35 PM  
 RBC 0-5 06/13/2019 06:35 PM  
 
 
 
Medications Reviewed:  
 
Current Facility-Administered Medications Medication Dose Route Frequency  levETIRAcetam (KEPPRA) 500 mg in 0.9% sodium chloride 100 mL IVPB  500 mg IntraVENous Q12H  hydroxyurea (HYDREA) chemo cap 500 mg  500 mg Oral DAILY  0.9% sodium chloride infusion  125 mL/hr IntraVENous CONTINUOUS  
 amLODIPine (NORVASC) tablet 5 mg  5 mg Oral DAILY  aspirin delayed-release tablet 81 mg  81 mg Oral DAILY  atorvastatin (LIPITOR) tablet 10 mg  10 mg Oral DAILY  clopidogrel (PLAVIX) tablet 75 mg  75 mg Oral DAILY  insulin glargine (LANTUS) injection 20 Units  20 Units SubCUTAneous DAILY  [Held by provider] levETIRAcetam (KEPPRA) tablet 500 mg  500 mg Oral BID  lisinopril (PRINIVIL, ZESTRIL) tablet 20 mg  20 mg Oral DAILY  sodium chloride (NS) flush 5-40 mL  5-40 mL IntraVENous Q8H  
 sodium chloride (NS) flush 5-40 mL  5-40 mL IntraVENous PRN  
 acetaminophen (TYLENOL) tablet 650 mg  650 mg Oral Q4H PRN Or  
 acetaminophen (TYLENOL) solution 650 mg  650 mg Per NG tube Q4H PRN Or  
 acetaminophen (TYLENOL) suppository 650 mg  650 mg Rectal Q4H PRN  
 labetalol (NORMODYNE;TRANDATE) 20 mg/4 mL (5 mg/mL) injection 5 mg  5 mg IntraVENous Q10MIN PRN  
 enoxaparin (LOVENOX) injection 40 mg  40 mg SubCUTAneous Q24H  hydroCHLOROthiazide (HYDRODIURIL) tablet 25 mg  25 mg Oral DAILY  
 
______________________________________________________________________ EXPECTED LENGTH OF STAY: - - - 
ACTUAL LENGTH OF STAY:          0 Tanvir Smalls MD

## 2019-06-15 NOTE — PROGRESS NOTES
Problem: Seizure Disorder (Adult) Goal: *STG: Remains free of seizure activity Outcome: Progressing Towards Goal 
Goal: *STG: Maintains lab values within therapeutic range Outcome: Progressing Towards Goal 
Goal: *STG/LTG: Complies with medication therapy Outcome: Not Progressing Towards Goal 
Goal: *STG: Remains free of injury during seizure activity Outcome: Progressing Towards Goal 
Goal: *STG: Remains safe in hospital 
Outcome: Progressing Towards Goal 
Goal: Interventions Outcome: Progressing Towards Goal 
  
Problem: General Medical Care Plan Goal: *Vital signs within specified parameters Outcome: Progressing Towards Goal 
Goal: *Labs within defined limits Outcome: Progressing Towards Goal 
Goal: *Absence of infection signs and symptoms Outcome: Progressing Towards Goal 
Goal: *Optimal pain control at patient's stated goal 
Outcome: Progressing Towards Goal 
Goal: *Skin integrity maintained Outcome: Progressing Towards Goal 
Goal: *Fluid volume balance Outcome: Progressing Towards Goal 
Goal: *Optimize nutritional status Outcome: Progressing Towards Goal 
Goal: *Anxiety reduced or absent Outcome: Progressing Towards Goal 
Goal: *Progressive mobility and function (eg: ADL's) Outcome: Progressing Towards Goal 
  
Problem: Falls - Risk of 
Goal: *Absence of Falls Description Document Ann Marie Basm[place]ie Fall Risk and appropriate interventions in the flowsheet. Outcome: Progressing Towards Goal 
  
Problem: Patient Education: Go to Patient Education Activity Goal: Patient/Family Education Outcome: Progressing Towards Goal 
  
Problem: Pressure Injury - Risk of 
Goal: *Prevention of pressure injury Description Document Terry Scale and appropriate interventions in the flowsheet. Outcome: Progressing Towards Goal 
  
Problem: Patient Education: Go to Patient Education Activity Goal: Patient/Family Education Outcome: Progressing Towards Goal 
  
Problem: Diabetes Self-Management Goal: *Disease process and treatment process Description Define diabetes and identify own type of diabetes; list 3 options for treating diabetes. Outcome: Progressing Towards Goal 
Goal: *Incorporating nutritional management into lifestyle Description Describe effect of type, amount and timing of food on blood glucose; list 3 methods for planning meals. Outcome: Progressing Towards Goal 
Goal: *Incorporating physical activity into lifestyle Description State effect of exercise on blood glucose levels. Outcome: Progressing Towards Goal 
Goal: *Developing strategies to promote health/change behavior Description Define the ABC's of diabetes; identify appropriate screenings, schedule and personal plan for screenings. Outcome: Progressing Towards Goal 
Goal: *Using medications safely Description State effect of diabetes medications on diabetes; name diabetes medication taking, action and side effects. Outcome: Progressing Towards Goal 
Goal: *Monitoring blood glucose, interpreting and using results Description Identify recommended blood glucose targets  and personal targets. Outcome: Progressing Towards Goal 
Goal: *Prevention, detection, treatment of acute complications Description List symptoms of hyper- and hypoglycemia; describe how to treat low blood sugar and actions for lowering  high blood glucose level. Outcome: Progressing Towards Goal 
Goal: *Prevention, detection and treatment of chronic complications Description Define the natural course of diabetes and describe the relationship of blood glucose levels to long term complications of diabetes. Outcome: Progressing Towards Goal 
Goal: *Developing strategies to address psychosocial issues Description Describe feelings about living with diabetes; identify support needed and support network Outcome: Progressing Towards Goal 
Goal: *Insulin pump training Outcome: Progressing Towards Goal 
Goal: *Sick day guidelines Outcome: Progressing Towards Goal 
Goal: *Patient Specific Goal (EDIT GOAL, INSERT TEXT) Outcome: Progressing Towards Goal 
  
Problem: Patient Education: Go to Patient Education Activity Goal: Patient/Family Education Outcome: Progressing Towards Goal 
  
Problem: Patient Education: Go to Patient Education Activity Goal: Patient/Family Education Outcome: Progressing Towards Goal 
  
Problem: Patient Education: Go to Patient Education Activity Goal: Patient/Family Education Outcome: Progressing Towards Goal 
  
Problem: Patient Education: Go to Patient Education Activity Goal: Patient/Family Education Outcome: Progressing Towards Goal

## 2019-06-15 NOTE — PROGRESS NOTES
PROCEDURE: ROUTINE INPATIENT EEG 
NAME:   Momo Sanchez ACCOUNT NUMBER : [de-identified] MRN:   [id number] DATE OF SERVICE: 6/14/2019 HISTORY/INDICATION: Pt is a 69yo female with h/o seizure presenting after a syncopal event. EEG is ordered by the hospitalist.  
 
MEDICATIONS:  
Current Facility-Administered Medications Medication Dose Route Frequency Provider Last Rate Last Dose  levETIRAcetam (KEPPRA) 500 mg in 0.9% sodium chloride 100 mL IVPB  500 mg IntraVENous Q12H Ej Hawk NP   500 mg at 06/14/19 1733  hydroxyurea (HYDREA) chemo cap 500 mg  500 mg Oral DAILY Jayjay Bower MD   500 mg at 06/14/19 1725  
 0.9% sodium chloride infusion  125 mL/hr IntraVENous CONTINUOUS Augusto Kocher,  mL/hr at 06/14/19 1733 125 mL/hr at 06/14/19 1733  amLODIPine (NORVASC) tablet 5 mg  5 mg Oral DAILY Velvet Rodriguez MD   5 mg at 06/14/19 4650  aspirin delayed-release tablet 81 mg  81 mg Oral DAILY Velvet Rodriguez MD   81 mg at 06/14/19 0436  atorvastatin (LIPITOR) tablet 10 mg  10 mg Oral DAILY Velvet Rodriguez MD   10 mg at 06/14/19 0975  clopidogrel (PLAVIX) tablet 75 mg  75 mg Oral DAILY Velvet Rodriguez MD   75 mg at 06/14/19 0043  insulin glargine (LANTUS) injection 20 Units  20 Units SubCUTAneous DAILY Velvet Rodriguez MD   20 Units at 06/14/19 1112  [Held by provider] levETIRAcetam (KEPPRA) tablet 500 mg  500 mg Oral BID Cari Posada MD      
 lisinopril (PRINIVIL, ZESTRIL) tablet 20 mg  20 mg Oral DAILY Velvet Rodriguez MD   20 mg at 06/14/19 9244  sodium chloride (NS) flush 5-40 mL  5-40 mL IntraVENous Q8H Velvet Rodriguez MD   10 mL at 06/14/19 1523  sodium chloride (NS) flush 5-40 mL  5-40 mL IntraVENous PRN Velvet Rodriguez MD      
 acetaminophen (TYLENOL) tablet 650 mg  650 mg Oral Q4H PRN Velvet Rodriguez MD      
 Or  
 [facility] acetaminophen (TYLENOL) solution 650 mg  650 mg Per NG tube Q4H PRN Velvet Rodriguez MD      
 Or  
[address] [place] [address] acetaminophen (TYLENOL) suppository 650 mg  650 mg Rectal Q4H PRN Velvet Rodriguez MD      
  labetalol (NORMODYNE;TRANDATE) 20 mg/4 mL (5 mg/mL) injection 5 mg  5 mg IntraVENous Q10MIN PRN Pebbles Lemus MD      
 enoxaparin (LOVENOX) injection 40 mg  40 mg SubCUTAneous Q24H Cari Posada MD   40 mg at 06/13/19 2100  hydroCHLOROthiazide (HYDRODIURIL) tablet 25 mg  25 mg Oral DAILY Iliana Bone MD   25 mg at 06/14/19 4375 CONDITIONS OF RECORDING: This is a routine 21-channel EEG recording performed in accordance with the international 10-20 system with one channel devoted to limited EKG. This study was done during states of wakefulness and sleep. Photic stimulation was performed as an activating procedure. DESCRIPTION:  
Upon maximal arousal the posterior dominant rhythm has a frequency of 9Hz with an amplitude of 20uV. This activity is symmetric over the bilateral posterior derivations and attenuates with eye opening. Photic stimulation did not significantly alter the tracing. Normal sleep architecture is seen with stage II sleep recognized by the presence of symmetric vertex waves and sleep spindles. Two left temporal sharp waves are seen, occurring singly. There are no other focal abnormalities, epileptiform discharges, or electrographic seizures seen. INTERPRETATION: This is an abnormal EEG due to left temporal sharp waves CLINICAL CORRELATION: Finding is non-specific, but could represent underlying structural or functional abnormality and epileptogenic focus.   
 
Amara Richey MD

## 2019-06-15 NOTE — PROGRESS NOTES
Bedside and Verbal shift change report given to MARTHA Isaacs (oncoming nurse) by Tyra Barros (offgoing nurse). Report included the following information SBAR, Kardex, Intake/Output, MAR, Recent Results and Cardiac Rhythm NSR.

## 2019-06-15 NOTE — CONSULTS
[address] Name:  Marino Sanches 
MR#:  [id number] :  1955 ACCOUNT #:  [de-identified] DATE OF SERVICE:  2019 HISTORY OF PRESENT ILLNESS:  The patient is a 14-year-old woman from [facility] [place] [facility] who speaks only Ma[place], who is seen after admission to [facility] with symptoms of a stroke. We are asked to see her for thrombocytosis. The patient was brought to the emergency room via EMS after a syncope, which occurred after a visit to her PCP's office on the day of admission. She had loss of consciousness for 1-2 minutes, left facial droop was identified. EMS was called. and she was brought to the emergency room. In the ER, she was found to have symptoms worrisome for a new CVA. She had a CBC drawn and platelet count of 273, hemoglobin 13.3, white count 8.3 identified. PAST MEDICAL HISTORY:  Includes CVA, hypertension, diabetes and seizure disorder. She also has chronic kidney disease. PAST SURGICAL HISTORY:  No past surgical history on file. SOCIAL HISTORY:  She is , originally from [place] [facility] [place]. She is a nonsmoker, nondrinker. ALLERGIES:  NO KNOWN DRUG ALLERGIES. REVIEW OF SYSTEMS:  Nonverbal. 
 
PHYSICAL EXAMINATION: 
GENERAL:  She is a pleasant well-developed, well-nourished woman in no apparent distress. VITAL SIGNS:  Her temperature is 97, pulse 67, /73, respirations 25, O2 sat 96%. HEENT:  EOMI. Nonicteric sclerae. NECK:  Supple. LUNGS:  Clear anteriorly. CARDIAC EXAM:  Regular rate and rhythm. No murmur. ABDOMEN:  Soft, nontender. No hepatosplenomegaly noted. EXTREMITIES:  No edema. NEUROLOGIC:  She is nonverbal.  She moves all four extremities. LABORATORY DATA:  White count 11, hemoglobin 13.7, platelet count 248, BUN and creatinine are normal.  Liver enzymes are normal.  Albumin is 3.6.   She had a chest x-ray done on , which showed a central congestion, small right pleural effusion. Head CT scan showed no acute findings. Old lacunar infarcts in the basal ganglia are seen, an area of old infarct in the right parietal lobe, which was unchanged was seen. IMPRESSION:  Thrombocytosis etiology for this is not obvious. The differential diagnosis includes, essential thrombocytosis, polycythemia, reactive thrombocytosis due to an underlying chronic inflammatory condition and iron deficiency. Given the acute nature of her neurologic event, it would be best to lower her platelet count with hydroxyurea at this time, aiming for a platelet count under 600,000. I will also check JAK2 mutation and CALR testing and an iron panel. We will check daily CBCs and follow with you on behalf of Derrick Walsh. Azeb Castano MD 
 
 
[id number] 
D:  06/14/2019 15:00 
T:  06/14/2019 15:11 
JOB #:  [id number]

## 2019-06-15 NOTE — CONSULTS
Neurology Progress Note Patient ID: Malgorzata Torres [id number] 
71 y.o. 
1955 Chief Complaint: Stroke Subjective:  
 
60-year-old woman with new right MCA infarct in the setting of severe intracranial atherosclerosis admitted for difficulty with speech. She is non-English-speaking and blue phone and is of no assistance given her dialect. No acute events overnight and she is improving. Family reporting she is speaking words about her baseline. She was on Plavix and aspirin prior to admission. Blood work shows therapeutic levels. Not a candidate for intervention. Objective:  
 
ROS: 
Cannot obtain secondary to patient medical condition Language Meds: 
Current Facility-Administered Medications Medication Dose Route Frequency  levETIRAcetam (KEPPRA) 500 mg in 0.9% sodium chloride 100 mL IVPB  500 mg IntraVENous Q12H  hydroxyurea (HYDREA) chemo cap 500 mg  500 mg Oral DAILY  0.9% sodium chloride infusion  125 mL/hr IntraVENous CONTINUOUS  
 amLODIPine (NORVASC) tablet 5 mg  5 mg Oral DAILY  aspirin delayed-release tablet 81 mg  81 mg Oral DAILY  atorvastatin (LIPITOR) tablet 10 mg  10 mg Oral DAILY  clopidogrel (PLAVIX) tablet 75 mg  75 mg Oral DAILY  insulin glargine (LANTUS) injection 20 Units  20 Units SubCUTAneous DAILY  [Held by provider] levETIRAcetam (KEPPRA) tablet 500 mg  500 mg Oral BID  lisinopril (PRINIVIL, ZESTRIL) tablet 20 mg  20 mg Oral DAILY  sodium chloride (NS) flush 5-40 mL  5-40 mL IntraVENous Q8H  
 sodium chloride (NS) flush 5-40 mL  5-40 mL IntraVENous PRN  
 acetaminophen (TYLENOL) tablet 650 mg  650 mg Oral Q4H PRN Or  
 acetaminophen (TYLENOL) solution 650 mg  650 mg Per NG tube Q4H PRN Or  
 acetaminophen (TYLENOL) suppository 650 mg  650 mg Rectal Q4H PRN  
 labetalol (NORMODYNE;TRANDATE) 20 mg/4 mL (5 mg/mL) injection 5 mg  5 mg IntraVENous Q10MIN PRN  
 enoxaparin (LOVENOX) injection 40 mg  40 mg SubCUTAneous Q24H  hydroCHLOROthiazide (HYDRODIURIL) tablet 25 mg  25 mg Oral DAILY MRI Results (maximum last 3): Results from Hospital Encounter encounter on 06/13/19 MRI BRAIN WO CONT Narrative EXAM:  MRI BRAIN WO CONT INDICATION:  Syncope TECHNIQUE: Sagittal T1, axial FLAIR, T2,T1 and gradient echo images as well as 
coronal T2 weighted images and axial diffusion weighted images of the head were 
obtained. COMPARISON:  CTA head and neck 6/13/19, MRI head 1/28/19 FINDINGS: 
Abnormal restricted diffusion consistent with acute infarction in the extreme 
posterior periventricular coronal radiata along the right anterior lateral 
aspect of the atria of the lateral ventricle. Some mixed signal within the encephalomalacia in the right posterior superior 
temporal parietal lobe is probably artifactual and not necessarily new acute 
infarction. No other areas of abnormal restricted diffusion/acute infarction demonstrated. Chronic hemosiderin and site of right parietal encephalomalacia old infarct. This extends posteriorly into the lateral occipital lobe. Generalized prominence ventricles and sulci consistent with cerebral volume 
loss. Multifocal and confluent areas of T2 hyperintensity cerebral white matter and 
pattern suggesting chronic small vessel ischemia. Small focal signal abnormality in the left posterior limb internal capsule at 
site of previous small lacunar infarct demonstrated 1/28/19. No evidence of acute intracranial hemorrhage, mass or abnormal extra-axial fluid 
collections. Impression IMPRESSION: Aspect of the 
1. Acute small right extreme posterior corona radiata infarct along the anterior 
lateral aspect of the right atria of the lateral ventricle. 2. Chronic findings of encephalomalacia, volume loss and chronic small vessel 
ischemic changes again noted. Results from Hospital Encounter encounter on 01/27/19 MRA BRAIN WO CONT Narrative *PRELIMINARY REPORT* No acute abnormality. Preliminary report was provided by Dr. Shanti Triana, the on-call radiologist, at [facility] 227 
hours. Final report to follow. *END PRELIMINARY REPORT* EXAM: MRA BRAIN WO CONT INDICATION:   stroke COMPARISON:  MRI brain 1/28/2019. CONTRAST:  None. TECHNIQUE:   
3-D time-of-flight noncontrast MRA of the brain was performed. Multiplanar and 
MIP reconstructions were obtained. FINDINGS: 
There is no evidence of large vessel occlusion or flow-limiting stenosis of the 
intracranial internal carotid, anterior cerebral, and middle cerebral arteries. Moderate focal stenosis in the proximal right M2 posterior division. Prominent 
infundibulum at the origin of the right ophthalmic artery. The anterior 
communicating artery is patent. There is no evidence of large vessel occlusion of the intracranial vertebral 
arteries, basilar artery, or posterior cerebral arteries. Severe focal stenosis 
in the proximal right P2 segment. The posterior communicating arteries are 
patent. There is no evidence of aneurysm or vascular malformation. Impression IMPRESSION:   
1. No evidence of large vessel occlusion. 2. Severe focal stenosis in the proximal right P2 segment. 3. Moderate focal stenosis in the proximal right M2 posterior division. MRI BRAIN WO CONT Narrative *PRELIMINARY REPORT* Small left thalamic lacunar infarct. Old right parietal infarct. Preliminary report was provided by Dr. Shanti Triana, the on-call radiologist, at [facility] [address] 
hours. Final report to follow. *END PRELIMINARY REPORT* EXAM:  MRI BRAIN WO CONT INDICATION:    AMS/ SLURRED SPEECH. EVALUATE FOR STROKE 
 
COMPARISON:  MRI brain 5/7/2018, CT head 1/27/2019. Jalencarey Lopez CONTRAST: None. TECHNIQUE:   
Multiplanar multisequence acquisition without contrast of the brain. FINDINGS: 
Small acute infarct in the posterior limb of the left internal capsule.  
 
Generalized parenchymal volume loss with commensurate dilation of the sulci and 
ventricular system. Periventricular deep white matter T2/FLAIR hyperintensities, 
consistent with mild chronic microvascular ischemic disease. Large chronic 
infarct in the right parieto-occipital lobe is unchanged. Small chronic infarct 
in the right basal ganglia. There is no cerebellar tonsillar herniation. Expected arterial flow-voids are present. Circumferential mucosal thickening in the left maxillary sinus with frothy 
secretions, as well as scattered mucosal thickening in the left ethmoidal air 
cells. The mastoid air cells and middle ears are clear. The orbital contents are 
within normal limits. No significant osseous or scalp lesions are identified. Impression IMPRESSION:  
1. Small acute infarct in the posterior limb of the left internal capsule. 2. Generalized parenchymal volume loss with mild chronic microvascular ischemic 
disease. Large chronic infarct in the right parieto-occipital lobe. 3. Circumferential mucosal thickening in the left maxillary sinus with frothy 
secretions. Correlate clinically for acute sinusitis. 23X Lab Review Recent Results (from the past 24 hour(s)) LACTIC ACID Collection Time: 06/14/19  3:17 PM  
Result Value Ref Range Lactic acid 2.7 (HH) 0.4 - 2.0 MMOL/L  
IRON PROFILE Collection Time: 06/14/19  3:17 PM  
Result Value Ref Range Iron 37 35 - 150 ug/dL TIBC 257 250 - 450 ug/dL Iron % saturation 14 (L) 20 - 50 % GLUCOSE, POC Collection Time: 06/14/19 10:09 PM  
Result Value Ref Range Glucose (POC) 146 (H) 65 - 100 mg/dL Performed by Patt Lee PLATELET FUNCTION, VERIFY NOW P2Y12 Collection Time: 06/15/19  2:42 AM  
Result Value Ref Range P2Y12 Plt response 111 (L) 194 - 418 PRU ASPIRIN TEST Collection Time: 06/15/19  2:42 AM  
Result Value Ref Range Aspirin test 490 ARU  
CBC W/O DIFF Collection Time: 06/15/19  3:03 AM  
Result Value Ref Range WBC 6.5 3.6 - 11.0 K/uL RBC 4.93 3.80 - 5.20 M/uL  
 HGB 14.1 11.5 - 16.0 g/dL HCT 43.4 35.0 - 47.0 % MCV 88.0 80.0 - 99.0 FL  
 MCH 28.6 26.0 - 34.0 PG  
 MCHC 32.5 30.0 - 36.5 g/dL  
 RDW 12.1 11.5 - 14.5 % PLATELET 585 (H) 836 - 400 K/uL MPV 10.6 8.9 - 12.9 FL  
 NRBC 0.0 0  WBC ABSOLUTE NRBC 0.00 0.00 - 0.01 K/uL LACTIC ACID Collection Time: 06/15/19  3:03 AM  
Result Value Ref Range Lactic acid 0.9 0.4 - 2.0 MMOL/L Additional comments:I reviewed the patient's new clinical lab test results. and I reviewed the patients new imaging test results. Patient Vitals for the past 8 hrs: 
 BP Temp Pulse Resp SpO2 Weight  
06/15/19 1000 142/82 96.2 °F (35.7 °C) 84 17 96 % 58 kg (127 lb 13.9 oz) 06/15/19 0912 142/82  [address]  
 
 
06/15 0701 - 06/15 1900 In: 360 [P.O.:360] Out: -  
06/13 1901 - 06/15 0700 In: 450 [I.V.:450] Out: 160 [Urine:160] Exam: 
Visit Vitals /82 (BP 1 Location: Left arm, BP Patient Position: During activity; Sitting) Pulse 84 Temp 96.2 °F (35.7 °C) Resp 17 Wt 58 kg (127 lb 13.9 oz) SpO2 96% Breastfeeding? No  
BMI 21.28 kg/m² Gen: Well developed CV: RRR Lungs: non labored breathing Abd: soft, non distended Neuro: Awake and alert. Standing transferring from toilet to bed. Needs some assistance but can stand on her own power independently and steady. CN II-XII: PERRL,, face appears grossly symmetric, could not assess tongue due to the language barrier Motor: She is moving all extremities spontaneously. Some subtle left-sided weakness is present. Sensory: Unreliable Gait: Simple steps with assistance PROBLEM LIST:  
 
Patient Active Problem List  
Diagnosis Code  Diabetes mellitus type 2, controlled ([facility] E11.9  Illiteracy Z55.0  Dyspepsia R10.13  
 Osteoarthritis of right knee M17.11  
 Essential hypertension with goal blood pressure less than 140/90 I10  Seizure ([facility] R56.9  Type 2 diabetes with nephropathy (HCC) E11.21  
  Slurred speech R47.81  
 Altered mental status R41.82  Difficulty walking R26.2 Assessment/Plan:   
 
65-year-old woman with multiple risk factors for stroke with history of stroke and post stroke epilepsy who seems to be approaching her baseline today. Antiplatelet levels are appropriate therapeutically. No change to aspirin and Plavix. Aggressive control of stroke risk factors. Continue Keppra. Avoid hypotension. She is at risk for poor cerebral perfusion given the severe intracranial atherosclerosis noted. Rehab needs if any. Please call if needed. We will sign off.  
 
 
Signed: 
[address] [facility] [place], DO 
6/15/2019 
10:56 AM

## 2019-06-16 LAB
ANION GAP SERPL CALC-SCNC: 5 MMOL/L (ref 5–15)
BASOPHILS # BLD: 0 K/UL (ref 0–0.1)
BASOPHILS NFR BLD: 0 % (ref 0–1)
BUN SERPL-MCNC: 14 MG/DL (ref 6–20)
BUN/CREAT SERPL: 13 (ref 12–20)
CALCIUM SERPL-MCNC: 8.6 MG/DL (ref 8.5–10.1)
CHLORIDE SERPL-SCNC: 111 MMOL/L (ref 97–108)
CO2 SERPL-SCNC: 27 MMOL/L (ref 21–32)
CREAT SERPL-MCNC: 1.12 MG/DL (ref 0.55–1.02)
DIFFERENTIAL METHOD BLD: ABNORMAL
EOSINOPHIL # BLD: 0.1 K/UL (ref 0–0.4)
EOSINOPHIL NFR BLD: 1 % (ref 0–7)
ERYTHROCYTE [DISTWIDTH] IN BLOOD BY AUTOMATED COUNT: 12.2 % (ref 11.5–14.5)
GLUCOSE SERPL-MCNC: 111 MG/DL (ref 65–100)
HCT VFR BLD AUTO: 41.4 % (ref 35–47)
HGB BLD-MCNC: 13.5 G/DL (ref 11.5–16)
IMM GRANULOCYTES # BLD AUTO: 0 K/UL (ref 0–0.04)
IMM GRANULOCYTES NFR BLD AUTO: 0 % (ref 0–0.5)
LACTATE SERPL-SCNC: 0.5 MMOL/L (ref 0.4–2)
LYMPHOCYTES # BLD: 1.7 K/UL (ref 0.8–3.5)
LYMPHOCYTES NFR BLD: 27 % (ref 12–49)
MCH RBC QN AUTO: 29.2 PG (ref 26–34)
MCHC RBC AUTO-ENTMCNC: 32.6 G/DL (ref 30–36.5)
MCV RBC AUTO: 89.6 FL (ref 80–99)
MONOCYTES # BLD: 0.5 K/UL (ref 0–1)
MONOCYTES NFR BLD: 9 % (ref 5–13)
NEUTS SEG # BLD: 4 K/UL (ref 1.8–8)
NEUTS SEG NFR BLD: 63 % (ref 32–75)
NRBC # BLD: 0 K/UL (ref 0–0.01)
NRBC BLD-RTO: 0 PER 100 WBC
PLATELET # BLD AUTO: 593 K/UL (ref 150–400)
PMV BLD AUTO: 10 FL (ref 8.9–12.9)
POTASSIUM SERPL-SCNC: 3.9 MMOL/L (ref 3.5–5.1)
RBC # BLD AUTO: 4.62 M/UL (ref 3.8–5.2)
SODIUM SERPL-SCNC: 143 MMOL/L (ref 136–145)
WBC # BLD AUTO: 6.4 K/UL (ref 3.6–11)

## 2019-06-16 PROCEDURE — 74011250636 HC RX REV CODE- 250/636: Performed by: HOSPITALIST

## 2019-06-16 PROCEDURE — 85025 COMPLETE CBC W/AUTO DIFF WBC: CPT

## 2019-06-16 PROCEDURE — 83605 ASSAY OF LACTIC ACID: CPT

## 2019-06-16 PROCEDURE — 74011250637 HC RX REV CODE- 250/637: Performed by: HOSPITALIST

## 2019-06-16 PROCEDURE — 36415 COLL VENOUS BLD VENIPUNCTURE: CPT

## 2019-06-16 PROCEDURE — 74011250636 HC RX REV CODE- 250/636: Performed by: NURSE PRACTITIONER

## 2019-06-16 PROCEDURE — 99218 HC RM OBSERVATION: CPT

## 2019-06-16 PROCEDURE — 80048 BASIC METABOLIC PNL TOTAL CA: CPT

## 2019-06-16 PROCEDURE — 74011250637 HC RX REV CODE- 250/637: Performed by: FAMILY MEDICINE

## 2019-06-16 PROCEDURE — 74011250636 HC RX REV CODE- 250/636: Performed by: INTERNAL MEDICINE

## 2019-06-16 PROCEDURE — 74011000258 HC RX REV CODE- 258: Performed by: NURSE PRACTITIONER

## 2019-06-16 PROCEDURE — 74011636637 HC RX REV CODE- 636/637: Performed by: HOSPITALIST

## 2019-06-16 RX ORDER — HYDRALAZINE HYDROCHLORIDE 20 MG/ML
10 INJECTION INTRAMUSCULAR; INTRAVENOUS
Status: DISCONTINUED | OUTPATIENT
Start: 2019-06-16 | End: 2019-06-18 | Stop reason: HOSPADM

## 2019-06-16 RX ORDER — LISINOPRIL 20 MG/1
40 TABLET ORAL DAILY
Status: DISCONTINUED | OUTPATIENT
Start: 2019-06-16 | End: 2019-06-18 | Stop reason: HOSPADM

## 2019-06-16 RX ADMIN — SODIUM CHLORIDE 500 MG: 900 INJECTION, SOLUTION INTRAVENOUS at 06:16

## 2019-06-16 RX ADMIN — Medication 10 ML: at 21:57

## 2019-06-16 RX ADMIN — HYDROXYUREA 500 MG: 500 CAPSULE ORAL at 09:29

## 2019-06-16 RX ADMIN — SODIUM CHLORIDE 500 MG: 900 INJECTION, SOLUTION INTRAVENOUS at 16:32

## 2019-06-16 RX ADMIN — AMLODIPINE BESYLATE 5 MG: 5 TABLET ORAL at 09:28

## 2019-06-16 RX ADMIN — HYDROCHLOROTHIAZIDE 25 MG: 25 TABLET ORAL at 09:29

## 2019-06-16 RX ADMIN — LISINOPRIL 40 MG: 20 TABLET ORAL at 09:26

## 2019-06-16 RX ADMIN — ENOXAPARIN SODIUM 40 MG: 40 INJECTION SUBCUTANEOUS at 21:57

## 2019-06-16 RX ADMIN — Medication 10 ML: at 13:57

## 2019-06-16 RX ADMIN — Medication 10 ML: at 06:16

## 2019-06-16 RX ADMIN — INSULIN GLARGINE 20 UNITS: 100 INJECTION, SOLUTION SUBCUTANEOUS at 09:25

## 2019-06-16 RX ADMIN — ATORVASTATIN CALCIUM 10 MG: 10 TABLET, FILM COATED ORAL at 09:29

## 2019-06-16 RX ADMIN — CLOPIDOGREL BISULFATE 75 MG: 75 TABLET ORAL at 09:28

## 2019-06-16 RX ADMIN — ASPIRIN 81 MG: 81 TABLET ORAL at 09:29

## 2019-06-16 NOTE — PROGRESS NOTES
-Hematology / Oncology ([facility]) - 
-Primary Oncologist-  
-CC- 
 
-S-  No events 
 
-O-  
  
Patient Vitals for the past 24 hrs: 
 Temp Pulse Resp BP SpO2  
06/16/19 1000 98 °F (36.7 °C) 78 19 158/75 97 % 06/16/19 0925  76  158/75   
06/16/19 0600 98.1 °F (36.7 °C) 75 22 174/70 94 % 06/16/19 0200 98 °F (36.7 °C) 67 19 144/69 95 % 06/15/19 2130 98.1 °F (36.7 °C) 77 22 170/65 97 % 06/15/19 1800 98.4 °F (36.9 °C) 81 21 159/84 96 % 06/15/19 1400 97.9 °F (36.6 °C) 81 22 147/70 98 % 06/16 0701 - 06/16 1900 In: 300 [P.O.:300] Out: -  
Gen: nad Chest: bilateral breath sounds present Cardiac: rrr Abd: s/nt 
 
-Labs-   
Recent Labs  
  06/16/19 
0349 06/15/19 
0303 06/13/19 
1426 WBC 6.4 6.5 11.0 HGB 13.5 14.1 13.7 * 455* 707* ANEU 4.0  --  5.3 INR  --   --  1.1 APTT  --   --  28.8   --  139  
K 3.9  --  3.3*  
*  --  150* BUN 14  --  16  
CREA 1.12*  --  1.08* ALT  --   --  25 SGOT  --   --  23  
TBILI  --   --  0.3 AP  --   --  87  
CA 8.6  --  8.8  
 
 
-Imaging-  
 
 
-Assessment + Plan-    
*) Thrombocytosis JAK2 pending On hydrea PLT count down to 593k Continue hydrea for now at same dosing *)  No new rec's 
 following

## 2019-06-16 NOTE — PROGRESS NOTES
Hospitalist Progress Note Ivy Braun MD 
Answering service: [phone] OR [phone] from in house phone Date of Service:  2019 NAME:  Momo Sanchez :  1955 MRN:  [id number] Admission Summary:  
71 y.o. female with past medical history significant for mca stroke, aphasia, dm, htn, who presents from ems with chief complaint of syncope. Pt was LKW at 1:30 pm at her pcp's office for a routine following up. After the check up. Her son-in-law is driving her home. On the way to home, patient had syncope episode, LOC about 1-2 mins. Son-in-law pulled the car over and call EMS. Family said her left sided weakness and left facial droop was chronic. EMS arrived on scene to report that pt was vomiting and non-verbal.  
Currently patient is awake. Interval history / Subjective:  
 
Seen by neuro and oncology for thrombocytosis and stroke ASA/ plavix activity WNL 
PLT count 500 after given hydroxyurea She will need pt/ot eval and need rehab D/w family at bedside Assessment & Plan: 1. New CVA with seizure: - Tele monitor in neuro she has a new infract on asa / plavix  
- P2Y12 AND ASA TEST WNL 
- repeat brain MRI 1. Acute small right extreme posterior corona radiata infarct along the anterior 
lateral aspect of the right atria of the lateral ventricle. 2. Chronic findings of encephalomalacia, volume loss and chronic small vessel 
ischemic changes again noted. - EEG, will increase keppra to 500mg bid - Reviewed EEG Abnormal EEG due to left temporal sharp waves 2. Severe stenosis of right MCA: per neuro IR, no acute thrombosis, no intervention for now. 3. CKD 3 stable 4. Thrombocytosis: ? Essential thrombocytosis,  
- W/U per hematology  On hydrea 
- started on hydroxyurea plt count 500k 
 
5: IDDM: resume lantus, SSI, will follow bg 
 
6. HTN: continue home meds Code status: Full DVT prophylaxis: SCD Care Plan discussed with: Patient/Family and Nurse Disposition: TBD Hospital Problems  Date Reviewed: 1/27/2019 Codes Class Noted POA Altered mental status ICD-10-CM: R41.82 
ICD-9-CM: 780.97  1/27/2019 Unknown Review of Systems: A comprehensive review of systems was negative. Vital Signs:  
 Last 24hrs VS reviewed since prior progress note. Most recent are: 
Visit Vitals /70 (BP 1 Location: Right arm, BP Patient Position: At rest) Pulse 75 Temp 98.1 °F (36.7 °C) Resp 22 Wt 51.2 kg (112 lb 14 oz) SpO2 94% Breastfeeding? No  
BMI 18.78 kg/m² Intake/Output Summary (Last 24 hours) at 6/16/2019 0751 Last data filed at 6/15/2019 2317 Gross per 24 hour Intake 360 ml Output 175 ml Net 185 ml Physical Examination:  
 
 
     
Constitutional:  No acute distress, cooperative, ENT:  Oral mucous moist, Resp:  CTA bilaterally. CV:  Regular rhythm, normal rate GI:  Soft, non distended, non tender. BS+ Musculoskeletal:  No edema, warm, 2+ pulses throughout Neurologic:  Moves all extremities. AAOx1 Data Review:  
 I personally reviewed  Image and labs Mri Brain Wo Cont Result Date: 6/14/2019 IMPRESSION: Aspect of the 1. Acute small right extreme posterior corona radiata infarct along the anterior lateral aspect of the right atria of the lateral ventricle. 2. Chronic findings of encephalomalacia, volume loss and chronic small vessel ischemic changes again noted. Xr Chest [facility] Result Date: 6/13/2019 IMPRESSION: Central congestion. Small right pleural effusion. Cta Code Neuro Head And Neck W Cont Result Date: 6/13/2019 IMPRESSION: 1. Interval development of severe stenosis right middle cerebral artery origin since 1/28/19. 2. No change in severe right middle cerebral artery posterior division M2 branch origin.  3. Multifocal/diffuse intracranial small/medium cerebral arterial atherosclerosis. 4. Again noted severe focal stenosis right posterior cerebral artery P2 segment. Ct Code Neuro Head Wo Contrast 
 
Result Date: 6/13/2019 IMPRESSION: 1. No acute finding is seen. 2. Remainder of the findings described above similar to the previous examination. Ct Code Neuro Perf W Cbf Result Date: 6/13/2019 IMPRESSION: 1. Expected abnormal perfusion/flow in area of old infarction. 2. No definite other perfusion/flow abnormality demonstrated. Labs:  
 
Recent Labs  
  06/16/19 
0349 06/15/19 
0303 WBC 6.4 6.5 HGB 13.5 14.1 HCT 41.4 43.4 * 455* Recent Labs  
  06/16/19 
0349 06/13/19 
1426 06/13/19 
1207  139 138  
K 3.9 3.3* 4.5  
* 108 103 CO2 27 24 20 BUN 14 16 15 CREA 1.12* 1.08* 0.99 * 150* 170* CA 8.6 8.8 9.4 Recent Labs  
  06/13/19 
1426 SGOT 23 ALT 25 AP 87 TBILI 0.3 TP 7.8 ALB 3.6 GLOB 4.2* Recent Labs  
  06/13/19 
1426 INR 1.1 PTP 10.8 APTT 28.8 Recent Labs  
  06/14/19 5 TIBC 257 PSAT 14* No results found for: FOL, RBCF No results for input(s): PH, PCO2, PO2 in the last 72 hours. Recent Labs  
  06/13/19 
1426 TROIQ <0.05 Lab Results Component Value Date/Time Cholesterol, total 115 06/13/2019 02:26 PM  
 HDL Cholesterol 33 06/13/2019 02:26 PM  
 LDL,Direct 119 (H) 01/25/2013 03:10 PM  
 LDL, calculated 61.8 06/13/2019 02:26 PM  
 Triglyceride 101 06/13/2019 02:26 PM  
 CHOL/HDL Ratio 3.5 06/13/2019 02:26 PM  
 
Lab Results Component Value Date/Time Glucose (POC) 146 (H) 06/14/2019 10:09 PM  
 Glucose (POC) 239 (H) 02/01/2019 11:02 AM  
 Glucose (POC) 110 (H) 02/01/2019 06:14 AM  
 Glucose (POC) 173 (H) 01/31/2019 08:49 PM  
 Glucose (POC) 131 (H) 01/31/2019 04:35 PM  
 
Lab Results Component Value Date/Time  Color YELLOW/STRAW 06/13/2019 06:35 PM  
 Appearance CLEAR 06/13/2019 06:35 PM  
 Specific gravity 1.029 06/13/2019 06:35 PM  
 pH (UA) 6.0 06/13/2019 06:35 PM  
 Protein NEGATIVE  06/13/2019 06:35 PM  
 Glucose NEGATIVE  06/13/2019 06:35 PM  
 Ketone NEGATIVE  06/13/2019 06:35 PM  
 Bilirubin NEGATIVE  06/13/2019 06:35 PM  
 Urobilinogen 0.2 06/13/2019 06:35 PM  
 Nitrites NEGATIVE  06/13/2019 06:35 PM  
 Leukocyte Esterase NEGATIVE  06/13/2019 06:35 PM  
 Epithelial cells FEW 06/13/2019 06:35 PM  
 Bacteria NEGATIVE  06/13/2019 06:35 PM  
 WBC 0-4 06/13/2019 06:35 PM  
 RBC 0-5 06/13/2019 06:35 PM  
 
 
 
Medications Reviewed:  
 
Current Facility-Administered Medications Medication Dose Route Frequency  0.9% sodium chloride infusion  125 mL/hr IntraVENous CONTINUOUS  
 levETIRAcetam (KEPPRA) 500 mg in 0.9% sodium chloride 100 mL IVPB  500 mg IntraVENous Q12H  hydroxyurea (HYDREA) chemo cap 500 mg  500 mg Oral DAILY  amLODIPine (NORVASC) tablet 5 mg  5 mg Oral DAILY  aspirin delayed-release tablet 81 mg  81 mg Oral DAILY  atorvastatin (LIPITOR) tablet 10 mg  10 mg Oral DAILY  clopidogrel (PLAVIX) tablet 75 mg  75 mg Oral DAILY  insulin glargine (LANTUS) injection 20 Units  20 Units SubCUTAneous DAILY  [Held by provider] levETIRAcetam (KEPPRA) tablet 500 mg  500 mg Oral BID  lisinopril (PRINIVIL, ZESTRIL) tablet 20 mg  20 mg Oral DAILY  sodium chloride (NS) flush 5-40 mL  5-40 mL IntraVENous Q8H  
 sodium chloride (NS) flush 5-40 mL  5-40 mL IntraVENous PRN  
 acetaminophen (TYLENOL) tablet 650 mg  650 mg Oral Q4H PRN Or  
 acetaminophen (TYLENOL) solution 650 mg  650 mg Per NG tube Q4H PRN Or  
 acetaminophen (TYLENOL) suppository 650 mg  650 mg Rectal Q4H PRN  
 labetalol (NORMODYNE;TRANDATE) 20 mg/4 mL (5 mg/mL) injection 5 mg  5 mg IntraVENous Q10MIN PRN  
 enoxaparin (LOVENOX) injection 40 mg  40 mg SubCUTAneous Q24H  hydroCHLOROthiazide (HYDRODIURIL) tablet 25 mg  25 mg Oral DAILY ______________________________________________________________________ EXPECTED LENGTH OF STAY: - - - 
ACTUAL LENGTH OF STAY:          0 Leila Barnes MD

## 2019-06-16 NOTE — PROGRESS NOTES
Bedside and Verbal shift change report given to [facility] (oncoming nurse) by Joseph Ruff (offgoing nurse). Report included the following information SBAR, Kardex, Intake/Output, MAR, Recent Results and Cardiac Rhythm NSR.

## 2019-06-16 NOTE — PROGRESS NOTES
Problem: Seizure Disorder (Adult) Goal: *STG: Remains free of seizure activity Outcome: Progressing Towards Goal 
Goal: *STG: Maintains lab values within therapeutic range Outcome: Progressing Towards Goal 
Goal: *STG/LTG: Complies with medication therapy Outcome: Progressing Towards Goal 
Goal: *STG: Remains free of injury during seizure activity Outcome: Progressing Towards Goal 
Goal: *STG: Remains safe in hospital 
Outcome: Progressing Towards Goal 
Goal: Interventions Outcome: Progressing Towards Goal 
  
Problem: General Medical Care Plan Goal: *Vital signs within specified parameters Outcome: Progressing Towards Goal 
Goal: *Labs within defined limits Outcome: Progressing Towards Goal 
Goal: *Absence of infection signs and symptoms Outcome: Progressing Towards Goal 
Goal: *Optimal pain control at patient's stated goal 
Outcome: Progressing Towards Goal 
Goal: *Skin integrity maintained Outcome: Progressing Towards Goal 
Goal: *Fluid volume balance Outcome: Progressing Towards Goal 
Goal: *Optimize nutritional status Outcome: Progressing Towards Goal 
Goal: *Anxiety reduced or absent Outcome: Progressing Towards Goal 
Goal: *Progressive mobility and function (eg: ADL's) Outcome: Progressing Towards Goal 
  
Problem: Falls - Risk of 
Goal: *Absence of Falls Description Document Jesus Gonzalez Fall Risk and appropriate interventions in the flowsheet. Outcome: Progressing Towards Goal 
  
Problem: Patient Education: Go to Patient Education Activity Goal: Patient/Family Education Outcome: Progressing Towards Goal 
  
Problem: Pressure Injury - Risk of 
Goal: *Prevention of pressure injury Description Document Terry Scale and appropriate interventions in the flowsheet. Outcome: Progressing Towards Goal 
  
Problem: Patient Education: Go to Patient Education Activity Goal: Patient/Family Education Outcome: Progressing Towards Goal 
  
Problem: Diabetes Self-Management Goal: *Disease process and treatment process Description Define diabetes and identify own type of diabetes; list 3 options for treating diabetes. Outcome: Progressing Towards Goal 
Goal: *Incorporating nutritional management into lifestyle Description Describe effect of type, amount and timing of food on blood glucose; list 3 methods for planning meals. Outcome: Progressing Towards Goal 
Goal: *Incorporating physical activity into lifestyle Description State effect of exercise on blood glucose levels. Outcome: Progressing Towards Goal 
Goal: *Developing strategies to promote health/change behavior Description Define the ABC's of diabetes; identify appropriate screenings, schedule and personal plan for screenings. Outcome: Progressing Towards Goal 
Goal: *Using medications safely Description State effect of diabetes medications on diabetes; name diabetes medication taking, action and side effects. Outcome: Progressing Towards Goal 
Goal: *Monitoring blood glucose, interpreting and using results Description Identify recommended blood glucose targets  and personal targets. Outcome: Progressing Towards Goal 
Goal: *Prevention, detection, treatment of acute complications Description List symptoms of hyper- and hypoglycemia; describe how to treat low blood sugar and actions for lowering  high blood glucose level. Outcome: Progressing Towards Goal 
Goal: *Prevention, detection and treatment of chronic complications Description Define the natural course of diabetes and describe the relationship of blood glucose levels to long term complications of diabetes. Outcome: Progressing Towards Goal 
Goal: *Developing strategies to address psychosocial issues Description Describe feelings about living with diabetes; identify support needed and support network Outcome: Progressing Towards Goal 
Goal: *Sick day guidelines Outcome: Progressing Towards Goal 
  
 Problem: Patient Education: Go to Patient Education Activity Goal: Patient/Family Education Outcome: Progressing Towards Goal 
  
Problem: Patient Education: Go to Patient Education Activity Goal: Patient/Family Education Outcome: Progressing Towards Goal 
  
Problem: Patient Education: Go to Patient Education Activity Goal: Patient/Family Education Outcome: Progressing Towards Goal 
  
Problem: TIA/CVA Stroke: Day 2 Until Discharge Goal: Activity/Safety Outcome: Progressing Towards Goal 
Goal: Diagnostic Test/Procedures Outcome: Progressing Towards Goal 
Goal: Nutrition/Diet Outcome: Progressing Towards Goal 
Goal: Discharge Planning Outcome: Progressing Towards Goal 
Goal: Medications Outcome: Progressing Towards Goal 
Goal: Respiratory Outcome: Progressing Towards Goal 
Goal: Treatments/Interventions/Procedures Outcome: Progressing Towards Goal 
Goal: Psychosocial 
Outcome: Progressing Towards Goal 
Goal: *Verbalizes anxiety and depression are reduced or absent Outcome: Progressing Towards Goal 
Goal: *Absence of aspiration Outcome: Progressing Towards Goal 
Goal: *Absence of deep venous thrombosis signs and symptoms(Stroke Metric) Outcome: Progressing Towards Goal 
Goal: *Optimal pain control at patient's stated goal 
Outcome: Progressing Towards Goal 
Goal: *Tolerating diet Outcome: Progressing Towards Goal 
Goal: *Ability to perform ADLs and demonstrates progressive mobility and function Outcome: Progressing Towards Goal 
Goal: *Stroke education continued(Stroke Metric) Outcome: Progressing Towards Goal 
  
Problem: Ischemic Stroke: Discharge Outcomes Goal: *Verbalizes anxiety and depression are reduced or absent Outcome: Progressing Towards Goal 
Goal: *Verbalize understanding of risk factor modification(Stroke Metric) Outcome: Progressing Towards Goal 
Goal: *Hemodynamically stable Outcome: Progressing Towards Goal 
Goal: *Absence of aspiration pneumonia Outcome: Progressing Towards Goal 
Goal: *Aware of needed dietary changes Outcome: Progressing Towards Goal 
Goal: *Verbalize understanding of prescribed medications including anti-coagulants, anti-lipid, and/or anti-platelets(Stroke Metric) Outcome: Progressing Towards Goal 
Goal: *Tolerating diet Outcome: Progressing Towards Goal 
Goal: *Aware of follow-up diagnostics related to anticoagulants Outcome: Progressing Towards Goal 
Goal: *Ability to perform ADLs and demonstrates progressive mobility and function Outcome: Progressing Towards Goal 
Goal: *Absence of DVT(Stroke Metric) Outcome: Progressing Towards Goal 
Goal: *Absence of aspiration Outcome: Progressing Towards Goal 
Goal: *Optimal pain control at patient's stated goal 
Outcome: Progressing Towards Goal 
Goal: *Home safety concerns addressed Outcome: Progressing Towards Goal 
Goal: *Describes available resources and support systems Outcome: Progressing Towards Goal 
Goal: *Verbalizes understanding of activation of EMS(911) for stroke symptoms(Stroke Metric) Outcome: Progressing Towards Goal 
Goal: *Understands and describes signs and symptoms to report to providers(Stroke Metric) Outcome: Progressing Towards Goal 
Goal: *Neurolgocially stable (absence of additional neurological deficits) Outcome: Progressing Towards Goal 
Goal: *Verbalizes importance of follow-up with primary care physician(Stroke Metric) Outcome: Progressing Towards Goal 
Goal: *Smoking cessation discussed,if applicable(Stroke Metric) Outcome: Progressing Towards Goal 
Goal: *Depression screening completed(Stroke Metric) Outcome: Progressing Towards Goal

## 2019-06-17 PROBLEM — G45.9 TIA (TRANSIENT ISCHEMIC ATTACK): Status: ACTIVE | Noted: 2019-06-17

## 2019-06-17 LAB
ANION GAP SERPL CALC-SCNC: 8 MMOL/L (ref 5–15)
BUN SERPL-MCNC: 12 MG/DL (ref 6–20)
BUN/CREAT SERPL: 12 (ref 12–20)
CALCIUM SERPL-MCNC: 9.2 MG/DL (ref 8.5–10.1)
CHLORIDE SERPL-SCNC: 106 MMOL/L (ref 97–108)
CO2 SERPL-SCNC: 27 MMOL/L (ref 21–32)
CREAT SERPL-MCNC: 1.03 MG/DL (ref 0.55–1.02)
ERYTHROCYTE [DISTWIDTH] IN BLOOD BY AUTOMATED COUNT: 12.1 % (ref 11.5–14.5)
GLUCOSE SERPL-MCNC: 109 MG/DL (ref 65–100)
HCT VFR BLD AUTO: 42.6 % (ref 35–47)
HGB BLD-MCNC: 14.1 G/DL (ref 11.5–16)
MCH RBC QN AUTO: 29.2 PG (ref 26–34)
MCHC RBC AUTO-ENTMCNC: 33.1 G/DL (ref 30–36.5)
MCV RBC AUTO: 88.2 FL (ref 80–99)
NRBC # BLD: 0 K/UL (ref 0–0.01)
NRBC BLD-RTO: 0 PER 100 WBC
PLATELET # BLD AUTO: 606 K/UL (ref 150–400)
PMV BLD AUTO: 9.9 FL (ref 8.9–12.9)
POTASSIUM SERPL-SCNC: 3.9 MMOL/L (ref 3.5–5.1)
RBC # BLD AUTO: 4.83 M/UL (ref 3.8–5.2)
SODIUM SERPL-SCNC: 141 MMOL/L (ref 136–145)
WBC # BLD AUTO: 8.1 K/UL (ref 3.6–11)

## 2019-06-17 PROCEDURE — 92523 SPEECH SOUND LANG COMPREHEN: CPT | Performed by: SPEECH-LANGUAGE PATHOLOGIST

## 2019-06-17 PROCEDURE — 80048 BASIC METABOLIC PNL TOTAL CA: CPT

## 2019-06-17 PROCEDURE — 74011000258 HC RX REV CODE- 258: Performed by: NURSE PRACTITIONER

## 2019-06-17 PROCEDURE — 74011250637 HC RX REV CODE- 250/637: Performed by: HOSPITALIST

## 2019-06-17 PROCEDURE — 36415 COLL VENOUS BLD VENIPUNCTURE: CPT

## 2019-06-17 PROCEDURE — 92526 ORAL FUNCTION THERAPY: CPT | Performed by: SPEECH-LANGUAGE PATHOLOGIST

## 2019-06-17 PROCEDURE — 74011250637 HC RX REV CODE- 250/637: Performed by: FAMILY MEDICINE

## 2019-06-17 PROCEDURE — 74011250636 HC RX REV CODE- 250/636: Performed by: NURSE PRACTITIONER

## 2019-06-17 PROCEDURE — 74011250636 HC RX REV CODE- 250/636: Performed by: INTERNAL MEDICINE

## 2019-06-17 PROCEDURE — 99218 HC RM OBSERVATION: CPT

## 2019-06-17 PROCEDURE — 74011636637 HC RX REV CODE- 636/637: Performed by: HOSPITALIST

## 2019-06-17 PROCEDURE — 74011250636 HC RX REV CODE- 250/636: Performed by: HOSPITALIST

## 2019-06-17 PROCEDURE — 65660000000 HC RM CCU STEPDOWN

## 2019-06-17 PROCEDURE — 85027 COMPLETE CBC AUTOMATED: CPT

## 2019-06-17 RX ORDER — HYDROXYUREA 500 MG/1
1000 CAPSULE ORAL DAILY
Status: DISCONTINUED | OUTPATIENT
Start: 2019-06-18 | End: 2019-06-18 | Stop reason: HOSPADM

## 2019-06-17 RX ADMIN — AMLODIPINE BESYLATE 5 MG: 5 TABLET ORAL at 09:22

## 2019-06-17 RX ADMIN — HYDROCHLOROTHIAZIDE 25 MG: 25 TABLET ORAL at 09:23

## 2019-06-17 RX ADMIN — INSULIN GLARGINE 20 UNITS: 100 INJECTION, SOLUTION SUBCUTANEOUS at 09:25

## 2019-06-17 RX ADMIN — Medication 10 ML: at 05:59

## 2019-06-17 RX ADMIN — ATORVASTATIN CALCIUM 10 MG: 10 TABLET, FILM COATED ORAL at 09:22

## 2019-06-17 RX ADMIN — HYDROXYUREA 500 MG: 500 CAPSULE ORAL at 09:24

## 2019-06-17 RX ADMIN — CLOPIDOGREL BISULFATE 75 MG: 75 TABLET ORAL at 09:23

## 2019-06-17 RX ADMIN — SODIUM CHLORIDE 500 MG: 900 INJECTION, SOLUTION INTRAVENOUS at 05:58

## 2019-06-17 RX ADMIN — Medication 10 ML: at 13:44

## 2019-06-17 RX ADMIN — Medication 10 ML: at 21:36

## 2019-06-17 RX ADMIN — LISINOPRIL 40 MG: 20 TABLET ORAL at 09:22

## 2019-06-17 RX ADMIN — ASPIRIN 81 MG: 81 TABLET ORAL at 09:23

## 2019-06-17 RX ADMIN — LEVETIRACETAM 500 MG: 500 TABLET ORAL at 17:09

## 2019-06-17 NOTE — PROGRESS NOTES
Physical Therapy: Defer Chart reviewed and attempted to see patient for PT session. Patient received in bed with granddaughter at bedside, reporting patient has just finished eating and would like to rest. Patient observed to shake head \"no\" when asked if she would like to get OOB to chair. Will continue to follow.  
 
Preet Zuniga, PT, DPT

## 2019-06-17 NOTE — PROGRESS NOTES
Hospitalist Progress Note Stuart Burton MD 
Answering service: [phone] OR [phone] from in house phone Date of Service:  2019 NAME:  Mateo Alves :  1955 MRN:  [id number] Admission Summary:  
71 y.o. female with past medical history significant for mca stroke, aphasia, dm, htn, who presents from ems with chief complaint of syncope. Pt was LKW at 1:30 pm at her pcp's office for a routine following up. After the check up. Her son-in-law is driving her home. On the way to home, patient had syncope episode, LOC about 1-2 mins. Son-in-law pulled the car over and call EMS. Family said her left sided weakness and left facial droop was chronic. EMS arrived on scene to report that pt was vomiting and non-verbal.  
Currently patient is awake. Interval history / Subjective:  
 
Seen by neuro and oncology for thrombocytosis and stroke ASA/ plavix activity WNL 
PLT count >600 after given hydroxyurea increased to 2 tab She will need pt/ot eval and may need rehab D/w family at bedside ( daughter ) Assessment & Plan: 1. New CVA with seizure: - Tele monitor in neuro she has a new infract on asa / plavix  
- P2Y12 AND ASA TEST WNL 
- repeat brain MRI 1. Acute small right extreme posterior corona radiata infarct along the anterior 
lateral aspect of the right atria of the lateral ventricle. 2. Chronic findings of encephalomalacia, volume loss and chronic small vessel 
ischemic changes again noted. - EEG, will increase keppra to 500mg bid - Reviewed EEG Abnormal EEG due to left temporal sharp waves 2. Severe stenosis of right MCA:  
per neuro IR, no acute thrombosis, no intervention for now. 3. CKD 3 stable 4.  Thrombocytosis: ? Essential thrombocytosis,  
- W/U per hematology OMAR 2 pending   
- started on hydroxyurea plt count 600 k 
 
5: IDDM: resume lant, SSI, will follow bg 
 
 6. HTN: continue home meds Code status: Full DVT prophylaxis: SCD Care Plan discussed with: Patient/Family and Nurse Disposition: TBD PT/OT eval and d/c plan tomorrow Hospital Problems  Date Reviewed: 1/27/2019 Codes Class Noted POA  
 TIA (transient ischemic attack) ICD-10-CM: G45.9 ICD-9-CM: 435.9  6/17/2019 Unknown Altered mental status ICD-10-CM: R41.82 
ICD-9-CM: 780.97  1/27/2019 Unknown Review of Systems: A comprehensive review of systems was negative. Vital Signs:  
 Last 24hrs VS reviewed since prior progress note. Most recent are: 
Visit Vitals /84 (BP 1 Location: Left arm, BP Patient Position: At rest) Pulse 89 Temp 96.9 °F (36.1 °C) Resp 24 Wt 47.6 kg (104 lb 15 oz) SpO2 96% Breastfeeding? No  
BMI 17.46 kg/m² Intake/Output Summary (Last 24 hours) at 6/17/2019 1041 Last data filed at 6/16/2019 1200 Gross per 24 hour Intake 240 ml Output  Net 240 ml Physical Examination:  
 
 
     
Constitutional:  No acute distress, cooperative, ENT:  Oral mucous moist, Resp:  CTA bilaterally. CV:  Regular rhythm, normal rate GI:  Soft, non distended, non tender. BS+ Musculoskeletal:  No edema, warm, 2+ pulses throughout Neurologic:  Moves all extremities. AAOx1 Data Review:  
 I personally reviewed  Image and labs Mri Brain Wo Cont Result Date: 6/14/2019 IMPRESSION: Aspect of the 1. Acute small right extreme posterior corona radiata infarct along the anterior lateral aspect of the right atria of the lateral ventricle. 2. Chronic findings of encephalomalacia, volume loss and chronic small vessel ischemic changes again noted. Xr Chest [facility] Result Date: 6/13/2019 IMPRESSION: Central congestion. Small right pleural effusion. Cta Code Neuro Head And Neck W Cont Result Date: 6/13/2019 IMPRESSION: 1.  Interval development of severe stenosis right middle cerebral artery origin since 1/28/19. 2. No change in severe right middle cerebral artery posterior division M2 branch origin. 3. Multifocal/diffuse intracranial small/medium cerebral arterial atherosclerosis. 4. Again noted severe focal stenosis right posterior cerebral artery P2 segment. Ct Code Neuro Head Wo Contrast 
 
Result Date: 6/13/2019 IMPRESSION: 1. No acute finding is seen. 2. Remainder of the findings described above similar to the previous examination. Ct Code Neuro Perf W Cbf Result Date: 6/13/2019 IMPRESSION: 1. Expected abnormal perfusion/flow in area of old infarction. 2. No definite other perfusion/flow abnormality demonstrated. Labs:  
 
Recent Labs  
  06/17/19 0251 06/16/19 0349 WBC 8.1 6.4 HGB 14.1 13.5 HCT 42.6 41.4 * 593* Recent Labs  
  06/17/19 0251 06/16/19 0349  143  
K 3.9 3.9  111* CO2 27 27 BUN 12 14 CREA 1.03* 1.12* * 111* CA 9.2 8.6 No results for input(s): SGOT, GPT, ALT, AP, TBIL, TBILI, TP, ALB, GLOB, GGT, AML, LPSE in the last 72 hours. No lab exists for component: AMYP, HLPSE No results for input(s): INR, PTP, APTT in the last 72 hours. No lab exists for component: INREXT, INREXT Recent Labs  
  06/14/19 [address] TIBC 257 PSAT 14* No results found for: FOL, RBCF No results for input(s): PH, PCO2, PO2 in the last 72 hours. No results for input(s): CPK, CKNDX, TROIQ in the last 72 hours. No lab exists for component: CPKMB Lab Results Component Value Date/Time Cholesterol, total 115 06/13/2019 02:26 PM  
 HDL Cholesterol 33 06/13/2019 02:26 PM  
 LDL,Direct 119 (H) 01/25/2013 03:10 PM  
 LDL, calculated 61.8 06/13/2019 02:26 PM  
 Triglyceride 101 06/13/2019 02:26 PM  
 CHOL/HDL Ratio 3.5 06/13/2019 02:26 PM  
 
Lab Results Component Value Date/Time  Glucose (POC) 146 (H) 06/14/2019 10:09 PM  
 Glucose (POC) 239 (H) 02/01/2019 11:02 AM  
 Glucose (POC) 110 (H) 02/01/2019 06:14 AM  
 Glucose (POC) 173 (H) 01/31/2019 08:49 PM  
 Glucose (POC) 131 (H) 01/31/2019 04:35 PM  
 
Lab Results Component Value Date/Time Color YELLOW/STRAW 06/13/2019 06:35 PM  
 Appearance CLEAR 06/13/2019 06:35 PM  
 Specific gravity 1.029 06/13/2019 06:35 PM  
 pH (UA) 6.0 06/13/2019 06:35 PM  
 Protein NEGATIVE  06/13/2019 06:35 PM  
 Glucose NEGATIVE  06/13/2019 06:35 PM  
 Ketone NEGATIVE  06/13/2019 06:35 PM  
 Bilirubin NEGATIVE  06/13/2019 06:35 PM  
 Urobilinogen 0.2 06/13/2019 06:35 PM  
 Nitrites NEGATIVE  06/13/2019 06:35 PM  
 Leukocyte Esterase NEGATIVE  06/13/2019 06:35 PM  
 Epithelial cells FEW 06/13/2019 06:35 PM  
 Bacteria NEGATIVE  06/13/2019 06:35 PM  
 WBC 0-4 06/13/2019 06:35 PM  
 RBC 0-5 06/13/2019 06:35 PM  
 
 
 
Medications Reviewed:  
 
Current Facility-Administered Medications Medication Dose Route Frequency  [START ON 6/18/2019] hydroxyurea (HYDREA) chemo cap 1,000 mg  1,000 mg Oral DAILY  lisinopril (PRINIVIL, ZESTRIL) tablet 40 mg  40 mg Oral DAILY  hydrALAZINE (APRESOLINE) 20 mg/mL injection 10 mg  10 mg IntraVENous Q6H PRN  
 levETIRAcetam (KEPPRA) 500 mg in 0.9% sodium chloride 100 mL IVPB  500 mg IntraVENous Q12H  
 amLODIPine (NORVASC) tablet 5 mg  5 mg Oral DAILY  aspirin delayed-release tablet 81 mg  81 mg Oral DAILY  atorvastatin (LIPITOR) tablet 10 mg  10 mg Oral DAILY  clopidogrel (PLAVIX) tablet 75 mg  75 mg Oral DAILY  insulin glargine (LANTUS) injection 20 Units  20 Units SubCUTAneous DAILY  [Held by provider] levETIRAcetam (KEPPRA) tablet 500 mg  500 mg Oral BID  sodium chloride (NS) flush 5-40 mL  5-40 mL IntraVENous Q8H  
 sodium chloride (NS) flush 5-40 mL  5-40 mL IntraVENous PRN  
 acetaminophen (TYLENOL) tablet 650 mg  650 mg Oral Q4H PRN Or  
 acetaminophen (TYLENOL) solution 650 mg  650 mg Per NG tube Q4H PRN  Or  
  acetaminophen (TYLENOL) suppository 650 mg  650 mg Rectal Q4H PRN  
 labetalol (NORMODYNE;TRANDATE) 20 mg/4 mL (5 mg/mL) injection 5 mg  5 mg IntraVENous Q10MIN PRN  
 enoxaparin (LOVENOX) injection 40 mg  40 mg SubCUTAneous Q24H  hydroCHLOROthiazide (HYDRODIURIL) tablet 25 mg  25 mg Oral DAILY  
 
______________________________________________________________________ EXPECTED LENGTH OF STAY: - - - 
ACTUAL LENGTH OF STAY:          0 Oscar Ramirez MD

## 2019-06-17 NOTE — PROGRESS NOTES
Occupational Therapy Note:  
06/17/19 Chart reviewed for OT treatment, patient received in bed with granddaughter at bedside, reporting patient has just finished eating and would like to rest. Patient observed to shake head \"no\" when asked if she would like to get OOB to chair. Will continue to follow. Thank you.  
Danika Joseph OTR/L

## 2019-06-17 NOTE — PROGRESS NOTES
Patient remains seizure free in hospital. 
Patient vital signs stable. Up c 1 assist to [facility]. Skin remains intact. Discharge plan likely continue rehabilitation in SNF facility.

## 2019-06-17 NOTE — PHYSICIAN ADVISORY
Letter of Status Determination:  
Recommend hospitalization status upgraded from OBSERVATION  to INPATIENT  Status Pt Name:  Lisa Mcguire MR#  
ELDON # [id number] / 
[id number] Payor: Nancy Page / Plan: [employer] % / Product Type: Skyler Saba /   
CSN#  [id number] Room and Hospital  662/01  @ [facility] hospital  
Hospitalization date  6/13/2019  2:04 PM  
Current Attending Physician  Jensen Nelson MD  
Principal diagnosis  CVA and seizure Clinicals  71 y.o. female with past medical history significant for mca stroke, aphasia, dm, htn, who presents from ems with chief complaint of syncope. Pt was LKW at 1:30 pm at her pcp's office for a routine following up. After the check up. Her son-in-law is driving her home. On the way to home, patient had syncope episode, LOC about 1-2 mins. Son-in-law pulled the car over and call EMS. Family said her left sided weakness and left facial droop was chronic. EMS arrived on scene to report that pt was vomiting and non-verbal. svere stenosis Rt MCA, MRI with Acute small right extreme posterior corona radiata infarct along the anterior lateral aspect of the right atria of the lateral ventricle. needs medical optimization Milliman (MCG) criteria STATUS DETERMINATION  Inpatient The final decision of the patient's hospitalization status depends on the attending physician's judgment Additional comments Payor: Nancy Love / Plan: [employer] % / Product Type: Skyler Saba /   
  
 
Kevin Berry MD 
Cell: [phone] Physician Advisor Arpit Bautista

## 2019-06-17 NOTE — PROGRESS NOTES
-Hematology / Oncology ([facility]) - 
-Primary Oncologist-  
-CC- cva 
 
-S-  No events, alert cooperative D/w daughter Patient Vitals for the past 24 hrs: 
 Temp Pulse Resp BP SpO2  
06/17/19 0922  91  152/80   
06/17/19 0556 96.8 °F (36 °C) 79 18 144/85   
06/17/19 0200 97.6 °F (36.4 °C) 85 20 142/64 94 % 06/16/19 2153 97.7 °F (36.5 °C) 80 17 (!) 159/94 95 % 06/16/19 1935    143/71   
06/16/19 1800 98.8 °F (37.1 °C) 94 22 165/79 94 % 06/16/19 1400 98.3 °F (36.8 °C) 81 23 150/69 98 % 06/16/19 1000 98 °F (36.7 °C) 78 19 158/75 97 % No intake/output data recorded. Gen: nad Chest: bilateral breath sounds present Cardiac: rrr Abd: s/nt Ext no cce Skin no rash 
 
-Labs-   
Recent Labs  
  06/17/19 
0251 06/16/19 
0349 06/15/19 
0303 WBC 8.1 6.4 6.5 HGB 14.1 13.5 14.1 * 593* 455* ANEU  --  4.0  --   
 143  --   
K 3.9 3.9  --   
* 111*  --   
BUN 12 14  --   
CREA 1.03* 1.12*  --   
CA 9.2 8.6  --   
 
 
-Imaging-  
 
 
-Assessment + Plan-    
*) Thrombocytosis JAK2 pending On hydrea PLT count 606 Increase hydrea to two tabs daily Yajaira Mills MD

## 2019-06-17 NOTE — PROGRESS NOTES
Problem: TIA/CVA Stroke: Day 2 Until Discharge Goal: Activity/Safety Outcome: Progressing Towards Goal 
Goal: Diagnostic Test/Procedures Outcome: Progressing Towards Goal 
Goal: Nutrition/Diet Outcome: Progressing Towards Goal 
Goal: Discharge Planning Outcome: Progressing Towards Goal 
Goal: Medications Outcome: Progressing Towards Goal 
Goal: Treatments/Interventions/Procedures Outcome: Progressing Towards Goal 
Goal: Psychosocial 
Outcome: Progressing Towards Goal 
Goal: *Verbalizes anxiety and depression are reduced or absent Outcome: Progressing Towards Goal 
Goal: *Absence of aspiration Outcome: Progressing Towards Goal 
Goal: *Absence of deep venous thrombosis signs and symptoms(Stroke Metric) Outcome: Progressing Towards Goal 
Goal: *Optimal pain control at patient's stated goal 
Outcome: Progressing Towards Goal 
Goal: *Tolerating diet Outcome: Progressing Towards Goal 
Goal: *Ability to perform ADLs and demonstrates progressive mobility and function Outcome: Progressing Towards Goal 
Goal: *Stroke education continued(Stroke Metric) Outcome: Progressing Towards Goal

## 2019-06-17 NOTE — PROGRESS NOTES
Speech Path Patient seen with daughter present. Full note forthcoming. Patient tolerating regular diet without any overt issues. Sushma Araiza MS, CCC-SLP, BCS-S

## 2019-06-17 NOTE — PROGRESS NOTES
Problem: Dysphagia (Adult) Goal: *Acute Goals and Plan of Care (Insert Text) Description Speech Pathology Initiated 6/14/19 MET 6/17 1. Patient will tolerate a regular diet/thin liquids without overt s/s of aspiration within 7 days MET 6/17 2. Patient will participate in a language assessment as clinically indicated within 7 days  MET 6/17 6/17/2019 1338 by MARY BETH Foster Outcome: Resolved/Met SPEECH LANGUAGE PATHOLOGY DYSPHAGIA TREATMENT/DISCHARGE Patient: Anne Gilmore (87 y.o. female) Date: 6/17/2019 Diagnosis: Altered mental status [R41.82] TIA (transient ischemic attack) [G45.9] <principal problem not specified> Precautions: swallow ASSESSMENT: 
Patient upgraded to regular diet over weekend; eating breakfast upon SLP entry into the room and tolerating this with mildly slow mastication but full oral clearance and no overt s/s of aspiration. Observed patient taking a handful of medication with sips of milk without issue. Per daughter, patient does not eating much as meals but does drink a lot of fluid during the day. No further skilled dysphagia tx warranted. Signing off on swallowing goals. PLAN: 
Continue regular diet/thin liquids 2. Straws OK Safe swallowing strategies (upright for all PO, small bites/sips, slow rate) Patient will be discharged from acute skilled speech therapy at this time. Rationale for discharge: 
?      Goals Achieved ? Michael Beltrán ? Patient not participating in therapy ? Other: 
Discharge Recommendations: To Be Determined SUBJECTIVE:  
Patient smiling. Daughter bedside and translating for patient as patient has not had a welcoming reception to the [employer] phone. NAD. OBJECTIVE:  
Cognitive and Communication Status: 
Neurologic State: Alert Orientation Level: Oriented to person, Oriented to place, Disoriented to time, Disoriented to situation Cognition: Decreased attention/concentration, Decreased command following Perception: Appears intact Perseveration: No perseveration noted Safety/Judgement: Decreased insight into deficits Dysphagia Treatment: 
Oral Assessment: 
Oral Assessment Labial: No impairment Dentition: Natural;Intact Oral Hygiene: moist oral mucosa Lingual: No impairment Velum: Unable to visualize Mandible: No impairment P.O. Trials: 
Patient Position: Upright in bed Vocal quality prior to P.O.: Low volume Consistency Presented: Solid; Thin liquid How Presented: Straw;Self-fed/presented Bolus Acceptance: No impairment Bolus Formation/Control: Impaired Type of Impairment: Delayed;Mastication Propulsion: No impairment Oral Residue: None Initiation of Swallow: No impairment Laryngeal Elevation: Functional 
Aspiration Signs/Symptoms: None Pharyngeal Phase Characteristics: No impairment, issues, or problems Oral Phase Severity: Mild Pharyngeal Phase Severity : No impairment NOMS:  
The NOMS functional outcome measure was used to quantify this patient's level of swallowing impairment. Based on the NOMS, the patient was determined to be at level 7 for swallow function NOMS Swallowing Levels: 
Level 1 (CN): NPO Level 2 (CM): NPO but takes consistency in therapy Level 3 (CL): Takes less than 50% of nutrition p.o. and continues with nonoral feedings; and/or safe with mod cues; and/or max diet restriction Level 4 (CK): Safe swallow but needs mod cues; and/or mod diet restriction; and/or still requires some nonoral feeding/supplements Level 5 (CJ): Safe swallow with min diet restriction; and/or needs min cues Level 6 (CI): Independent with p.o.; rare cues; usually self cues; may need to avoid some foods or needs extra time Level 7 (CH): Independent for all p.o. KAVITHA. (2003). National Outcomes Measurement System (NOMS): Adult Speech-Language Pathology User's Guide. Pain: Pain Scale 1: Numeric (0 - 10) Pain Intensity 1: 0 After treatment:  
?                Patient left in no apparent distress sitting up in chair ? Patient left in no apparent distress in bed 
? Call bell left within reach ? Nursing notified ? Caregiver present ? Bed alarm activated COMMUNICATION/EDUCATION:  
 
 
The patient?s plan of care including recommendations, planned interventions, and recommended diet changes were discussed with: Physical Therapist and Registered Nurse. ? Posted safety precautions in patient's room. Sushma Tafoya, MS, CCC-SLP, BCS-S Time Calculation: 40 mins

## 2019-06-17 NOTE — PROGRESS NOTES
Problem: Communication Impaired (Adult) Goal: *Acute Goals and Plan of Care (Insert Text) Description Speech Pathology Initiated 6/17/19 1. Patient will complete further assessment of moderately complex language skills within 7 days 2. Patient will utilize compensatory speech strategies to increase intelligibility at the word level with moderate cues within 7 days Outcome: Progressing Towards Goal 
 
SPEECH LANGUAGE PATHOLOGY EVALUATION Patient: Mateo Alves (89 y.o. female) Date: 6/17/2019 Primary Diagnosis: Altered mental status [R41.82] TIA (transient ischemic attack) [G45.9] Precautions:  
 
ASSESSMENT : 
Difficult to complete language assessment given language barrier and patient's baseline state as quiet/not a big talker. Daughter present and provided translation as patient has not been responsive to [employer] phone or in-person  (who speaks a different dialect). Patient Ox2 with basic comprehension and expression intact. She can verbalize simple words in English with ~80% intelligibility which appears related to dialect and not \"true\" dysarthria. Daughter however, does report slurred speech and difficulty with word finding. No fluent expression (I.e. Sentence responses) elicited so difficult to fully assess intelligibility. Patient will benefit from skilled intervention to address the above impairments. Patient?s rehabilitation potential is considered to be Good Factors which may influence rehabilitation potential include:  
? None noted ? Mental ability/status ? Medical condition ? Home/family situation and support systems ? Safety awareness ? Pain tolerance/management ? Other: PLAN : 
Recommendations and Planned Interventions: SLP tx for further assessment as able; will need family present to see patient for skilled SLP tx 
 Frequency/Duration: Patient will be followed by speech-language pathology 3 times a week to address goals. Discharge Recommendations: To Be Determined SUBJECTIVE:  
Patient alert, smiling, and joking some today. Daughter Bairon Hardy) present and providing translation given poor response to the [employer] phone. OBJECTIVE:  
 
Past Medical History:  
Diagnosis Date Diabetes ([address] Hypertension Stroke [facility]) No past surgical history on file. Prior Level of Function/Home Situation:  
Home Situation Home Environment: Private residence # Steps to Enter: 0 One/Two Story Residence: Two story Living Alone: No 
Support Systems: Family member(s), Child(alexus) Patient Expects to be Discharged to[de-identified] Private residence Current DME Used/Available at Home: Shower chair Tub or Shower Type: Tub/Shower combination Mental Status: 
Neurologic State: Alert Orientation Level: Oriented to person, Oriented to place, Disoriented to time, Disoriented to situation Cognition: Decreased attention/concentration, Decreased command following Perception: Appears intact Perseveration: No perseveration noted Safety/Judgement: Decreased insight into deficits Motor Speech: 
Oral-Motor Structure/Motor Speech Labial: No impairment Dentition: Natural;Intact Oral Hygiene: moist oral mucosa Lingual: No impairment Velum: Unable to visualize Mandible: No impairment Apraxic Characteristics: None Dysarthric Characteristics: Imprecise Language Comprehension and Expression: Auditory Comprehension Auditory Impairment: Yes One-Step Basic Commands (%): 100 % Interfering Components: Processing speed Effective Techniques: Extra processing time Cueing type: Verbal 
Verbal Expression Primary Mode of Expression: Verbal 
Initiation: No impairment Naming: No impairment(for basic info and simple ADL objects) Conversation: [place] Speech Characteristics: Word retrieval 
Interfering Components: Environmental distractions Voice: 
Vocal Quality: Low volume The NOMS functional outcome measure was used to quantify this patient's level of motor speech impairment. Based on the NOMS, the patient was determined to be at level 5 for motor speech function. NOMS Motor Speech: 
Level 1 (CN):  100% unintelligible Level 2 (CM):  Communication partner responsible for message; can do CV or automatic words w/ max cues but rarely intelligible in context Level 3 (CL): communication partner primarily responsible for message but says CV/automatic words intelligibly; mod cues to say simple words/phrases Level 4 (CK): In structured conversation with familiar listener can say simple words and phrases. Mod cues for simple sentences Level 5 (CJ):  Uses simple sentences for ADLs with familiar and unfamiliar listener; min cues for complex sentences Level 6 (CI):  Intelligible in ADLs; difficulty in voc/social activites; rare cues for complex message; uses comp strategies Level 7 (CH):  Intelligible in all activities. May occasionally use compensatory strategies. KAVITHA. (2003). National Outcomes Measurement System (NOMS): Adult Speech-Language Pathology User's Guide. Pain: 
Pain Scale 1: Numeric (0 - 10) Pain Intensity 1: 0 After treatment:  
?              Patient left in no apparent distress sitting up in chair ? Patient left in no apparent distress in bed 
? Call bell left within reach ? Nursing notified ? Caregiver present ? Bed alarm activated COMMUNICATION/EDUCATION:  
The patient?s plan of care including recommendations and planned interventions was discussed with: Registered Nurse. Patient was educated regarding Her deficit(s) of dysarthria, aphasia as this relates to Her diagnosis of CVA. She demonstrated Guarded understanding as evidenced by verbalization. ? Patient/family have participated as able in goal setting and plan of care. ?  Patient/family agree to work toward stated goals and plan of care. ?  Patient understands intent and goals of therapy, but is neutral about his/her participation. ? Patient is unable to participate in goal setting and plan of care. Thank you for this referral. 
Kassi Tamayo. Charisse Rivera MS, CCC-SLP, BCS-S Time Calculation: 40 mins

## 2019-06-18 ENCOUNTER — HOME HEALTH ADMISSION (OUTPATIENT)
Dept: HOME HEALTH SERVICES | Facility: HOME HEALTH | Age: 64
End: 2019-06-18

## 2019-06-18 VITALS
RESPIRATION RATE: 23 BRPM | HEART RATE: 94 BPM | DIASTOLIC BLOOD PRESSURE: 78 MMHG | SYSTOLIC BLOOD PRESSURE: 133 MMHG | TEMPERATURE: 97.5 F | WEIGHT: 101.85 LBS | BODY MASS INDEX: 16.95 KG/M2 | OXYGEN SATURATION: 100 %

## 2019-06-18 PROBLEM — R41.82 ALTERED MENTAL STATUS: Status: RESOLVED | Noted: 2019-01-27 | Resolved: 2019-06-18

## 2019-06-18 PROBLEM — G45.9 TIA (TRANSIENT ISCHEMIC ATTACK): Status: RESOLVED | Noted: 2019-06-17 | Resolved: 2019-06-18

## 2019-06-18 LAB
ERYTHROCYTE [DISTWIDTH] IN BLOOD BY AUTOMATED COUNT: 12.1 % (ref 11.5–14.5)
HCT VFR BLD AUTO: 41.8 % (ref 35–47)
HGB BLD-MCNC: 13.7 G/DL (ref 11.5–16)
MCH RBC QN AUTO: 29 PG (ref 26–34)
MCHC RBC AUTO-ENTMCNC: 32.8 G/DL (ref 30–36.5)
MCV RBC AUTO: 88.4 FL (ref 80–99)
NRBC # BLD: 0 K/UL (ref 0–0.01)
NRBC BLD-RTO: 0 PER 100 WBC
PLATELET # BLD AUTO: 602 K/UL (ref 150–400)
PMV BLD AUTO: 10.2 FL (ref 8.9–12.9)
RBC # BLD AUTO: 4.73 M/UL (ref 3.8–5.2)
WBC # BLD AUTO: 7.8 K/UL (ref 3.6–11)

## 2019-06-18 PROCEDURE — 74011250637 HC RX REV CODE- 250/637: Performed by: FAMILY MEDICINE

## 2019-06-18 PROCEDURE — 85027 COMPLETE CBC AUTOMATED: CPT

## 2019-06-18 PROCEDURE — 74011250636 HC RX REV CODE- 250/636: Performed by: INTERNAL MEDICINE

## 2019-06-18 PROCEDURE — 74011636637 HC RX REV CODE- 636/637: Performed by: HOSPITALIST

## 2019-06-18 PROCEDURE — 74011250637 HC RX REV CODE- 250/637: Performed by: HOSPITALIST

## 2019-06-18 PROCEDURE — 36415 COLL VENOUS BLD VENIPUNCTURE: CPT

## 2019-06-18 RX ORDER — HYDROXYUREA 500 MG/1
1000 CAPSULE ORAL DAILY
Qty: 60 CAP | Refills: 2 | Status: SHIPPED | OUTPATIENT
Start: 2019-06-19 | End: 2019-09-17

## 2019-06-18 RX ADMIN — LISINOPRIL 40 MG: 20 TABLET ORAL at 08:42

## 2019-06-18 RX ADMIN — CLOPIDOGREL BISULFATE 75 MG: 75 TABLET ORAL at 08:42

## 2019-06-18 RX ADMIN — INSULIN GLARGINE 20 UNITS: 100 INJECTION, SOLUTION SUBCUTANEOUS at 08:38

## 2019-06-18 RX ADMIN — AMLODIPINE BESYLATE 5 MG: 5 TABLET ORAL at 08:45

## 2019-06-18 RX ADMIN — HYDROXYUREA 1000 MG: 500 CAPSULE ORAL at 08:42

## 2019-06-18 RX ADMIN — Medication 10 ML: at 15:11

## 2019-06-18 RX ADMIN — ASPIRIN 81 MG: 81 TABLET ORAL at 08:42

## 2019-06-18 RX ADMIN — LEVETIRACETAM 500 MG: 500 TABLET ORAL at 08:42

## 2019-06-18 RX ADMIN — ATORVASTATIN CALCIUM 10 MG: 10 TABLET, FILM COATED ORAL at 08:42

## 2019-06-18 RX ADMIN — Medication 10 ML: at 05:42

## 2019-06-18 RX ADMIN — HYDROCHLOROTHIAZIDE 25 MG: 25 TABLET ORAL at 08:42

## 2019-06-18 NOTE — PROGRESS NOTES
Hospital follow-up PCP transitional care appointment has been scheduled with Dr. Reddy Angelo for Thursday, 6/20/19 at 11:10 a.m. Pending patient discharge.   Jose Doe, Care Management Specialist.

## 2019-06-18 NOTE — DISCHARGE SUMMARY
Discharge Summary PATIENT ID: Toni Bender MRN: [id number] YOB: 1955 DATE OF ADMISSION: 6/13/2019  2:04 PM   
DATE OF DISCHARGE: 6/18/19 PRIMARY CARE PROVIDER: Mann Sharp MD  
 
ATTENDING PHYSICIAN: Rumaldo Galeazzi DISCHARGING PROVIDER: Dorothy Sy MD   
To contact this individual call [phone] and ask the  to page. If unavailable ask to be transferred the Adult Hospitalist Department. CONSULTATIONS: IP CONSULT TO HOSPITALIST 
IP CONSULT TO NEUROLOGY 
IP CONSULT TO HEMATOLOGY PROCEDURES/SURGERIES: * No surgery found * [address] COURSE:  
71 y.o. female with past medical history significant for mca stroke, aphasia, dm, htn, who presents from ems with chief complaint of syncope. Pt was LKW at 1:30 pm at her pcp's office for a routine following up. After the check up. Her son-in-law is driving her home. On the way to home, patient had syncope episode, LOC about 1-2 mins. Son-in-law pulled the car over and call EMS.  Family said her left sided weakness and left facial droop was chronic. EMS arrived on scene to report that pt was vomiting and non-verbal.  
Currently patient is awake.  
   
  
Assessment & Plan:  
  
1. New CVA with seizure: - Tele monitor in neuro she has a new infract on asa / plavix  
- P2Y12 AND ASA TEST WNL 
- repeat brain MRI 1. Acute small right extreme posterior corona radiata infarct along the anterior 
lateral aspect of the right atria of the lateral ventricle. 2. Chronic findings of encephalomalacia, volume loss and chronic small vessel 
ischemic changes again noted. - EEG, will increase keppra to 500mg bid - Reviewed EEG Abnormal EEG due to left temporal sharp waves 
  
2. Severe stenosis of right MCA:  
per neuro IR, no acute thrombosis, no intervention for now.  
  
3. CKD 3 stable 
  
4. Thrombocytosis: ? Essential thrombocytosis,  
- W/U per hematology OMAR 2 pending - started on hydroxyurea plt count 600 k 
- f/u out pt 
  
5: IDDM: resume lantus, SSI, will follow bg 
  
6. HTN: continue home meds  
  
Code status: Full DVT prophylaxis: SCD  
 
 
 
 
DISCHARGE DIAGNOSES / PLAN:   
 
1. D/c home ADDITIONAL CARE RECOMMENDATIONS:  
PENDING TEST RESULTS:  
At the time of discharge the following test results are still pending: FOLLOW UP APPOINTMENTS:   
Follow-up Information Follow up With Specialties Details Why Contact Info Anahi Malone MD Family Practice In 1 week  [address] [facility] [address] [id number] [phone] Bibi Casiano MD Hematology and Oncology, Hematology, Oncology In 1 week  [address] [facility] [address] [phone] 
[phone] DIET: Cardiac Diet ACTIVITY: Activity as tolerated WOUND CARE:  
 
EQUIPMENT needed:  
 
 
DISCHARGE MEDICATIONS: 
Current Discharge Medication List  
  
START taking these medications Details  
hydroxyurea (HYDREA) 500 mg capsule Take 2 Caps by mouth daily for 90 days. Qty: 60 Cap, Refills: 2 CONTINUE these medications which have NOT CHANGED Details  
diclofenac (VOLTAREN) 1 % gel Apply  to affected area four (4) times daily. Qty: 100 g, Refills: 0 Associated Diagnoses: Right foot pain  
  
amLODIPine (NORVASC) 5 mg tablet Take 1 Tab by mouth daily. Qty: 30 Tab, Refills: 2 Associated Diagnoses: Essential hypertension with goal blood pressure less than 140/90  
  
insulin glargine (LANTUS,BASAGLAR) 100 unit/mL (3 mL) inpn 20 Units by SubCUTAneous route daily. Qty: 6 mL, Refills: 2 Comments: Please help find lowest cost option for this with her new Medicaid coverage. Thanks! Associated Diagnoses: Uncontrolled type 2 diabetes mellitus without complication, with long-term current use of insulin ([address] levETIRAcetam (KEPPRA) 250 mg tablet Take 1 Tab by mouth every morning AND 2 Tabs every evening. Qty: 90 Tab, Refills: 3 metoprolol tartrate (LOPRESSOR) 50 mg tablet Take 1 Tab by mouth two (2) times a day. Qty: 30 Tab, Refills: 1 Associated Diagnoses: Essential hypertension with goal blood pressure less than 140/90  
  
metFORMIN ER (GLUCOPHAGE XR) 500 mg tablet TAKE ONE TABLET BY MOUTH IN THE MORNING AND TWO AT NIGHT FOR ONE WEEK AND THEN TWO TABLETS IN THE MORNING AND TWO TABLETS AT NIGHT Qty: 120 Tab, Refills: 5 Comments: Please consider 90 day supplies to promote better adherence Associated Diagnoses: Type 2 diabetes mellitus without complication, with long-term current use of insulin (HCC)  
  
lisinopril-hydroCHLOROthiazide (PRINZIDE, ZESTORETIC) 20-25 mg per tablet Take 2 Tabs by mouth daily. Qty: 60 Tab, Refills: 5 Associated Diagnoses: Essential hypertension with goal blood pressure less than 140/90; Type 2 diabetes with nephropathy (HCC) aspirin delayed-release 81 mg tablet Take 1 Tab by mouth daily. Qty: 30 Tab, Refills: 1  
  
clopidogrel (PLAVIX) 75 mg tab Take 1 Tab by mouth daily. Qty: 30 Tab, Refills: 5 Comments: To replace previous rx Associated Diagnoses: Intracranial vascular stenosis; History of CVA (cerebrovascular accident)  
  
atorvastatin (LIPITOR) 10 mg tablet Take 1 Tab by mouth daily. Qty: 30 Tab, Refills: 2 NOTIFY YOUR PHYSICIAN FOR ANY OF THE FOLLOWING:  
Fever over 101 degrees for 24 hours. Chest pain, shortness of breath, fever, chills, nausea, vomiting, diarrhea, change in mentation, falling, weakness, bleeding. Severe pain or pain not relieved by medications. Or, any other signs or symptoms that you may have questions about. DISPOSITION: 
 x Home With: 
 OT x PT x HH  RN  
  
 Long term SNF/Inpatient Rehab Independent/assisted living Hospice Other:  
 
 
PATIENT CONDITION AT DISCHARGE:  
 
Functional status  
x Poor Deconditioned Independent Cognition Lucid   
x Forgetful Dementia Catheters/lines (plus indication) Khan PICC   
 PEG   
x None Code status  
x  Full code DNR   
 
PHYSICAL EXAMINATION AT DISCHARGE: 
  
Constitutional:  No acute distress, cooperative, ENT:  Oral mucous moist, Resp:  CTA bilaterally. CV:  Regular rhythm, normal rate GI:  Soft, non distended, non tender. BS+ Musculoskeletal:  No edema, warm, 2+ pulses throughout Neurologic:  Moves all extremities. AAOx1 CHRONIC MEDICAL DIAGNOSES: 
Problem List as of 6/18/2019 Date Reviewed: 1/27/2019 Codes Class Noted - Resolved Slurred speech ICD-10-CM: R47.81 ICD-9-CM: 784.59  1/27/2019 - Present Difficulty walking ICD-10-CM: R26.2 ICD-9-CM: 719.7  1/27/2019 - Present Type 2 diabetes with nephropathy ([facility] ICD-10-CM: E11.21 
ICD-9-CM: 250.40, 583.81  5/11/2018 - Present Seizure ([facility] ICD-10-CM: R56.9 ICD-9-CM: 780.39  5/6/2018 - Present Essential hypertension with goal blood pressure less than 140/90 ICD-10-CM: I10 
ICD-9-CM: 401.9  9/13/2016 - Present Osteoarthritis of right knee ICD-10-CM: M17.11 ICD-9-CM: 715.96  8/28/2014 - Present Illiteracy ICD-10-CM: Z55.0 ICD-9-CM: V62.3  2/4/2014 - Present Dyspepsia ICD-10-CM: R10.13 ICD-9-CM: 536.8  2/4/2014 - Present Diabetes mellitus type 2, controlled ([facility] ICD-10-CM: E11.9 ICD-9-CM: 250.00  2/18/2013 - Present RESOLVED: TIA (transient ischemic attack) ICD-10-CM: G45.9 ICD-9-CM: 435.9  6/17/2019 - 6/18/2019 RESOLVED: Altered mental status ICD-10-CM: R41.82 
ICD-9-CM: 780.97  1/27/2019 - 6/18/2019 RESOLVED: Dietary surveillance and counseling ICD-10-CM: Z71.3 ICD-9-CM: V65.3  2/18/2013 - 3/27/2013 RESOLVED: Pain in limb ICD-10-CM: M79.609 ICD-9-CM: 729.5  2/18/2013 - 3/27/2013 Greater than 30  minutes were spent with the patient on counseling and coordination of care Signed:  
Gonsalo Easley MD 
6/18/2019 
10:12 AM

## 2019-06-18 NOTE — PROGRESS NOTES
-Hematology / Oncology ([facility]) - 
-Primary Oncologist-  
-CC- cva 
 
-S-  No events, alert cooperative, alert answers questions D/w nursing Patient Vitals for the past 24 hrs: 
 Temp Pulse Resp BP SpO2  
06/18/19 0845  89  125/70   
06/18/19 0600 97.6 °F (36.4 °C) 80 19 123/75 94 % 06/18/19 0200 97.8 °F (36.6 °C) 87 19 129/76 94 % 06/17/19 2200 98.7 °F (37.1 °C) 84 19 126/66 100 % 06/17/19 1800 98.2 °F (36.8 °C) 100 24 154/82 94 % 06/17/19 1400 97.6 °F (36.4 °C) 100 25 123/77 93 % 06/17/19 1000 96.9 °F (36.1 °C) 89 24 150/84 96 % 06/17/19 0922  91  152/80  No intake/output data recorded. Gen: nad Chest: bilateral breath sounds present Cardiac: rrr Abd: s/nt Ext no cce Skin no rash 
 
-Labs-   
Recent Labs  
  06/18/19 
0404 06/17/19 
0251 06/16/19 
0209 WBC 7.8 8.1 6.4 HGB 13.7 14.1 13.5 * 606* 593* ANEU  --   --  4.0  
NA  --  141 143 K  --  3.9 3.9 GLU  --  109* 111* BUN  --  12 14 CREA  --  1.03* 1.12* CA  --  9.2 8.6  
 
 
-Imaging-  
 
 
-Assessment + Plan-    
*) Thrombocytosis JAK2 pending On hydrea PLT count 602 Increased hydrea to two tabs daily yesterday, will leave at current dose If she is discharged will need f/u with  Me late next week Verónica Edmonds MD

## 2019-06-18 NOTE — PROGRESS NOTES
Bedside shift change report given to Dave Beard RN (oncoming nurse) by Jaxson Roth RN (offgoing nurse). Report included the following information SBAR, Kardex, Intake/Output, MAR, Recent Results and Cardiac Rhythm NSR.   
\

## 2019-06-18 NOTE — PROGRESS NOTES
Bedside and Verbal shift change report given to Rosy Moreno RN (oncoming nurse) by Tamra Tanner RN (offgoing nurse).  Report included the following information SBAR, Kardex, MAR, Recent Results and Cardiac Rhythm SR.

## 2019-06-18 NOTE — DISCHARGE INSTRUCTIONS
Discharge Instructions       PATIENT ID: Candice Sanches  MRN: [id number]   YOB: 1955    DATE OF ADMISSION: 6/13/2019  2:04 PM    DATE OF DISCHARGE: 6/18/2019    PRIMARY CARE PROVIDER: Jerry Brantley MD     ATTENDING PHYSICIAN: Earl Ramirez MD  DISCHARGING PROVIDER: Darleen Herman MD    To contact this individual call [phone] and ask the  to page. If unavailable ask to be transferred the Adult Hospitalist Department. DISCHARGE DIAGNOSES STROKE    CONSULTATIONS: IP CONSULT TO HOSPITALIST  IP CONSULT TO NEUROLOGY  IP CONSULT TO HEMATOLOGY    PROCEDURES/SURGERIES: * No surgery found *    PENDING TEST RESULTS:   At the time of discharge the following test results are still pending:     FOLLOW UP APPOINTMENTS:   Follow-up Information     Follow up With Specialties Details Why Contact Info    Jerry Brantley MD [facility] In 1 week  [place] [address]  [phone]      Clarke Holt MD Hematology and Oncology, Hematology, Oncology In 1 week  [address] [phone]             ADDITIONAL CARE RECOMMENDATIONS:     DIET: Regular Diet and Cardiac Diet  ACTIVITY: Activity as tolerated    WOUND CARE:     EQUIPMENT needed:       DISCHARGE MEDICATIONS:   See Medication Reconciliation Form    · It is important that you take the medication exactly as they are prescribed. · Keep your medication in the bottles provided by the pharmacist and keep a list of the medication names, dosages, and times to be taken in your wallet. · Do not take other medications without consulting your doctor. NOTIFY YOUR PHYSICIAN FOR ANY OF THE FOLLOWING:   Fever over 101 degrees for 24 hours. Chest pain, shortness of breath, fever, chills, nausea, vomiting, diarrhea, change in mentation, falling, weakness, bleeding. Severe pain or pain not relieved by medications.   Or, any other signs or symptoms that you may have questions about.       DISPOSITION:   x Home With:   OT  PT  HH  RN       SNF/Inpatient Rehab/LTAC    Independent/assisted living    Hospice    Other:     CDMP Checked:   Yes x     PROBLEM LIST Updated:  Yes x       Signed:   Stuart Burton MD  6/18/2019  10:11 AM

## 2019-06-18 NOTE — PROGRESS NOTES
I have reviewed discharge instructions with the caregiver. The caregiver verbalized understanding. Discharge medications reviewed with caregiver and appropriate educational materials and side effects teaching were provided. Signed copy placed on chart. Pt's copy placed in d/c binder. PIV removed. Pt's son-in-law providing transportation home. Pt will be wheeled downstairs by staff. Bedside RN performed patient education and medication education. Discharge concerns initiated and discussed with patient, including clarification on \"who\" assists the patient at their home and instructions for when the home going patient should call their provider after discharge. Opportunity for questions and clarification was provided. Patient receptive to education: NO, son will be primary learner Patient stated: none, pt confused and does not speak [place] Barriers to Education: Mentation Diagnosis Education given:  YES Length of stay: 1 Expected Day of Discharge: 6/18 Ask if they have \"Help at Home\" & add to white board? YES Stroke Education documented in Patient Education: YES Core Measures Documented in [facility]: 
Risk Factors: YES Warning signs of stroke: YES When to Activate 911: YES Medication Education for Risk Factors: YES Smoking cessation if applicable: NO 
Written Education Given:  YES Discharge NIH Completed: YES Score: 8 BRAINS: YES Follow Up Appointment Made: Pt to see Hem/Onc Date/Time if applicable: Hussein Narvaez in 1 week Stroke Education documented in

## 2019-06-18 NOTE — PROGRESS NOTES
Transitions for Care Plan 1. Discharge noted 2. [facility] will follow for PT/OT 3. Spoke with son-in-law Thong Morales and he will transport patient home David Roman RN CRM Ext [phone]

## 2019-06-19 ENCOUNTER — HOME CARE VISIT (OUTPATIENT)
Dept: SCHEDULING | Facility: HOME HEALTH | Age: 64
End: 2019-06-19

## 2019-06-19 ENCOUNTER — HOSPITAL ENCOUNTER (EMERGENCY)
Age: 64
Discharge: HOME | End: 2019-06-19
Attending: EMERGENCY MEDICINE
Payer: SUBSIDIZED

## 2019-06-19 ENCOUNTER — HOME CARE VISIT (OUTPATIENT)
Dept: HOME HEALTH SERVICES | Facility: HOME HEALTH | Age: 64
End: 2019-06-19

## 2019-06-19 ENCOUNTER — APPOINTMENT (OUTPATIENT)
Dept: CT IMAGING | Age: 64
End: 2019-06-19
Attending: EMERGENCY MEDICINE
Payer: SUBSIDIZED

## 2019-06-19 VITALS
OXYGEN SATURATION: 95 % | BODY MASS INDEX: 19.59 KG/M2 | DIASTOLIC BLOOD PRESSURE: 77 MMHG | HEART RATE: 85 BPM | WEIGHT: 117.73 LBS | RESPIRATION RATE: 21 BRPM | SYSTOLIC BLOOD PRESSURE: 134 MMHG | TEMPERATURE: 97.1 F

## 2019-06-19 DIAGNOSIS — G40.919 BREAKTHROUGH SEIZURE (HCC): Primary | ICD-10-CM

## 2019-06-19 LAB
ALBUMIN SERPL-MCNC: 3.6 G/DL (ref 3.5–5)
ALBUMIN/GLOB SERPL: 0.9 {RATIO} (ref 1.1–2.2)
ALP SERPL-CCNC: 89 U/L (ref 45–117)
ALT SERPL-CCNC: 59 U/L (ref 12–78)
ANION GAP SERPL CALC-SCNC: 8 MMOL/L (ref 5–15)
APPEARANCE UR: CLEAR
AST SERPL-CCNC: 35 U/L (ref 15–37)
BACKGROUND, CALR2T: NORMAL
BACKGROUND: 489207: ABNORMAL
BASOPHILS # BLD: 0 K/UL (ref 0–0.1)
BASOPHILS NFR BLD: 0 % (ref 0–1)
BILIRUB SERPL-MCNC: 0.3 MG/DL (ref 0.2–1)
BILIRUB UR QL: NEGATIVE
BUN SERPL-MCNC: 26 MG/DL (ref 6–20)
BUN/CREAT SERPL: 19 (ref 12–20)
CALCIUM SERPL-MCNC: 8.7 MG/DL (ref 8.5–10.1)
CALR MUTATION DETECTION RESULT, CALR1T: NORMAL
CHLORIDE SERPL-SCNC: 105 MMOL/L (ref 97–108)
CO2 SERPL-SCNC: 25 MMOL/L (ref 21–32)
COLOR UR: ABNORMAL
COMMENT, HOLDF: NORMAL
CREAT SERPL-MCNC: 1.35 MG/DL (ref 0.55–1.02)
DIFFERENTIAL METHOD BLD: ABNORMAL
DIRECTOR REVIEW: 489204: ABNORMAL
EOSINOPHIL # BLD: 0 K/UL (ref 0–0.4)
EOSINOPHIL NFR BLD: 0 % (ref 0–7)
ERYTHROCYTE [DISTWIDTH] IN BLOOD BY AUTOMATED COUNT: 12.1 % (ref 11.5–14.5)
GLOBULIN SER CALC-MCNC: 4 G/DL (ref 2–4)
GLUCOSE SERPL-MCNC: 194 MG/DL (ref 65–100)
GLUCOSE UR STRIP.AUTO-MCNC: NEGATIVE MG/DL
HCT VFR BLD AUTO: 42.7 % (ref 35–47)
HGB BLD-MCNC: 14.1 G/DL (ref 11.5–16)
HGB UR QL STRIP: NEGATIVE
IMM GRANULOCYTES # BLD AUTO: 0.1 K/UL (ref 0–0.04)
IMM GRANULOCYTES NFR BLD AUTO: 1 % (ref 0–0.5)
JAK2 P.V617F BLD/T QL: ABNORMAL
KETONES UR QL STRIP.AUTO: ABNORMAL MG/DL
LAB DIRECTOR NAME PROVIDER: NORMAL
LACTATE BLD-SCNC: 1.07 MMOL/L (ref 0.4–2)
LEUKOCYTE ESTERASE UR QL STRIP.AUTO: NEGATIVE
LYMPHOCYTES # BLD: 2.7 K/UL (ref 0.8–3.5)
LYMPHOCYTES NFR BLD: 23 % (ref 12–49)
MAGNESIUM SERPL-MCNC: 1.5 MG/DL (ref 1.6–2.4)
MCH RBC QN AUTO: 28.9 PG (ref 26–34)
MCHC RBC AUTO-ENTMCNC: 33 G/DL (ref 30–36.5)
MCV RBC AUTO: 87.5 FL (ref 80–99)
MONOCYTES # BLD: 0.7 K/UL (ref 0–1)
MONOCYTES NFR BLD: 6 % (ref 5–13)
NEUTS SEG # BLD: 8.3 K/UL (ref 1.8–8)
NEUTS SEG NFR BLD: 70 % (ref 32–75)
NITRITE UR QL STRIP.AUTO: NEGATIVE
NRBC # BLD: 0 K/UL (ref 0–0.01)
NRBC BLD-RTO: 0 PER 100 WBC
PH UR STRIP: 5 [PH] (ref 5–8)
PLATELET # BLD AUTO: 765 K/UL (ref 150–400)
PLATELET COMMENTS,PCOM: ABNORMAL
PMV BLD AUTO: 10.3 FL (ref 8.9–12.9)
POTASSIUM SERPL-SCNC: 3.8 MMOL/L (ref 3.5–5.1)
PROT SERPL-MCNC: 7.6 G/DL (ref 6.4–8.2)
PROT UR STRIP-MCNC: NEGATIVE MG/DL
RBC # BLD AUTO: 4.88 M/UL (ref 3.8–5.2)
RBC MORPH BLD: ABNORMAL
REF LAB TEST METHOD: NORMAL
REFERENCES, CALR4T: NORMAL
SAMPLES BEING HELD,HOLD: NORMAL
SODIUM SERPL-SCNC: 138 MMOL/L (ref 136–145)
SP GR UR REFRACTOMETRY: 1.02 (ref 1–1.03)
UR CULT HOLD, URHOLD: NORMAL
UROBILINOGEN UR QL STRIP.AUTO: 0.2 EU/DL (ref 0.2–1)
WBC # BLD AUTO: 11.8 K/UL (ref 3.6–11)

## 2019-06-19 PROCEDURE — 96374 THER/PROPH/DIAG INJ IV PUSH: CPT

## 2019-06-19 PROCEDURE — 80053 COMPREHEN METABOLIC PANEL: CPT

## 2019-06-19 PROCEDURE — 83735 ASSAY OF MAGNESIUM: CPT

## 2019-06-19 PROCEDURE — 77030011943

## 2019-06-19 PROCEDURE — 85025 COMPLETE CBC W/AUTO DIFF WBC: CPT

## 2019-06-19 PROCEDURE — 83605 ASSAY OF LACTIC ACID: CPT

## 2019-06-19 PROCEDURE — 99285 EMERGENCY DEPT VISIT HI MDM: CPT

## 2019-06-19 PROCEDURE — 74011000258 HC RX REV CODE- 258: Performed by: EMERGENCY MEDICINE

## 2019-06-19 PROCEDURE — 81003 URINALYSIS AUTO W/O SCOPE: CPT

## 2019-06-19 PROCEDURE — 74011250636 HC RX REV CODE- 250/636: Performed by: EMERGENCY MEDICINE

## 2019-06-19 PROCEDURE — 70450 CT HEAD/BRAIN W/O DYE: CPT

## 2019-06-19 PROCEDURE — 96361 HYDRATE IV INFUSION ADD-ON: CPT

## 2019-06-19 RX ORDER — LEVETIRACETAM 1000 MG/1
1000 TABLET ORAL 2 TIMES DAILY
Qty: 60 TAB | Refills: 0 | Status: SHIPPED | OUTPATIENT
Start: 2019-06-19 | End: 2019-11-15 | Stop reason: SDUPTHER

## 2019-06-19 RX ADMIN — SODIUM CHLORIDE 1000 ML: 900 INJECTION, SOLUTION INTRAVENOUS at 12:58

## 2019-06-19 RX ADMIN — SODIUM CHLORIDE 1000 MG: 900 INJECTION, SOLUTION INTRAVENOUS at 12:59

## 2019-06-19 NOTE — ED TRIAGE NOTES
Patient arrives via EMS from private residence with cc of AMS. Family member reported patient's eyes rolled back in her head, patient assisted to ground. Patient difficult to arouse and nonverbal since event occurred. Family reports patient is in same condition as she was prior to previous admission. LTKW 1130 today, . Per EMS, all VSS. /86, RA. Patient recently discharged from [facility] yesterday for similar events. Patient's primary language Ibo, primary language from [place]. Comfort Awad MD at bedside for EMS report. Patient's Daughter adds that patient vomited this AM.  Upon arrival, patient's eyes open spontaneously. Patient remains nonverbal at this time. Daughter reports medication compliance with seizure medications.

## 2019-06-19 NOTE — CONSULTS
Neurology: 
 
Chart reviewed. Patient with epilepsy and recurrent strokes. Here for suspicious seizure. Keppra increased by Dr. Kristen Hernandez to 1 g every 12. Continue this dose. If she is back to baseline okay to discharge after being medically cleared. Family should call to make a follow-up with Dr. Andre East. 2 [facility], DO Neurology

## 2019-06-19 NOTE — ED NOTES
Patient alert and verbal at time of discharge. Patient ambulatory with minimal assistance to wheelchair for discharge. Pt given discharge instructions, patient education, 1 prescription, and follow up information. Pt verbalizes understanding. All questions answered. Pt discharged to home in private vehicle, ambulatory with minimal assistance. Pt A/Ox4, RA, pain controlled.

## 2019-06-19 NOTE — ED PROVIDER NOTES
71 y.o. female with past medical history significant for diabetes, HTN, stroke, and seizures who presents from home via EMS with chief complaint of AMS. Patient has h/o multiple CVAs, residual left sided weakness, on Plavix and ASA. She also has a h/o seizures, on keppra since last year. Patient was admitted here 1/27/19-2/1/19 for acute encephalopathy suspected from recurrent CVA, MRI showed severe focal stenosis in the proximal right P2 segment and Moderate focal stenosis in the proximal right M2 posterior division. Patient was recently admitted here 6/13/19-6/18/19 with a new CVA and s/p seizure, MRI showed acute small right extreme posterior corona radiata infarct along the anterior lateral aspect of the right atria of the lateral ventricle. While here, the patient had an abnormal EEG and her keppra was increased to 500mg BID. Patient's daughter states the patient went home last night and was acting normal. Daughter saw the patient normal at 11 AM this morning. She went to get the patient's medications and when she returned, witnessed the patient go stiff and her eyes rolled back in her head. Since then, patient has been difficult to arouse and not answering questions. Daughter states the patient's presentation last week was very similar to today's. Daughter does report some vomiting/dry heaving this morning. Blood sugar en route was 192. There are no other acute medical concerns at this time. Full history, physical exam, and ROS unable to be obtained due to:  AMS, patient not answering questions. Social hx: Nonsmoker; No EtOH use PCP: Anahi Malone MD 
 
Note written by Beuford Gowers, Scribe, as dictated by Tali Sevilla DO 12:39 PM 
 
The history is provided by the patient, a relative, the EMS personnel and medical records. The history is limited by a language barrier and the condition of the patient. A  was used (Daughter as needed). Past Medical History: Diagnosis Date  Diabetes ([facility]  Hypertension  Stroke ([facility] History reviewed. No pertinent surgical history. History reviewed. No pertinent family history. Social History Socioeconomic History  Marital status:  Spouse name: Not on file  Number of children: Not on file  Years of education: Not on file  Highest education level: Not on file Occupational History  Not on file Social Needs  Financial resource strain: Not on file  Food insecurity:  
  Worry: Not on file Inability: Not on file  Transportation needs:  
  Medical: Not on file Non-medical: Not on file Tobacco Use  Smoking status: Never Smoker  Smokeless tobacco: Never Used Substance and Sexual Activity  Alcohol use: No  
  Alcohol/week: 0.0 oz  Drug use: No  
 Sexual activity: Not Currently Lifestyle  Physical activity:  
  Days per week: Not on file Minutes per session: Not on file  Stress: Not on file Relationships  Social connections:  
  Talks on phone: Not on file Gets together: Not on file Attends Confucianist service: Not on file Active member of club or organization: Not on file Attends meetings of clubs or organizations: Not on file Relationship status: Not on file  Intimate partner violence:  
  Fear of current or ex partner: Not on file Emotionally abused: Not on file Physically abused: Not on file Forced sexual activity: Not on file Other Topics Concern  Not on file Social History Narrative  Not on file ALLERGIES: Patient has no known allergies. Review of Systems Unable to perform ROS: Patient nonverbal  
 
 
Vitals:  
 06/19/19 1235 BP: 118/62 Pulse: 90 Resp: 24 Temp: 97.1 °F (36.2 °C) SpO2: 94% Weight: 53.4 kg (117 lb 11.6 oz) Physical Exam  
Constitutional: She appears well-developed and well-nourished. She appears lethargic. No distress.   
HENT:  
 Head: Normocephalic and atraumatic. Eyes: Pupils are equal, round, and reactive to light. Conjunctivae and EOM are normal.  
Neck: Neck supple. Cardiovascular: Normal rate, regular rhythm and normal heart sounds. Pulmonary/Chest: Effort normal and breath sounds normal.  
Abdominal: Soft. She exhibits no distension. Musculoskeletal: She exhibits no edema or deformity. Neurological: She appears lethargic. GCS eye subscore is 4. GCS verbal subscore is 1. GCS motor subscore is 4. Somnolent. Arrousable to painful stimuli. Will not speak or answer questions. Skin: Skin is warm and dry. She is not diaphoretic. Nursing note and vitals reviewed. Note written by Rula Cortes, as dictated by Kong Drew DO 12:39 PM 
 
MDM 
  
71 y.o. female with hx of prior CVA and seizures presents postictal after breakthrough seizure. Labs show WBC 11.8, plt 765, possibly acute phase reactant after seizure, Cr 1.35, BUN 26, gluc 194, given IVF bolus. Normal electrolytes and bicarb. CT head neg. No noted seizure activity in the ED. Procedures 1:56 PM 
Patient reevaluated. She is significantly more alert and responsive,answering questions appropriately. Intact sensation and strength to all 4 extremities. Waiting for CT results. 2:30 PM 
Dr. Sachi Barron here to see the patient. States if the patient is back to baseline, she can be discharged home with an increase in her keppra. She will review the results and come back with her recommendations shortly. Reevaluated patient and found to be back to neurologic baseline without focal deficits. 2:42 PM 
Dr. Sachi Barron agrees with discharging the patient home with 1,000mg keppra BID and outpatient neurology F/U as she is back at her neurologic baseline.

## 2019-06-20 ENCOUNTER — OFFICE VISIT (OUTPATIENT)
Dept: FAMILY MEDICINE CLINIC | Age: 64
End: 2019-06-20

## 2019-06-20 VITALS
HEIGHT: 65 IN | SYSTOLIC BLOOD PRESSURE: 112 MMHG | TEMPERATURE: 97.4 F | DIASTOLIC BLOOD PRESSURE: 63 MMHG | HEART RATE: 70 BPM | RESPIRATION RATE: 16 BRPM | OXYGEN SATURATION: 96 % | BODY MASS INDEX: 19.59 KG/M2

## 2019-06-20 DIAGNOSIS — R56.9 SEIZURE (HCC): ICD-10-CM

## 2019-06-20 DIAGNOSIS — I63.9 CEREBROVASCULAR ACCIDENT (CVA), UNSPECIFIED MECHANISM (HCC): ICD-10-CM

## 2019-06-20 DIAGNOSIS — Z15.89 JAK-2 GENE MUTATION: ICD-10-CM

## 2019-06-20 DIAGNOSIS — Z09 HOSPITAL DISCHARGE FOLLOW-UP: Primary | ICD-10-CM

## 2019-06-20 DIAGNOSIS — D75.839 THROMBOCYTOSIS: ICD-10-CM

## 2019-06-20 DIAGNOSIS — I63.511 CEREBRAL INFARCTION DUE TO STENOSIS OF RIGHT MIDDLE CEREBRAL ARTERY (HCC): ICD-10-CM

## 2019-06-20 NOTE — PATIENT INSTRUCTIONS
Overall, he had a complex hospitalization. For your strokes, continue taking the aspirin and Plavix daily. 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 You were placed on hydroxyurea for this so please continue this medication too. I have placed a new referral for a blood doctor in the [employer] (covered by the care card) to work with you on this as well. For your seizures, you were put on Keppra but ended up having another seizure and seen in the emergency room after your hospitalization. You are placed on a much higher dose of Keppra at 1000 mg twice daily. We can continue this for now but sometimes this can lead to too high of a dose with other side effects. We will need to recheck your labs to make sure the dose is in the right range and have you see your neurologist soon to make adjustments as appropriate. For your diabetes, your sugars are running a little bit high based on your home readings. With all the new changes of the recent stroke and seizures, we can hold off on increasing your Lantus at this time but plan to make these changes at your next appointment. For your blood pressure, your readings look good at this point. Please continue checking blood pressures to make sure that they do not run too low or too high. For your big toe pain, it appears you have an ingrown toenail that is not currently infected. You also have a fungal infection in the nails. This is a little bit complex because in order to remove the nail or perform a procedure to cut the edge of the nail off, we usually would want you off your blood thinners to avoid excessive bleeding. We cannot do this because of your recent stroke. I would also avoid using pills to treat your fungal infection because of the potential for toxicity to the liver at this time.   For now, let us use an over-the-counter medication called clotrimazole that you can apply twice daily to the areas of concern. Over many many months, this may help with the fungal infection in the nails but there are no guarantees it will work.

## 2019-06-20 NOTE — PROGRESS NOTES
Subjective:     Chief Complaint   Patient presents with   [facility] Follow Up     Discharged from [address] 6/18/19        She  is a 71 y.o. female who presents for evaluation of: Son in law translates for pt. Here for hosp f/u d/c for new CVA with seizure. Admitted from 6/13/2019 through 6/18/2019. Ended up going back to the emergency room on 6/19/2019 for a breakthrough seizure. During admission, seen by Dr. Fely Molina and had MRI brain, EEG, and also noted severe stenosis of the right MCA. Also saw Dr. Marbella Narvaez for thrombocytosis with abnormal Marcello 2 mutation which was a newer issue for recently. Lab review shows thrombocytosis started between 2016 and 2018. Platelets mostly stayed between 400-600 but did jump as high as 765 on 6/19/19. She also had an abnormal platelet function test that was run in relation to having a new stroke while on Plavix. She continues on aspirin and Plavix and was also put on hydroxyurea as well. MRI brain:  1. Acute small right extreme posterior corona radiata infarct along the anterior lateral aspect of the right atria of the lateral ventricle. 2. Chronic findings of encephalomalacia, volume loss and chronic small vessel ischemic changes again noted. She also had in abnormal EEG with left temporal sharp waves      Uncontrolled DM 2 - ongoing issue for many years. Sugars recently have been fairly well controlled in the 1  range.   Most recent A1c was 8.0% on 6/13/2019 after working significantly on regular insulin    Lab Results   Component Value Date/Time    Hemoglobin A1c 8.0 (H) 06/13/2019 12:07 PM    Microalbumin/Creat ratio (mg/g creat) 7 06/08/2017 02:16 PM    Microalbumin,urine random 1.03 06/08/2017 02:16 PM    LDL,Direct 119 (H) 01/25/2013 03:10 PM    LDL, calculated 61.8 06/13/2019 02:26 PM    Creatinine (POC) 0.6 08/26/2010 08:05 PM    Creatinine 1.35 (H) 06/19/2019 12:41 PM      Lab Results   Component Value Date/Time    GFR est AA 48 (L) 06/19/2019 12:41 PM    GFR est non-AA 39 (L) 06/19/2019 12:41 PM      Lab Results   Component Value Date/Time    TSH 1.520 06/13/2019 12:07 PM         ROS  Gen - no fever/chills  Resp - no dyspnea or cough  CV - no chest pain or OLIVA  Rest per HPI    Past Medical History:   Diagnosis Date    Diabetes ([address]     Hypertension     Stroke [facility])      History reviewed. No pertinent surgical history. Current Outpatient Medications on File Prior to Visit   Medication Sig Dispense Refill    levETIRAcetam 1,000 mg tablet Take 1 Tab by mouth two (2) times a day for 30 days. 60 Tab 0    hydroxyurea (HYDREA) 500 mg capsule Take 2 Caps by mouth daily for 90 days. 60 Cap 2    diclofenac (VOLTAREN) 1 % gel Apply  to affected area four (4) times daily. 100 g 0    amLODIPine (NORVASC) 5 mg tablet Take 1 Tab by mouth daily. 30 Tab 2    clopidogrel (PLAVIX) 75 mg tab Take 1 Tab by mouth daily. 30 Tab 5    insulin glargine (LANTUS,BASAGLAR) 100 unit/mL (3 mL) inpn 20 Units by SubCUTAneous route daily. 6 mL 2    metoprolol tartrate (LOPRESSOR) 50 mg tablet Take 1 Tab by mouth two (2) times a day. 30 Tab 1    atorvastatin (LIPITOR) 10 mg tablet Take 1 Tab by mouth daily. 30 Tab 2    metFORMIN ER (GLUCOPHAGE XR) 500 mg tablet TAKE ONE TABLET BY MOUTH IN THE MORNING AND TWO AT NIGHT FOR ONE WEEK AND THEN TWO TABLETS IN THE MORNING AND TWO TABLETS AT NIGHT 120 Tab 5    lisinopril-hydroCHLOROthiazide (PRINZIDE, ZESTORETIC) 20-25 mg per tablet Take 2 Tabs by mouth daily. 60 Tab 5    aspirin delayed-release 81 mg tablet Take 1 Tab by mouth daily. 30 Tab 1     No current facility-administered medications on file prior to visit.          Objective:     Vitals:    06/20/19 1122   BP: 112/63   Pulse: 70   Resp: 16   Temp: 97.4 °F (36.3 °C)   TempSrc: Oral   SpO2: 96%   Height: 5' 5\" (1.651 m)     Physical Examination:  General appearance - alert, in no distress, in wheelchair  Eyes -sclera anicteric  Neck - supple, no significant adenopathy, no thyromegaly  Chest - clear to auscultation, no wheezes, rales or rhonchi, symmetric air entry  Heart - normal rate, regular rhythm, normal S1, S2, no murmurs, rubs, clicks or gallops  Neurological - alert, oriented, poorly verbal, 5-/5 strength in RUE and LLE, abnormal gait and balance  Extr - no edema  Psych-pleasant when engaged    Assessment/ Plan:   Diagnoses and all orders for this visit:    1. Hospital discharge follow-up- patient with significant new CVA and seizures. She is doing well since ER visit yesterday with no new seizures after Keppra was increased to 1000 mg twice daily in the ER. 2. Cerebrovascular accident (CVA), unspecified mechanism ([address]  3. Cerebral infarction due to stenosis of right middle cerebral artery ([address]  4. Seizure ([address]  - Continue on aspirin, Plavix, Lipitor, and recently increased Keppra to 1000 mg twice daily. To follow-up with Dr. Sue Vallejo has information to make appointment    5. Thrombocytosis ([address]  6. OMAR-2 gene mutation  - Seen by Dr. Molina Colón, recently started on hydroxyurea. Continues on aspirin and Plavix. She currently has the [facility] care card and no other form of insurance so we will send to Dr. Marcella Montes De Oca in the [employer] network instead for follow-up    I have discussed the diagnosis with the patient and the intended plan as seen in the above orders. The patient has received an after-visit summary and questions were answered concerning future plans. I have discussed medication side effects and warnings with the patient as well. The patient verbalizes understanding and agreement with the plan. Follow-up and Dispositions    · Return in about 1 month (around 7/18/2019) for Regular Follow up.

## 2019-06-20 NOTE — PROGRESS NOTES
Chief Complaint   Patient presents with   [facility] Follow Up     Discharged from [facility] 6/18/19   1. Have you been to the ER, urgent care clinic since your last visit? Hospitalized since your last visit? Yes Where: [facility] 6/19/19    2. Have you seen or consulted any other health care providers outside of the [address] [place] [address] since your last visit? Include any pap smears or colon screening.  No   Was hospitalized 6/13/19-6/18/19 TIA and seizure

## 2019-06-21 ENCOUNTER — TELEPHONE (OUTPATIENT)
Dept: FAMILY MEDICINE CLINIC | Age: 64
End: 2019-06-21

## 2019-06-21 ENCOUNTER — HOME CARE VISIT (OUTPATIENT)
Dept: HOME HEALTH SERVICES | Facility: HOME HEALTH | Age: 64
End: 2019-06-21

## 2019-06-21 NOTE — TELEPHONE ENCOUNTER
Cristopher calling from [facility] on PT order - two visits have been turned down   Dtr not answering phone   Should they try again next week or is patient stable?      Best number to reach him is [phone]

## 2019-06-25 ENCOUNTER — HOME CARE VISIT (OUTPATIENT)
Dept: HOME HEALTH SERVICES | Facility: HOME HEALTH | Age: 64
End: 2019-06-25

## 2019-07-25 ENCOUNTER — OFFICE VISIT (OUTPATIENT)
Dept: FAMILY MEDICINE CLINIC | Age: 64
End: 2019-07-25

## 2019-07-25 VITALS
WEIGHT: 104.6 LBS | BODY MASS INDEX: 17.43 KG/M2 | TEMPERATURE: 97.3 F | HEIGHT: 65 IN | RESPIRATION RATE: 16 BRPM | SYSTOLIC BLOOD PRESSURE: 118 MMHG | DIASTOLIC BLOOD PRESSURE: 64 MMHG | OXYGEN SATURATION: 96 % | HEART RATE: 72 BPM

## 2019-07-25 DIAGNOSIS — R56.9 SEIZURE (HCC): ICD-10-CM

## 2019-07-25 DIAGNOSIS — I63.511 CEREBRAL INFARCTION DUE TO STENOSIS OF RIGHT MIDDLE CEREBRAL ARTERY (HCC): Primary | ICD-10-CM

## 2019-07-25 DIAGNOSIS — R94.4 DECREASED GFR: ICD-10-CM

## 2019-07-25 DIAGNOSIS — D75.839 THROMBOCYTOSIS: ICD-10-CM

## 2019-07-25 DIAGNOSIS — N18.30 STAGE 3 CHRONIC KIDNEY DISEASE (HCC): ICD-10-CM

## 2019-07-25 DIAGNOSIS — Z15.89 JAK-2 GENE MUTATION: ICD-10-CM

## 2019-07-25 NOTE — PROGRESS NOTES
Chief Complaint   Patient presents with    Neurologic Problem     Follow-up CVA   1. Have you been to the ER, urgent care clinic since your last visit? Hospitalized since your last visit? No    2. Have you seen or consulted any other health care providers outside of the [address] [place] [address] since your last visit? Include any pap smears or colon screening.  No   Discuss weight loss and apetite

## 2019-07-25 NOTE — PROGRESS NOTES
Subjective:     Chief Complaint   Patient presents with    Neurologic Problem     Follow-up CVA        She  is a 71 y.o. female who presents for evaluation of: Son in law translates for pt. Here for f/u. Prior to her last appointment, she was admitted for new CVA with seizure. She is doing much better since her last appointment and continues to be more verbal and more able to be active. She has not had any breakthrough seizures. Son tells me that she was even able to walk downstairs and open the door. During admission, seen by Dr. Marylee Somerset and had MRI brain, EEG, and also noted severe stenosis of the right MCA. Also saw Dr. Macho Ferro for thrombocytosis with abnormal Marcello 2 mutation which was a newer issue for recently. Since she has the care card, we will set her up with Dr. Marvin Brantley in the [facility]. Lab review shows thrombocytosis started between 2016 and 2018. Platelets mostly stayed between 400-600 but did jump as high as 765 on 6/19/19. She also had an abnormal platelet function test that was run in relation to having a new stroke while on Plavix. She continues on aspirin and Plavix and was also put on hydroxyurea as well. Uncontrolled DM 2 - ongoing issue for many years and improving recently since hospitalization. Sugars recently have been fairly well controlled in the 150-200 range.     Lab Results   Component Value Date/Time    Hemoglobin A1c 8.0 (H) 06/13/2019 12:07 PM    Microalbumin/Creat ratio (mg/g creat) 7 06/08/2017 02:16 PM    Microalbumin,urine random 1.03 06/08/2017 02:16 PM    LDL,Direct 119 (H) 01/25/2013 03:10 PM    LDL, calculated 61.8 06/13/2019 02:26 PM    Creatinine (POC) 0.6 08/26/2010 08:05 PM    Creatinine 1.35 (H) 06/19/2019 12:41 PM      Lab Results   Component Value Date/Time    GFR est AA 48 (L) 06/19/2019 12:41 PM    GFR est non-AA 39 (L) 06/19/2019 12:41 PM      Lab Results   Component Value Date/Time    TSH 1.520 06/13/2019 12:07 PM         ROS  Gen - no fever/chills  Resp - no dyspnea or cough  CV - no chest pain or OLIVA  Rest per HPI    Past Medical History:   Diagnosis Date    Diabetes ([facility]     Hypertension     Stroke [facility])      History reviewed. No pertinent surgical history. Current Outpatient Medications on File Prior to Visit   Medication Sig Dispense Refill    hydroxyurea (HYDREA) 500 mg capsule Take 2 Caps by mouth daily for 90 days. 60 Cap 2    diclofenac (VOLTAREN) 1 % gel Apply  to affected area four (4) times daily. 100 g 0    amLODIPine (NORVASC) 5 mg tablet Take 1 Tab by mouth daily. 30 Tab 2    clopidogrel (PLAVIX) 75 mg tab Take 1 Tab by mouth daily. 30 Tab 5    insulin glargine (LANTUS,BASAGLAR) 100 unit/mL (3 mL) inpn 20 Units by SubCUTAneous route daily. 6 mL 2    metoprolol tartrate (LOPRESSOR) 50 mg tablet Take 1 Tab by mouth two (2) times a day. 30 Tab 1    atorvastatin (LIPITOR) 10 mg tablet Take 1 Tab by mouth daily. 30 Tab 2    metFORMIN ER (GLUCOPHAGE XR) 500 mg tablet TAKE ONE TABLET BY MOUTH IN THE MORNING AND TWO AT NIGHT FOR ONE WEEK AND THEN TWO TABLETS IN THE MORNING AND TWO TABLETS AT NIGHT 120 Tab 5    lisinopril-hydroCHLOROthiazide (PRINZIDE, ZESTORETIC) 20-25 mg per tablet Take 2 Tabs by mouth daily. 60 Tab 5    aspirin delayed-release 81 mg tablet Take 1 Tab by mouth daily. 30 Tab 1     No current facility-administered medications on file prior to visit.          Objective:     Vitals:    07/25/19 1032   BP: 118/64   Pulse: 72   Resp: 16   Temp: 97.3 °F (36.3 °C)   TempSrc: Oral   SpO2: 96%   Weight: 104 lb 9.6 oz (47.4 kg)   Height: 5' 5\" (1.651 m)     Physical Examination:  General appearance - alert, in no distress, in manual wheelchair  Eyes -sclera anicteric  Neck - supple, no significant adenopathy, no thyromegaly  Chest - clear to auscultation, no wheezes, rales or rhonchi, symmetric air entry  Heart - normal rate, regular rhythm, normal S1, S2, no murmurs, rubs, clicks or gallops  Neurological - alert, oriented, poorly verbal but more verbal today, 5-/5 strength in RUE and LLE, improved gait and balance  Extr - no edema  Psych-pleasant when engaged    Assessment/ Plan:   Diagnoses and all orders for this visit:    1. Cerebral infarction due to stenosis of right middle cerebral artery ([facility]  -doing well and having improvement in function, following with neurology  - Continue on aspirin, Plavix, Lipitor  -     METABOLIC PANEL, BASIC    2. Seizure (HCC)-improving with no breakthrough seizures, following with neurology    3. Thrombocytosis ([facility]  4. OMAR-2 gene mutation  - Platelets improving with hydroxyurea, sending to Dr. Patti Chung for follow-up  -  Continues on aspirin and Plavix also  -     CBC WITH AUTOMATED DIFF    5. Stage 3 chronic kidney disease ([facility]- rechecking labs  6. Decreased GFR  -     METABOLIC PANEL, BASIC    I have discussed the diagnosis with the patient and the intended plan as seen in the above orders. The patient has received an after-visit summary and questions were answered concerning future plans. I have discussed medication side effects and warnings with the patient as well. The patient verbalizes understanding and agreement with the plan. Follow-up and Dispositions    · Return in about 3 months (around 10/25/2019).

## 2019-07-25 NOTE — PATIENT INSTRUCTIONS
You have been referred to:  Sherrie Goff  [address]  [address] [place] [address]  Phone: [phone]  Fax: [phone]

## 2019-08-14 NOTE — CONSULTS
[address] Name:  Quentin Frazier 
MR#:  [id number] :  1955 ACCOUNT #:  [de-identified] DATE OF SERVICE:  2019 NEUROLOGY CONSULTATION 
 
HISTORY OF PRESENT ILLNESS:  This 26-year-old right-handed female was admitted on  for episode of syncope lasting for 1-2 minutes. All of this history was obtained from the chart notes. The patient currently has limited verbal output, but also does not speak [place]. She is originally from [place] and apparently her dialect of her native language is not available on the  phone. It is also noted that the patient is illiterate in her own language, which likely contributes as well. There is no family at the bedside currently. According to chart notes, she had been seen at her primary care provider's office at approximately 1:30 this afternoon and then her son-in-law was giving her a ride home when she suddenly had loss of consciousness in the car lasting 1-2 minutes. When EMS arrived, she was vomiting and nonverbal.  Her CT of the head did not show any acute changes. Her CTA of the head and neck shows new severe right MCA stenosis at the origin compared to her 2019 CTA. She also has a stable severe right MCA M2 and right PCA P2 stenosis as well as diffuse atherosclerosis. CTP was unremarkable. MRI of the brain reveals an acute right posterior corona radiata infarct adjacent to the right atria of the lateral ventricle. The patient is well known to me from her admission in 2018, at which time, she was found to have an acute right basal ganglia infarct as well as focal onset seizure involving the left side with postictal Cade's. She was started on aspirin during that admission. She was also noted on imaging during that admission to have a new since 2016, but chronic right frontoparietal and temporal infarcts.   The patient re-presented in 2019 with a second acute right basal ganglia infarct and Plavix was added to her aspirin. She is currently taking aspirin 81 mg a day and Plavix 75 mg a day. The patient has stroke risk factors of diabetes with a hemoglobin A1c of 8, hypertension presenting with blood pressure of 192/90; no known history of hyperlipidemia, but was placed on Lipitor during her January admission, her LDL is currently 61.8. Her echocardiogram in January revealed an EF of 70% without an atrial septal defect or PFO. She has thrombocytosis with a platelet count of 781, increased to 707 yesterday. Hematology has been consulted to see the patient. PAST MEDICAL HISTORY: 
1.  Diabetes. 2.  Hypertension. 3.  Multiple strokes as described above. 4.  Focal onset seizure involving her left side. 5.  Osteoarthritis in the right knee. MEDICATIONS AT HOME:  Aspirin 81 mg, Plavix 75 mg, Keppra 500 mg b.i.d., Lipitor 10 mg a day. ALLERGIES:  NONE. SOCIAL HISTORY:  She is . She lives with her daughter and son-in-law. She is originally from \[facility]\"". No tobacco, alcohol or drug use. FAMILY HISTORY:  No neurological diseases run in her family. REVIEW OF SYSTEMS:  Cannot be obtained secondary to patient's language barrier. PHYSICAL EXAMINATION: 
GENERAL:  She is a thin female, who appears older than her stated age, standing beside the bed, holding on to Shannan Adamson, the nurse, in no distress. VITAL SIGNS:  Blood pressure currently 165/84, pulse 78, respiratory rate 16, saturating 96% on room air, temperature is 97.6, BMI of 19. HEART:  Her heart has regular rate and rhythm without murmurs. Her carotids are 2+, no bruits. EXTREMITIES:  Warm. She has 2+ radial pulses. NEUROLOGIC:  Her neurological examination is severely limited due to patient factors. The patient states, \"I am going to go. \"  The patient does not follow commands. She resists the exam.  Cranial nerves:  She has no obvious facial asymmetry or ptosis.   Her extraocular eye movements appear to be intact to spontaneous movement. Her pupils are round and reactive. She does not open her mouth. Unable to assess hearing sensation, trapezius, or sternocleidomastoid. Motor exam:  The patient appears to be using all extremities very well, noted to be gripping the nurse tightly with both hands, would not activate muscles when asked. Sensory exam could not be assessed. Reflexes, again testing limited due to patient's positioning and uncooperative patient but were symmetric in the upper extremities. Coordination, sensation, and gait could not be assessed though nursing staff witnessed the patient walk to the elevator on her own. STUDIES AND REPORTS:  In addition to that mentioned above, her TSH is 1.5, lactic acid 2.7. ASSESSMENT AND PLAN:  This is a 76-year-old right-handed female with diabetes, hypertension, admitted with her third known stroke in one year despite being on aspirin 81 mg a day and Plavix 75 mg a day at home, noted to have severe right MCA stenosis at the origin, new since 01/2019 as well as other multifocal areas of atherosclerosis and stenosis. Continue dual anti-platelet agents. Will check a P2Y12 and aspirin test in the morning. Again, she has thrombocytosis and Heme-Onc has been consulted. Neuro Interventional Surgery apparently reviewed her imaging and did not feel that acute intervention on this tight stenosis of her right MCA was appropriate at this time. The patient will need PT, OT, and a palliative consult might be appropriate. Que Meehan MD 
 
 
[id number] 
D:  06/14/2019 21:51 T:  06/15/2019 0:47 JOB #:  [id number] no chest pain and no edema.

## 2019-10-28 ENCOUNTER — OFFICE VISIT (OUTPATIENT)
Dept: ENDOCRINOLOGY | Age: 64
End: 2019-10-28

## 2019-10-28 VITALS
HEIGHT: 65 IN | BODY MASS INDEX: 17.41 KG/M2 | RESPIRATION RATE: 16 BRPM | SYSTOLIC BLOOD PRESSURE: 138 MMHG | OXYGEN SATURATION: 97 % | HEART RATE: 70 BPM | DIASTOLIC BLOOD PRESSURE: 78 MMHG | TEMPERATURE: 96.5 F

## 2019-10-28 DIAGNOSIS — E11.21 TYPE 2 DIABETES WITH NEPHROPATHY (HCC): Primary | ICD-10-CM

## 2019-10-28 DIAGNOSIS — E78.5 HYPERLIPIDEMIA LDL GOAL <70: ICD-10-CM

## 2019-10-28 DIAGNOSIS — I10 ESSENTIAL HYPERTENSION WITH GOAL BLOOD PRESSURE LESS THAN 140/90: ICD-10-CM

## 2019-10-28 RX ORDER — SYRINGE AND NEEDLE,INSULIN,1ML 31GX15/64"
1 SYRINGE, EMPTY DISPOSABLE MISCELLANEOUS DAILY
Qty: 100 PEN NEEDLE | Refills: 3 | Status: SHIPPED | OUTPATIENT
Start: 2019-10-28 | End: 2020-01-01 | Stop reason: ALTCHOICE

## 2019-10-28 RX ORDER — AMLODIPINE BESYLATE 5 MG/1
5 TABLET ORAL DAILY
Qty: 30 TAB | Refills: 11 | Status: SHIPPED | OUTPATIENT
Start: 2019-10-28 | End: 2019-11-29

## 2019-10-28 RX ORDER — METFORMIN HYDROCHLORIDE 500 MG/1
TABLET, EXTENDED RELEASE ORAL
Qty: 60 TAB | Refills: 11 | Status: SHIPPED | OUTPATIENT
Start: 2019-10-28 | End: 2020-01-01 | Stop reason: SDUPTHER

## 2019-10-28 RX ORDER — LISINOPRIL AND HYDROCHLOROTHIAZIDE 20; 25 MG/1; MG/1
2 TABLET ORAL DAILY
Qty: 60 TAB | Refills: 11 | Status: SHIPPED | OUTPATIENT
Start: 2019-10-28 | End: 2019-11-29

## 2019-10-28 RX ORDER — DICLOFENAC SODIUM 10 MG/G
GEL TOPICAL 4 TIMES DAILY
Qty: 100 G | Refills: 0 | Status: CANCELLED | OUTPATIENT
Start: 2019-10-28

## 2019-10-28 RX ORDER — HYDROXYUREA 500 MG/1
1000 CAPSULE ORAL DAILY
COMMUNITY
End: 2020-01-01

## 2019-10-28 RX ORDER — METOPROLOL TARTRATE 25 MG/1
50 TABLET, FILM COATED ORAL DAILY
COMMUNITY
End: 2019-10-28

## 2019-10-28 RX ORDER — METOPROLOL TARTRATE 50 MG/1
50 TABLET ORAL DAILY
Qty: 30 TAB | Refills: 11 | Status: SHIPPED | OUTPATIENT
Start: 2019-10-28 | End: 2019-11-29

## 2019-10-28 NOTE — PROGRESS NOTES
Chief Complaint Patient presents with  New Patient  Diabetes History of Present Illness: Annmarie Milian is a 71 y.o. female who is a new patient for evaluation of diabetes. History is provided by her daughter, Richard Thomason, as she is from [place] and doesn't speak English and also is less verbal since her 2 strokes earlier this year. Was diagnosed with diabetes in 2010. Was started on insulin in Feb 2014 and was on levemir at one point and then changed to 70/30 insulin but in 2/19, Dr. Riley Doe note states to start lantus but her daughter, doesn't think she ever took this as she doesn't have insurance and therefore wouldn't be able to afford lantus. She is currently on regular insulin per the scale below. Is prescribed metformin  mg 2 tabs bid but her daughter states usually she only gets 2-3 per day as she doesn't like to take a lot of pills. Her daughter checks her sugar in the morning and can see between 200-300 fasting but was 189 this morning and can be down to 150 at the lowest and rarely under 100. Most recent Hgb A1c was 8% in June during her 2nd admission for stroke (1st was in 1/19). Usually tends to eat veggies in her soup, occ bread, no rice and potatoes. Her med list was updated as best as possible to reflect what she is currently taking. Past Medical History:  
Diagnosis Date  Diabetes ([facility]  Hypertension  [facility] History reviewed. No pertinent surgical history. Current Outpatient Medications Medication Sig  
 hydroxyurea (HYDREA) 500 mg capsule Take 1,000 mg by mouth daily.  metoprolol tartrate (LOPRESSOR) 25 mg tablet Take 50 mg by mouth daily.  insulin regular (NOVOLIN R REGULAR U-100 INSULN) 100 unit/mL injection by SubCUTAneous route. 150-200=none, 201-250=2 units, 251-300=4 units, 301-35=6 units, 351-400=8 units, 401 or greater = call MD  
 amLODIPine (NORVASC) 5 mg tablet Take 1 Tab by mouth daily.  metFORMIN ER (GLUCOPHAGE XR) 500 mg tablet TAKE ONE TABLET BY MOUTH IN THE MORNING AND TWO AT NIGHT FOR ONE WEEK AND THEN TWO TABLETS IN THE MORNING AND TWO TABLETS AT NIGHT  lisinopril-hydroCHLOROthiazide (PRINZIDE, ZESTORETIC) 20-25 mg per tablet Take 2 Tabs by mouth daily.  aspirin delayed-release 81 mg tablet Take 1 Tab by mouth daily. No current facility-administered medications for this visit. No Known Allergies Family History Problem Relation Age of Onset  No Known Problems Mother  No Known Problems Father Social History Socioeconomic History  Marital status:  Spouse name: Not on file  Number of children: Not on file  Years of education: Not on file  Highest education level: Not on file Occupational History  Not on file Social Needs  Financial resource strain: Not on file  Food insecurity:  
  Worry: Not on file Inability: Not on file  Transportation needs:  
  Medical: Not on file Non-medical: Not on file Tobacco Use  Smoking status: Never Smoker  Smokeless tobacco: Never Used Substance and Sexual Activity  Alcohol use: No  
  Alcohol/week: 0.0 standard drinks  Drug use: No  
 Sexual activity: Not Currently Lifestyle  Physical activity:  
  Days per week: Not on file Minutes per session: Not on file  Stress: Not on file Relationships  Social connections:  
  Talks on phone: Not on file Gets together: Not on file Attends Islam service: Not on file Active member of club or organization: Not on file Attends meetings of clubs or organizations: Not on file Relationship status: Not on file  Intimate partner violence:  
  Fear of current or ex partner: Not on file Emotionally abused: Not on file Physically abused: Not on file Forced sexual activity: Not on file Other Topics Concern  Not on file Social History Narrative Lives in the [place] [address] with her daughter, Mariel Robin, and son-in-law Ronnie Santos and granddaughter, Addy Mortensen who is 27. Originally from [facility] and then moved from [place]. She has 2 other daughters and 2 sons. Review of Systems: unable to obtain due to language barrier Physical Examination: 
Blood pressure 138/78, pulse 70, temperature 96.5 °F (35.8 °C), temperature source Oral, resp. rate 16, height 5' 5\" (1.651 m), SpO2 97 %. - General: pleasant, no distress, fair eye contact 
- HEENT: no exopthalmos, no periorbital edema, no scleral/conjunctival injection, EOMI, no lid lag or stare 
- Neck: supple, no thyromegaly, masses, lymph nodes, or carotid bruits, no supraclavicular or dorsocervical fat pads - Cardiovascular: regular, normal rate, normal S1 and S2, no murmurs/rubs/gallops, 
- Respiratory: clear to auscultation bilaterally - Gastrointestinal: soft, nontender, nondistended, no masses, no hepatosplenomegaly - Musculoskeletal: mild proximal muscle weakness in left upper and lower extremities - Integumentary: no acanthosis nigricans, no rashes, no edema,  
- Neurological: grossly non-focal 
- Psychiatric: normal mood and affect Data Reviewed:  
Component Latest Ref Rng & Units 6/13/2019  
 
     12:07 PM  
Hemoglobin A1c, (calculated) 4.8 - 5.6 % 8.0 (H) Estimated average glucose 
    mg/dL 183 Component Latest Ref Rng & Units 6/19/2019 6/13/2019  
 
     12:41 PM  2:26 PM  
Sodium 136 - 145 mmol/L 138 Potassium 3.5 - 5.1 mmol/L 3.8 Chloride 97 - 108 mmol/L 105 CO2 
    21 - 32 mmol/L 25 Anion gap 5 - 15 mmol/L 8 Glucose 65 - 100 mg/dL 194 (H) BUN 
    6 - 20 MG/DL 26 (H) Creatinine 
    0.55 - 1.02 MG/DL 1.35 (H) BUN/Creatinine ratio 12 - 20   19 GFR est AA 
    >60 ml/min/1.73m2 48 (L) GFR est non-AA 
    >60 ml/min/1.73m2 39 (L) Calcium 8.5 - 10.1 MG/DL 8.7 Bilirubin, total 
 0.2 - 1.0 MG/DL 0.3 ALT (SGPT) 12 - 78 U/L 59 AST 
    15 - 37 U/L 35 Alk. phosphatase 45 - 117 U/L 89 Protein, total 
    6.4 - 8.2 g/dL 7.6 Albumin 3.5 - 5.0 g/dL 3.6 Globulin 2.0 - 4.0 g/dL 4.0 A-G Ratio 1.1 - 2.2   0.9 (L) Cholesterol, total 
    <200 MG/DL  115 Triglyceride 
    <150 MG/DL  101 HDL Cholesterol MG/DL  33 LDL, calculated 0 - 100 MG/DL  61.8 VLDL, calculated MG/DL  20.2 CHOL/HDL Ratio 
    0.0 - 5.0    3.5 Assessment/Plan: 1. Type 2 diabetes with nephropathy [facility]): her most recent Hgb A1c was 8% in 6/19, which is the lowest it's been since 2010 with all values between 8.4-14.9%. Given the language barrier, I want to keep her diabetes regimen as simple as possible and given she is without insurance, I want to keep her cost down so will start basal insulin with once daily NPH in the morning and continue metformin for now. May need to change to bid NPH in the future. Will focus on trying to keep her sugars under 200 fasting as an initial goal over the next month. - cont metformin  mg 1 tab bid - begin NPH 10 units every morning 
- stop Regular insulin correction scale - check blood sugar once daily 
- will need foot exam at next visit - check A1c at next visit 2. Essential hypertension with goal blood pressure less than 140/90: her BP was at goal < 140/90 
- cont metoprolol 50 mg daily 
- cont amlodipine 5 mg daily 
- cont lisinopril-hctz 20/25 mg 2 tabs daily 3. Hyperlipidemia LDL goal <70: LDL 61 in 6/19. Not currently on a statin but given history of stroke will likely benefit from one in the future. - check lipids at next visit Patient Instructions 1) I will send NPH insulin to [facility] to buy one vial of insulin and you will take 10 units every morning. See if this dose keeps your sugars under 200 before breakfast.  If so, then this is the right dose.   If after 4 days your sugars are staying over 200 in the morning, then go up by 2 units every 4 days as needed to get to the dose that keeps you under 200. 
 
2) Stop using the Novolin R on the scale for now. 3) Just take metformin 1 tab with breakfast and dinner. 4) Bring a list of her blood sugars readings in the morning and what she ate the night before. Follow-up and Dispositions · Return for 11/29/19 at 12:10pm. 
  
 
 
 
 
Copy sent to: 
Manoj Connell MD

## 2019-10-28 NOTE — PROGRESS NOTES
Identified pt with two pt identifiers(name and ). Reviewed record in preparation for visit and have obtained necessary documentation. Chief Complaint Patient presents with  New Patient Visit Vitals /78 (BP 1 Location: Left arm, BP Patient Position: Sitting) Pulse 70 Temp 96.5 °F (35.8 °C) (Oral) Resp 16 Ht 5' 5\" (1.651 m) SpO2 97% BMI 17.41 kg/m² Pain Scale: 0 - No pain/10 Pain Location:  
 
1) Have you been to an emergency room, urgent care, or hospitalized since your last visit? If yes, where when, and reason for visit? no  
 
 
2. Have seen or consulted any other health care provider since your last visit? If yes, where when, and reason for visit?   NO

## 2019-10-30 ENCOUNTER — OFFICE VISIT (OUTPATIENT)
Dept: FAMILY MEDICINE CLINIC | Age: 64
End: 2019-10-30

## 2019-10-30 VITALS
DIASTOLIC BLOOD PRESSURE: 68 MMHG | WEIGHT: 107.8 LBS | HEIGHT: 65 IN | OXYGEN SATURATION: 97 % | SYSTOLIC BLOOD PRESSURE: 119 MMHG | TEMPERATURE: 96.8 F | RESPIRATION RATE: 16 BRPM | BODY MASS INDEX: 17.96 KG/M2 | HEART RATE: 72 BPM

## 2019-10-30 DIAGNOSIS — Z86.73 HISTORY OF CVA (CEREBROVASCULAR ACCIDENT): ICD-10-CM

## 2019-10-30 DIAGNOSIS — Z79.899 ENCOUNTER FOR LONG-TERM (CURRENT) USE OF MEDICATIONS: ICD-10-CM

## 2019-10-30 DIAGNOSIS — I10 ESSENTIAL HYPERTENSION WITH GOAL BLOOD PRESSURE LESS THAN 140/90: Primary | ICD-10-CM

## 2019-10-30 DIAGNOSIS — D75.839 THROMBOCYTOSIS: ICD-10-CM

## 2019-10-30 DIAGNOSIS — Z15.89 JAK-2 GENE MUTATION: ICD-10-CM

## 2019-10-30 NOTE — PROGRESS NOTES
Chief Complaint   Patient presents with    Hypertension     3 month follow-up   1. Have you been to the ER, urgent care clinic since your last visit? Hospitalized since your last visit? No    2. Have you seen or consulted any other health care providers outside of the [address] [place] [address] since your last visit? Include any pap smears or colon screening.  No   No concerns voiced

## 2019-10-30 NOTE — PROGRESS NOTES
Subjective:     Chief Complaint   Patient presents with    Hypertension     3 month follow-up        She  is a 71 y.o. female who presents for evaluation of: Son in law translates for pt. Since last appt, able to see Dr. Barbara Johnson to help with Diabetes. Recently adjusted insulin. Family not sure about sugars today but thinks sugars are improving. Has not seen Neuro or H/O. Prev saw Dr. Erman Rubinstein for thrombocytosis with abnormal Marcello 2 mutation which was a newer issue for recently. Since she has the care card, attempted to set her up with Dr. Rohith Gr in the [facility] but she never followed up. Lab review shows thrombocytosis started between 2016 and 2018. Platelets mostly stayed between 400-600 but did jump as high as 765 on 6/19/19. She also had an abnormal platelet function test that was run in relation to having a new stroke while on Plavix. She continues on aspirin and Plavix and was also put on hydroxyurea as well. Lab Results   Component Value Date/Time    Hemoglobin A1c 8.0 (H) 06/13/2019 12:07 PM    Microalbumin/Creat ratio (mg/g creat) 7 06/08/2017 02:16 PM    Microalbumin,urine random 1.03 06/08/2017 02:16 PM    LDL,Direct 119 (H) 01/25/2013 03:10 PM    LDL, calculated 61.8 06/13/2019 02:26 PM    Creatinine (POC) 0.6 08/26/2010 08:05 PM    Creatinine 1.35 (H) 06/19/2019 12:41 PM      Lab Results   Component Value Date/Time    GFR est AA 48 (L) 06/19/2019 12:41 PM    GFR est non-AA 39 (L) 06/19/2019 12:41 PM      Lab Results   Component Value Date/Time    TSH 1.520 06/13/2019 12:07 PM         ROS  Gen - no fever/chills  Resp - no dyspnea or cough  CV - no chest pain or OLIVA  Rest per HPI    Past Medical History:   Diagnosis Date    Diabetes ([address]     Hypertension     Stroke [facility])      History reviewed. No pertinent surgical history.   Current Outpatient Medications on File Prior to Visit   Medication Sig Dispense Refill    hydroxyurea (HYDREA) 500 mg capsule Take 1,000 mg by mouth daily.      insulin NPH (NOVOLIN N NPH U-100 INSULIN) 100 unit/mL injection Inject 10 units every morning and increase as directed--dispense relion brand 1 Vial 11    insulin syringe-needle U-100 0.3 mL 31 gauge x 15/64\" syrg 1 Syringe by Does Not Apply route daily. 100 Pen Needle 3    metFORMIN ER (GLUCOPHAGE XR) 500 mg tablet TAKE 1 TABLET BY MOUTH IN THE MORNING WITH BREAKFAST AND 1 TABLET WITH DINNER 60 Tab 11    metoprolol tartrate (LOPRESSOR) 50 mg tablet Take 1 Tab by mouth daily. 30 Tab 11    lisinopril-hydroCHLOROthiazide (PRINZIDE, ZESTORETIC) 20-25 mg per tablet Take 2 Tabs by mouth daily. 60 Tab 11    amLODIPine (NORVASC) 5 mg tablet Take 1 Tab by mouth daily. 30 Tab 11    aspirin delayed-release 81 mg tablet Take 1 Tab by mouth daily. 30 Tab 1     No current facility-administered medications on file prior to visit. Objective:     Vitals:    10/30/19 1048   BP: 119/68   Pulse: 72   Resp: 16   Temp: 96.8 °F (36 °C)   TempSrc: Oral   SpO2: 97%   Weight: 107 lb 12.8 oz (48.9 kg)   Height: 5' 5\" (1.651 m)     Physical Examination:  General appearance - alert, in no distress, in manual wheelchair  Eyes -sclera anicteric  Neck - supple, no significant adenopathy, no thyromegaly  Chest - clear to auscultation, no wheezes, rales or rhonchi, symmetric air entry  Heart - normal rate, regular rhythm, normal S1, S2, no murmurs, rubs, clicks or gallops  Neurological - alert, oriented, poorly verbal but more verbal today, 5-/5 strength in RUE and LLE, improved gait and balance  Extr - no edema  Psych-pleasant when engaged    Assessment/ Plan:   Diagnoses and all orders for this visit:    1. Essential hypertension with goal blood pressure less than 140/90-controlled    2. History of CVA (cerebrovascular accident)- stable with no new issues  -     HEMOGLOBIN A1C WITH EAG; Future  -     LIPID PANEL; Future    3.  Uncontrolled type 2 diabetes mellitus without complication, with long-term current use of insulin ([facility]- improving, working with Dr. Mejia Caicedo now  -     HEMOGLOBIN A1C [address]; Future  -     LIPID PANEL; Future    4. Thrombocytosis ([facility]  5. OMAR-2 gene mutation  - Platelets improving with hydroxyurea, asked patient to follow-up with Dr. Masood Griffin again  -  Continues on aspirin and Plavix also    6. Encounter for long-term (current) use of medications  -     HEMOGLOBIN A1C WITH EAG; Future  -     LIPID PANEL; Future    I have discussed the diagnosis with the patient and the intended plan as seen in the above orders. The patient has received an after-visit summary and questions were answered concerning future plans. I have discussed medication side effects and warnings with the patient as well. The patient verbalizes understanding and agreement with the plan. Follow-up and Dispositions    · Return if symptoms worsen or fail to improve.

## 2019-11-04 ENCOUNTER — HOSPITAL ENCOUNTER (EMERGENCY)
Age: 64
Discharge: HOME | End: 2019-11-04
Attending: EMERGENCY MEDICINE | Admitting: EMERGENCY MEDICINE
Payer: SUBSIDIZED

## 2019-11-04 ENCOUNTER — APPOINTMENT (OUTPATIENT)
Dept: CT IMAGING | Age: 64
End: 2019-11-04
Attending: PHYSICIAN ASSISTANT
Payer: SUBSIDIZED

## 2019-11-04 VITALS
RESPIRATION RATE: 16 BRPM | OXYGEN SATURATION: 97 % | SYSTOLIC BLOOD PRESSURE: 128 MMHG | TEMPERATURE: 97.8 F | DIASTOLIC BLOOD PRESSURE: 85 MMHG | HEART RATE: 74 BPM

## 2019-11-04 DIAGNOSIS — S09.90XA INJURY OF HEAD, INITIAL ENCOUNTER: Primary | ICD-10-CM

## 2019-11-04 LAB
GLUCOSE BLD STRIP.AUTO-MCNC: 139 MG/DL (ref 65–100)
SERVICE CMNT-IMP: ABNORMAL

## 2019-11-04 PROCEDURE — 82962 GLUCOSE BLOOD TEST: CPT

## 2019-11-04 PROCEDURE — 99283 EMERGENCY DEPT VISIT LOW MDM: CPT

## 2019-11-04 PROCEDURE — 70450 CT HEAD/BRAIN W/O DYE: CPT

## 2019-11-04 PROCEDURE — 72125 CT NECK SPINE W/O DYE: CPT

## 2019-11-04 NOTE — ED TRIAGE NOTES
She arrives with her family with a gait belt in a wheelchair. Her family member says she fell around 3pmwhen she lost her balance and hit her head on the floor. She has not complained of a headache but her family wanted to have her checked out. She vomited enroute to the ER.

## 2019-11-04 NOTE — DISCHARGE INSTRUCTIONS
Patient Education        Learning About a Closed Head Injury  What is a closed head injury? A closed head injury happens when your head gets hit hard. The strong force of the blow causes your brain to shake in your skull. This movement can cause the brain to bruise, swell, or tear. Sometimes nerves or blood vessels also get damaged. This can cause bleeding in or around the brain. A concussion is a type of closed head injury. What are the symptoms? If you have a mild concussion, you may have a mild headache or feel \"not quite right. \" These symptoms are common. They usually go away over a few days to 4 weeks. But sometimes after a concussion, you feel like you can't function as well as before the injury. And you have new symptoms. This is called postconcussive syndrome. You may:  · Find it harder to solve problems, think, concentrate, or remember. · Have headaches. · Have changes in your sleep patterns, such as not being able to sleep or sleeping all the time. · Have changes in your personality. · Not be interested in your usual activities. · Feel angry or anxious without a clear reason. · Lose your sense of taste or smell. · Be dizzy, lightheaded, or unsteady. It may be hard to stand or walk. How is a closed head injury treated? Any person who may have a concussion needs to see a doctor. Some people have to stay in the hospital to be watched. Others can go home safely. If you go home, follow your doctor's instructions. He or she will tell you if you need someone to watch you closely for the next 24 hours or longer. Rest is the best treatment. Get plenty of sleep at night. And try to rest during the day. · Avoid activities that are physically or mentally demanding. These include housework, exercise, and schoolwork. And don't play video games, send text messages, or use the computer. You may need to change your school or work schedule to be able to avoid these activities.   · Ask your doctor when it's okay to drive, ride a bike, or operate machinery. · Take an over-the-counter pain medicine, such as acetaminophen (Tylenol), ibuprofen (Advil, Motrin), or naproxen (Aleve). Be safe with medicines. Read and follow all instructions on the label. · Check with your doctor before you use any other medicines for pain. · Do not drink alcohol or use illegal drugs. They can slow recovery. They can also increase your risk of getting a second head injury. Follow-up care is a key part of your treatment and safety. Be sure to make and go to all appointments, and call your doctor if you are having problems. It's also a good idea to know your test results and keep a list of the medicines you take. Where can you learn more? Go to http://monica-kaitlin.info/. Enter E235 in the search box to learn more about \"Learning About a Closed Head Injury. \"  Current as of: March 28, 2019  Content Version: 12.2  © 2680-3792 [employer]. Care instructions adapted under license by [employer] (which disclaims liability or warranty for this information). If you have questions about a medical condition or this instruction, always ask your healthcare professional. [address] any warranty or liability for your use of this information.

## 2019-11-04 NOTE — ED PROVIDER NOTES
Lizzy Harmon is a 71 y.o. female  who presents by private vehicle to ER with c/o Patient presents with: 
Fall Head Injury Patient presents with daughter. Per daughter patient was eating dinner and her daughter stepped out of the room. She heard a loud noise and found her mother on the floor. She believes patient was attempting to get up and do the dishes. Per daughter patient hit the left side of her head. Patient had one episode of vomiting. Patient denies any other complaints. Daughter reports patient has a history of strokes. Denies being on any blood thinners. Per daughter patient is ambulatory with assistance. Per family patient is at baseline mentation. She specifically denies any fevers, chills, nausea,  chest pain, shortness of breath, headache, rash, diarrhea, abdominal pain, urinary/bowel changes, sweating or weight loss. PCP: Berto Villarreal MD  
PMHx significant for: Past Medical History: 
No date: Diabetes ([facility] No date: Hypertension No date: Stroke [facility]) PSHx significant for: History reviewed. No pertinent surgical history. Social Hx: Tobacco use: Social History Tobacco Use Smoking status: Never Smoker Smokeless tobacco: Never Used 
; EtOH use: The patient states she drinks 0 per week.; Illicit Drug use: Allergies: 
No Known Allergies There are no other complaints, changes or physical findings at this time. Past Medical History:  
Diagnosis Date  Diabetes ([facility]  Hypertension  [facility] History reviewed. No pertinent surgical history. Family History:  
Problem Relation Age of Onset  No Known Problems Mother  No Known Problems Father Social History Socioeconomic History  Marital status:  Spouse name: Not on file  Number of children: Not on file  Years of education: Not on file  Highest education level: Not on file Occupational History  Not on file Social Needs  Financial resource strain: Not on file  Food insecurity:  
  Worry: Not on file Inability: Not on file  Transportation needs:  
  Medical: Not on file Non-medical: Not on file Tobacco Use  Smoking status: Never Smoker  Smokeless tobacco: Never Used Substance and Sexual Activity  Alcohol use: No  
  Alcohol/week: 0.0 standard drinks  Drug use: No  
 Sexual activity: Not Currently Lifestyle  Physical activity:  
  Days per week: Not on file Minutes per session: Not on file  Stress: Not on file Relationships  Social connections:  
  Talks on phone: Not on file Gets together: Not on file Attends Buddhist service: Not on file Active member of club or organization: Not on file Attends meetings of clubs or organizations: Not on file Relationship status: Not on file  Intimate partner violence:  
  Fear of current or ex partner: Not on file Emotionally abused: Not on file Physically abused: Not on file Forced sexual activity: Not on file Other Topics Concern  Not on file Social History Narrative Lives in the [address] [place] [address] with her daughter, Flash Benoit, and son-in-law Jayden Booth and granddaughter, Svetlana Holguin who is 27. Originally from \[place]\"" and then moved from [place]. She has 2 other daughters and 2 sons. ALLERGIES: Patient has no known allergies. Review of Systems Constitutional: Negative for activity change, chills and fever. HENT: Negative for congestion, rhinorrhea and sore throat. Respiratory: Negative for shortness of breath. Cardiovascular: Negative for chest pain and leg swelling. Gastrointestinal: Positive for vomiting. Negative for abdominal pain, diarrhea and nausea. Genitourinary: Negative for dysuria, vaginal bleeding and vaginal discharge. Musculoskeletal: Negative for arthralgias and myalgias. Neurological: Negative for dizziness. Psychiatric/Behavioral: Negative for confusion. All other systems reviewed and are negative. Vitals:  
 11/04/19 1753 BP: 128/85 Pulse: 74 Resp: 16 Temp: 97.8 °F (36.6 °C) SpO2: 97% Physical Exam  
Constitutional: She is oriented to person, place, and time. She appears well-developed. HENT:  
Head: Normocephalic and atraumatic. Right Ear: External ear normal.  
Left Ear: External ear normal.  
Nose: Nose normal.  
Mouth/Throat: Oropharynx is clear and moist. No oropharyngeal exudate. Eyes: Conjunctivae, EOM and lids are normal. Right eye exhibits no discharge. Left eye exhibits no discharge. Neck: Normal range of motion. No tracheal deviation present. No thyromegaly present. Cardiovascular: Normal rate, regular rhythm, normal heart sounds and intact distal pulses. Pulmonary/Chest: Effort normal and breath sounds normal.  
Abdominal: Soft. Normal appearance and bowel sounds are normal.  
Musculoskeletal: Normal range of motion. Neurological: She is alert and oriented to person, place, and time. Skin: Skin is warm and dry. Psychiatric: She has a normal mood and affect. Judgment normal.  
  
 
MDM Number of Diagnoses or Management Options Injury of head, initial encounter:  
Diagnosis management comments: Assesment/Plan- 71 y.o. Patient presents with: 
Fall Head Injury 
differential includes: traumatic brain injury, concussion, minor head injury. Labs and imaging reviewed with no acute findings. Patient is well appearing, afebrile and tolerating PO. Recommend PCP follow up. Patient educated on reasons to return to the ED. Procedures

## 2019-11-05 NOTE — ED NOTES
1919: Patient verbalizes understanding of discharge instructions given by provider, Morgan Perez, [address]. Opportunity for questions provided. Patient in no apparent distress. Patient ambulatory upon discharge. Visit Vitals /85 (BP 1 Location: Left arm, BP Patient Position: Sitting) Pulse 74 Temp 97.8 °F (36.6 °C) Resp 16 SpO2 97%

## 2019-11-07 ENCOUNTER — TELEPHONE (OUTPATIENT)
Dept: FAMILY MEDICINE CLINIC | Age: 64
End: 2019-11-07

## 2019-11-07 NOTE — TELEPHONE ENCOUNTER
Pts dtr [facility] took pt to ER on 11/4/19 - she just fell out of the chair when she was eating     This morning she did the same thing at home - dizzy, weak and tired, lasted about 10 mins     She Thinks some of her medications need to start back on       Also, patient has hard stool and a stool softener and/or vitamin is requested     Best number to reach her is [phone] or her  Dulce Maria Inches at [phone]

## 2019-11-08 NOTE — TELEPHONE ENCOUNTER
Spoke with daughter and states she was seen in ER but continues to have weakness,dizziness and passing out.

## 2019-11-11 NOTE — TELEPHONE ENCOUNTER
Spoke with Daughter Vanna oJhnson and advised Dr Kennedy Huston can schedule appointment to see patient. She is unable to bring mother this week ,offered appointment next week unable to schedule will discuss with  and call back to office.

## 2019-11-11 NOTE — TELEPHONE ENCOUNTER
Please call - would be happy to see her but would also recommend family keep track of her blood sugars and blood pressures to ensure she is not dropping too low. Thanks!

## 2019-11-14 ENCOUNTER — TELEPHONE (OUTPATIENT)
Dept: FAMILY MEDICINE CLINIC | Age: 64
End: 2019-11-14

## 2019-11-15 ENCOUNTER — OFFICE VISIT (OUTPATIENT)
Dept: FAMILY MEDICINE CLINIC | Age: 64
End: 2019-11-15

## 2019-11-15 VITALS
BODY MASS INDEX: 17.83 KG/M2 | HEIGHT: 65 IN | HEART RATE: 93 BPM | OXYGEN SATURATION: 98 % | WEIGHT: 107 LBS | SYSTOLIC BLOOD PRESSURE: 128 MMHG | DIASTOLIC BLOOD PRESSURE: 67 MMHG | TEMPERATURE: 97.9 F | RESPIRATION RATE: 16 BRPM

## 2019-11-15 DIAGNOSIS — R55 SYNCOPE, UNSPECIFIED SYNCOPE TYPE: ICD-10-CM

## 2019-11-15 DIAGNOSIS — Z86.73 HISTORY OF CVA (CEREBROVASCULAR ACCIDENT): ICD-10-CM

## 2019-11-15 DIAGNOSIS — R42 DIZZINESS: Primary | ICD-10-CM

## 2019-11-15 DIAGNOSIS — R56.9 SEIZURE (HCC): ICD-10-CM

## 2019-11-15 DIAGNOSIS — D75.839 THROMBOCYTOSIS: ICD-10-CM

## 2019-11-15 DIAGNOSIS — R53.83 FATIGUE, UNSPECIFIED TYPE: ICD-10-CM

## 2019-11-15 DIAGNOSIS — R53.1 WEAKNESS: ICD-10-CM

## 2019-11-15 DIAGNOSIS — L60.0 INGROWN TOENAIL WITH INFECTION: ICD-10-CM

## 2019-11-15 DIAGNOSIS — Z15.89 JAK-2 GENE MUTATION: ICD-10-CM

## 2019-11-15 DIAGNOSIS — I63.9 CEREBROVASCULAR ACCIDENT (CVA), UNSPECIFIED MECHANISM (HCC): ICD-10-CM

## 2019-11-15 PROBLEM — E11.9 TYPE 2 DIABETES MELLITUS WITHOUT COMPLICATION (HCC): Status: ACTIVE | Noted: 2019-11-15

## 2019-11-15 RX ORDER — LEVETIRACETAM 1000 MG/1
1000 TABLET ORAL 2 TIMES DAILY
Qty: 60 TAB | Refills: 0 | Status: SHIPPED | OUTPATIENT
Start: 2019-11-15 | End: 2019-12-15

## 2019-11-15 RX ORDER — SULFAMETHOXAZOLE AND TRIMETHOPRIM 800; 160 MG/1; MG/1
1 TABLET ORAL 2 TIMES DAILY
Qty: 14 TAB | Refills: 0 | Status: SHIPPED | OUTPATIENT
Start: 2019-11-15 | End: 2019-11-22

## 2019-11-15 NOTE — PROGRESS NOTES
Subjective:     Chief Complaint   Patient presents with    Dizziness     Syncope episodes        She  is a 71 y.o. female who presents for evaluation of:  Dizziness with syncope - started having recently. Family has concerns with sugars and blood pressures but these numbers have been running okay when symptoms have occurred. Had ER eval on 11/4/19 after a syncope episode with head injury. Occurred while she was eating in the kitchen. Family thought she may have had a seizure. No labs drawn except glu. Had no UA checked at that time. Had Head CT with no new changes and was sent home. CT C spine with no concerning findings except DDD. Having vomiting recently. Pt is weak, slow to respond. Sig change in balance with more trouble getting out of a chair and unable to walk now. ROS  Gen - no fever/chills  Resp - no dyspnea or cough  CV - no chest pain or OLIVA  Rest per HPI    Past Medical History:   Diagnosis Date    Diabetes ([facility]     Hypertension     Stroke [facility])      History reviewed. No pertinent surgical history. Current Outpatient Medications on File Prior to Visit   Medication Sig Dispense Refill    hydroxyurea (HYDREA) 500 mg capsule Take 1,000 mg by mouth daily.  insulin NPH (NOVOLIN N NPH U-100 INSULIN) 100 unit/mL injection Inject 10 units every morning and increase as directed--dispense relion brand 1 Vial 11    insulin syringe-needle U-100 0.3 mL 31 gauge x 15/64\" syrg 1 Syringe by Does Not Apply route daily. 100 Pen Needle 3    metFORMIN ER (GLUCOPHAGE XR) 500 mg tablet TAKE 1 TABLET BY MOUTH IN THE MORNING WITH BREAKFAST AND 1 TABLET WITH DINNER 60 Tab 11    metoprolol tartrate (LOPRESSOR) 50 mg tablet Take 1 Tab by mouth daily. 30 Tab 11    lisinopril-hydroCHLOROthiazide (PRINZIDE, ZESTORETIC) 20-25 mg per tablet Take 2 Tabs by mouth daily. 60 Tab 11    amLODIPine (NORVASC) 5 mg tablet Take 1 Tab by mouth daily.  30 Tab 11    aspirin delayed-release 81 mg tablet Take 1 Tab by mouth daily. 30 Tab 1    [DISCONTINUED] levETIRAcetam 1,000 mg tablet Take 1 Tab by mouth two (2) times a day for 30 days. 60 Tab 0     No current facility-administered medications on file prior to visit. Objective:     Vitals:    11/15/19 1039   BP: 128/67   Pulse: 93   Resp: 16   Temp: 97.9 °F (36.6 °C)   TempSrc: Oral   SpO2: 98%   Weight: 107 lb (48.5 kg)   Height: 5' 5\" (1.651 m)     Physical Examination:  General appearance - alert, poorly verbal, and in no distress  Eyes -sclera anicteric  Neck - supple, no significant adenopathy, no thyromegaly, no bruits  Chest - clear to auscultation, no wheezes, rales or rhonchi, symmetric air entry  Heart - normal rate, regular rhythm, normal S1, S2, no murmurs, rubs, clicks or gallops  Neurological - alert, poorly verbal, in wheelchair, slightly decreased strength and RUE and LLE  Extremities-no edema  Psych- pleasant, normal mood and affect    Assessment/ Plan:   Diagnoses and all orders for this visit:    1. Dizziness-concerning picture and getting labs. Sending to neurology again  -     METABOLIC PANEL, COMPREHENSIVE  -     CBC W/O DIFF  -     REFERRAL TO NEUROLOGY    2. Syncope, unspecified syncope type-as above  -     METABOLIC PANEL, COMPREHENSIVE  -     CBC W/O DIFF  -     REFERRAL TO NEUROLOGY  -     MRI BRAIN WO CONT; Future    3. History of CVA (cerebrovascular accident)- as above  -     REFERRAL TO NEUROLOGY  -     MRI BRAIN WO CONT; Future    4. Thrombocytosis (HCC)-continue hydroxyurea and getting labs and MRI  -     CBC W/O DIFF  -     REFERRAL TO NEUROLOGY  -     MRI BRAIN WO CONT; Future    5. OMAR-2 gene mutation-as above  -     REFERRAL TO NEUROLOGY    6. Terry Share she may have gastroenteritis type picture  -     METABOLIC PANEL, COMPREHENSIVE  -     CBC W/O DIFF    7.  Ingrown toenail with infection-low priority issue so will use Bactrim to treat infection and will follow-up after this  -     trimethoprim-sulfamethoxazole (BACTRIM DS, SEPTRA DS) 160-800 mg per tablet; Take 1 Tab by mouth two (2) times a day for 7 days. 8. Fatigue, unspecified type-as above, checking labs  -     METABOLIC PANEL, COMPREHENSIVE  -     CBC W/O DIFF    9. Seizure (HCC)-refilling Keppra, getting MRI, and sending back to neurology  -     REFERRAL TO NEUROLOGY  -     levETIRAcetam 1,000 mg tablet; Take 1 Tab by mouth two (2) times a day for 30 days.  -     MRI BRAIN WO CONT; Future    10. Cerebrovascular accident (CVA), unspecified mechanism ([facility]  -     MRI BRAIN WO CONT; Future     I have discussed the diagnosis with the patient and the intended plan as seen in the above orders. The patient has received an after-visit summary and questions were answered concerning future plans. I have discussed medication side effects and warnings with the patient as well. The patient verbalizes understanding and agreement with the plan. Follow-up and Dispositions    · Return in about 4 weeks (around 12/13/2019), or if symptoms worsen or fail to improve.

## 2019-11-15 NOTE — PROGRESS NOTES
Chief Complaint   Patient presents with    Dizziness     Syncope episodes   1. Have you been to the ER, urgent care clinic since your last visit? Hospitalized since your last visit? Yes Where: [facility] ER    2. Have you seen or consulted any other health care providers outside of the [address] [place] [address] since your last visit? Include any pap smears or colon screening.  No   Discuss weakness,slow to respond and fatigue  Right foot big toe draining and started having vomiting since yesterday

## 2019-11-16 LAB
ALBUMIN SERPL-MCNC: 4.2 G/DL (ref 3.6–4.8)
ALBUMIN/GLOB SERPL: 1.2 {RATIO} (ref 1.2–2.2)
ALP SERPL-CCNC: 89 IU/L (ref 39–117)
ALT SERPL-CCNC: 12 IU/L (ref 0–32)
AST SERPL-CCNC: 15 IU/L (ref 0–40)
BILIRUB SERPL-MCNC: 0.3 MG/DL (ref 0–1.2)
BUN SERPL-MCNC: 13 MG/DL (ref 8–27)
BUN/CREAT SERPL: 13 (ref 12–28)
CALCIUM SERPL-MCNC: 9.5 MG/DL (ref 8.7–10.3)
CHLORIDE SERPL-SCNC: 91 MMOL/L (ref 96–106)
CO2 SERPL-SCNC: 25 MMOL/L (ref 20–29)
CREAT SERPL-MCNC: 1.04 MG/DL (ref 0.57–1)
ERYTHROCYTE [DISTWIDTH] IN BLOOD BY AUTOMATED COUNT: 14.3 % (ref 12.3–15.4)
GLOBULIN SER CALC-MCNC: 3.4 G/DL (ref 1.5–4.5)
GLUCOSE SERPL-MCNC: 152 MG/DL (ref 65–99)
HCT VFR BLD AUTO: 37 % (ref 34–46.6)
HGB BLD-MCNC: 13.3 G/DL (ref 11.1–15.9)
INTERPRETATION: NORMAL
MCH RBC QN AUTO: 31.7 PG (ref 26.6–33)
MCHC RBC AUTO-ENTMCNC: 35.9 G/DL (ref 31.5–35.7)
MCV RBC AUTO: 88 FL (ref 79–97)
PLATELET # BLD AUTO: 327 X10E3/UL (ref 150–450)
POTASSIUM SERPL-SCNC: 4.4 MMOL/L (ref 3.5–5.2)
PROT SERPL-MCNC: 7.6 G/DL (ref 6–8.5)
RBC # BLD AUTO: 4.2 X10E6/UL (ref 3.77–5.28)
SODIUM SERPL-SCNC: 134 MMOL/L (ref 134–144)
WBC # BLD AUTO: 6 X10E3/UL (ref 3.4–10.8)

## 2019-11-22 ENCOUNTER — TELEPHONE (OUTPATIENT)
Dept: ENDOCRINOLOGY | Age: 64
End: 2019-11-22

## 2019-11-22 ENCOUNTER — TELEPHONE (OUTPATIENT)
Dept: NEUROLOGY | Age: 64
End: 2019-11-22

## 2019-11-22 NOTE — TELEPHONE ENCOUNTER
[facility] f/u of Dr. Micky Monteiro. Message from Dia below. ----- Message from Morgan Hair sent at 11/22/2019  9:37 AM EST -----  Regarding: Evelia Sanchez MD/Telephone  General Message/Vendor Calls    Caller's first and last name:  Shante Grant     Reason for call: Pt's son-in law, Shante Grant called on behalf of the pt to schedule a sooner new pt appt. He stated that the pt suffered a stroke last year, since the stroke pt has been experiencing the following symptoms: weakness, confusion and lack of a appetite; also she will be considered a diabetic. No sooner appt's were available however, pt was scheduled for the next available appt and advised him to inform the pt to seek appropriate medical if needed.        Callback required yes/no and why: Yes       Best contact number(s):[phone]      Details to clarify the request: N/A

## 2019-11-22 NOTE — TELEPHONE ENCOUNTER
I spoke with Suzie and she stated that she has not given her mom any insulin due to her sugars have been low. She stated that her mom is very weak, not eating and barely talking. She stated that they saw Dr. Darryl Peoples on the 16th however her mom was not this bad. She stated after her moms stroke she did not talk much anyway however she is worse.  Please advise        [birth year]79 11/ 11/ 11/ 11/22- 103

## 2019-11-22 NOTE — TELEPHONE ENCOUNTER
----- Message from Gina Cavanaugh sent at 11/22/2019 12:33 PM EST -----  Regarding: /Telephone  General Message/Vendor Calls    Caller's first and last name: Ilya Ball      Reason for call: when to give pt insulin      Callback required yes/no and why: yes      Best contact number(s): [phone]      Details to clarify the request: Pt daughter called requesting a call back from the nurse in regards to pt blood sugar level is 100 and pt is weak  and would like to know when to withhold insulin and when to give to the pt.        Gina Cavanaugh

## 2019-11-22 NOTE — TELEPHONE ENCOUNTER
Please let her know as long as her sugar is under 150, she won't need to take any insulin, but if it's over 150, she can give her mother just 8 units in the morning.

## 2019-11-25 ENCOUNTER — HOSPITAL ENCOUNTER (OUTPATIENT)
Dept: MRI IMAGING | Age: 64
Discharge: HOME | End: 2019-11-25
Attending: FAMILY MEDICINE
Payer: SUBSIDIZED

## 2019-11-25 DIAGNOSIS — Z86.73 HISTORY OF CVA (CEREBROVASCULAR ACCIDENT): ICD-10-CM

## 2019-11-25 DIAGNOSIS — R55 SYNCOPE, UNSPECIFIED SYNCOPE TYPE: ICD-10-CM

## 2019-11-25 DIAGNOSIS — I63.9 CEREBROVASCULAR ACCIDENT (CVA), UNSPECIFIED MECHANISM (HCC): ICD-10-CM

## 2019-11-25 DIAGNOSIS — D75.839 THROMBOCYTOSIS: ICD-10-CM

## 2019-11-25 DIAGNOSIS — R56.9 SEIZURE (HCC): ICD-10-CM

## 2019-11-25 PROCEDURE — 70551 MRI BRAIN STEM W/O DYE: CPT

## 2019-11-26 DIAGNOSIS — Z79.899 ENCOUNTER FOR LONG-TERM (CURRENT) USE OF MEDICATIONS: ICD-10-CM

## 2019-11-26 DIAGNOSIS — Z86.73 HISTORY OF CVA (CEREBROVASCULAR ACCIDENT): ICD-10-CM

## 2019-11-26 NOTE — TELEPHONE ENCOUNTER
Called pt son in law  Mr. Alejandro Traylor, [place] left unclear of what is needed from nursing standpoint. The earliest available  appointment was given for Jan 16, 2020, which was  request for the stated symptoms per Alejandro Traylor  He stated that the pt suffered a stroke last year, since the stroke pt has been experiencing the following symptoms: weakness, confusion and lack of a appetite; also she will be considered a diabetic. No sooner appt's were available however, pt was scheduled for the next available appt and advised him to inform the pt to seek appropriate medical if needed. Son in law was also advised ot seek medical advise as needed in the interim. Asked to call back if he has unresolved issues prior to appointment.   ELVIS

## 2019-11-29 ENCOUNTER — OFFICE VISIT (OUTPATIENT)
Dept: ENDOCRINOLOGY | Age: 64
End: 2019-11-29

## 2019-11-29 VITALS
DIASTOLIC BLOOD PRESSURE: 60 MMHG | HEIGHT: 65 IN | WEIGHT: 98 LBS | BODY MASS INDEX: 16.33 KG/M2 | HEART RATE: 92 BPM | SYSTOLIC BLOOD PRESSURE: 95 MMHG

## 2019-11-29 DIAGNOSIS — I10 ESSENTIAL HYPERTENSION WITH GOAL BLOOD PRESSURE LESS THAN 140/90: ICD-10-CM

## 2019-11-29 DIAGNOSIS — E11.21 TYPE 2 DIABETES WITH NEPHROPATHY (HCC): Primary | ICD-10-CM

## 2019-11-29 DIAGNOSIS — E78.5 HYPERLIPIDEMIA LDL GOAL <70: ICD-10-CM

## 2019-11-29 LAB — HBA1C MFR BLD HPLC: 8.3 %

## 2019-11-29 RX ORDER — AMLODIPINE BESYLATE 5 MG/1
TABLET ORAL
Qty: 30 TAB | Refills: 11
Start: 2019-11-29 | End: 2020-01-01 | Stop reason: SDUPTHER

## 2019-11-29 NOTE — PROGRESS NOTES
Chief Complaint Patient presents with  Diabetes History of Present Illness: Joshua Carney is a 71 y.o. female here for follow up of diabetes. Weight down 9 lbs from visit with Dr. Richi Mcclure on 11/15/19. Her daughter, Maurice Buerger, had been giving her 10 units of NPH every morning after her last visit with me in addition to the metformin 1 tab bid but her appetite has not been as good and she started having sugars in the  range for the 5 days prior to calling us on 11/22/19. At that time, I recommended only taking 8 units of NPH if her BS is over 150 and has just had 2 readings of 156 on 11/26 and 11/27 for which she received insulin but other 5 readings were all in the 100-150 range. She has not taken any BP meds today but has had more dizziness while taking the 3 meds that are prescribed. Just had an MRI on 11/25/19 that didn't show any new changes. Current Outpatient Medications Medication Sig  
 insulin NPH (NOVOLIN N NPH U-100 INSULIN) 100 unit/mL injection Inject 8 units every morning only for bs > 150--dispense relion brand--Dose change 11/22/19--updated med list--did not send prescription to the pharmacy  levETIRAcetam 1,000 mg tablet Take 1 Tab by mouth two (2) times a day for 30 days.  hydroxyurea (HYDREA) 500 mg capsule Take 1,000 mg by mouth daily.  metFORMIN ER (GLUCOPHAGE XR) 500 mg tablet TAKE 1 TABLET BY MOUTH IN THE MORNING WITH BREAKFAST AND 1 TABLET WITH DINNER  
 metoprolol tartrate (LOPRESSOR) 50 mg tablet Take 1 Tab by mouth daily.  lisinopril-hydroCHLOROthiazide (PRINZIDE, ZESTORETIC) 20-25 mg per tablet Take 2 Tabs by mouth daily.  amLODIPine (NORVASC) 5 mg tablet Take 1 Tab by mouth daily.  aspirin delayed-release 81 mg tablet Take 1 Tab by mouth daily.  insulin syringe-needle U-100 0.3 mL 31 gauge x 15/64\" syrg 1 Syringe by Does Not Apply route daily. No current facility-administered medications for this visit. No Known Allergies Review of Systems: - Eyes: no blurry vision or double vision - Cardiovascular: no chest pain - Respiratory: no shortness of breath - Musculoskeletal: no myalgias - Neurological: no numbness/tingling in extremities Physical Examination: 
Blood pressure 95/60, pulse 92, height 5' 5\" (1.651 m), weight 98 lb (44.5 kg). - General: pleasant, no distress, good eye contact  
- Neck: no carotid bruits - Cardiovascular: regular, normal rate, nl s1 and s2, no m/r/g,  
- Respiratory: clear bilaterally - Integumentary: no edema,  
- Psychiatric: normal mood and affect Diabetic foot exam:  
 
Left Foot: 
 Visual Exam: callous - mild Pulse DP: 2+ (normal) Filament test: reduced sensation Vibratory sensation: diminished Right Foot: 
 Visual Exam: callous - mild Pulse DP: 2+ (normal) Filament test: reduced sensation Vibratory sensation: diminished Data Reviewed:  
Component Latest Ref Rng & Units 11/29/2019 11/15/2019  
 
     12:35 PM 12:16 PM  
Glucose 65 - 99 mg/dL  152 (H) BUN 
    8 - 27 mg/dL  13 Creatinine 
    0.57 - 1.00 mg/dL  1.04 (H) GFR est non-AA 
    >59 mL/min/1.73  57 (L) GFR est AA 
    >59 mL/min/1.73  65 BUN/Creatinine ratio 12 - 28  13 Sodium 134 - 144 mmol/L  134 Potassium 3.5 - 5.2 mmol/L  4.4 Chloride 96 - 106 mmol/L  91 (L)  
CO2 
    20 - 29 mmol/L  25 Calcium 8.7 - 10.3 mg/dL  9.5 Protein, total 
    6.0 - 8.5 g/dL  7.6 Albumin 3.6 - 4.8 g/dL  4.2 GLOBULIN, TOTAL 
    1.5 - 4.5 g/dL  3.4 A-G Ratio 1.2 - 2.2  1.2 Bilirubin, total 
    0.0 - 1.2 mg/dL  0.3 Alk. phosphatase 39 - 117 IU/L  89 AST 
    0 - 40 IU/L  15  
ALT (SGPT) 0 - 32 IU/L  12 Hemoglobin A1c (POC) 
    % 8.3 Assessment/Plan: 1.  Type 2 diabetes with nephropathy [facility]): her most recent Hgb A1c was 8.3% in 11/19 up from 8% in 6/19, which is the lowest it's been since 2010 with all values between 8.4-14.9%. Given the language barrier, I want to keep her diabetes regimen as simple as possible and given she is without insurance, I want to keep her cost down so the NPH is working well. Given her lack of appetite will only use NPH for bs > 150 
- cont metformin  mg 1 tab bid 
- cont NPH 8 units every morning only for bs > 150 
- check blood sugar once daily 
- foot exam done 11/19 
- check A1c at next visit if not done at Dr. Sanju Pham office 2. Essential hypertension with goal blood pressure less than 140/90: her BP was at goal < 140/90 but on hypotensive side and this is without meds so will stop her metoprolol and lis-hctz and only use amlodipine prn BP > 140/90. 
- stop metoprolol 50 mg daily 
- cont amlodipine 5 mg prn BP > 140/90 
- stop lisinopril-hctz 20/25 mg 2 tabs daily 
- monitor home blood pressure readings bid 3. Hyperlipidemia LDL goal <70: LDL 61 in 6/19. Not currently on a statin but given history of stroke will likely benefit from one in the future. - check lipids at next visit if not done by Dr. Paulina Gamez Patient Instructions 1) Only give insulin if her sugar is over 150, then give 8 units, otherwise no insulin. Keep taking metformin 1 tab in the morning and evening. 2) Stop the 3 blood pressure pills (metoprolol, lisinopril-hctz, and amlodipine). Check blood pressure twice a day in the morning and evening. If the reading is over 140 on the top number or 90 on the bottom number, then give one dose of 5 mg of amlodipine. If under 140/90, then no amlodipine. 3) We'll see if her blood pressure is doing better on less medication and if this helps with dizziness. 4) Your MRI did not show any new changes since June. 5) Her Hemoglobin A1c (3 month test of blood sugar) was 8.3% and goal is under 8% so you are not far from goal but her recent blood sugars over the past 2-3 weeks are much better. Follow-up and Dispositions · Return in about 4 months (around 3/29/2020).  
  
 
 
 
 
 
Copy sent to: 
Vaughn Thornton MD

## 2019-11-29 NOTE — PATIENT INSTRUCTIONS
1) Only give insulin if her sugar is over 150, then give 8 units, otherwise no insulin. Keep taking metformin 1 tab in the morning and evening. 2) Stop the 3 blood pressure pills (metoprolol, lisinopril-hctz, and amlodipine). Check blood pressure twice a day in the morning and evening. If the reading is over 140 on the top number or 90 on the bottom number, then give one dose of 5 mg of amlodipine. If under 140/90, then no amlodipine. 3) We'll see if her blood pressure is doing better on less medication and if this helps with dizziness. 4) Your MRI did not show any new changes since June. 5) Her Hemoglobin A1c (3 month test of blood sugar) was 8.3% and goal is under 8% so you are not far from goal but her recent blood sugars over the past 2-3 weeks are much better.

## 2020-01-01 ENCOUNTER — VIRTUAL VISIT (OUTPATIENT)
Dept: ENDOCRINOLOGY | Age: 65
End: 2020-01-01

## 2020-01-01 ENCOUNTER — TELEPHONE (OUTPATIENT)
Dept: FAMILY MEDICINE CLINIC | Age: 65
End: 2020-01-01

## 2020-01-01 ENCOUNTER — OFFICE VISIT (OUTPATIENT)
Dept: ENDOCRINOLOGY | Age: 65
End: 2020-01-01

## 2020-01-01 ENCOUNTER — VIRTUAL VISIT (OUTPATIENT)
Dept: FAMILY MEDICINE CLINIC | Age: 65
End: 2020-01-01

## 2020-01-01 DIAGNOSIS — I10 ESSENTIAL HYPERTENSION WITH GOAL BLOOD PRESSURE LESS THAN 140/90: ICD-10-CM

## 2020-01-01 DIAGNOSIS — D75.839 THROMBOCYTOSIS: ICD-10-CM

## 2020-01-01 DIAGNOSIS — Z86.73 HISTORY OF CVA (CEREBROVASCULAR ACCIDENT): ICD-10-CM

## 2020-01-01 DIAGNOSIS — I10 ESSENTIAL HYPERTENSION WITH GOAL BLOOD PRESSURE LESS THAN 140/90: Primary | ICD-10-CM

## 2020-01-01 DIAGNOSIS — E11.21 TYPE 2 DIABETES WITH NEPHROPATHY (HCC): ICD-10-CM

## 2020-01-01 DIAGNOSIS — E11.21 TYPE 2 DIABETES WITH NEPHROPATHY (HCC): Primary | ICD-10-CM

## 2020-01-01 DIAGNOSIS — Z79.899 ENCOUNTER FOR LONG-TERM (CURRENT) USE OF MEDICATIONS: ICD-10-CM

## 2020-01-01 DIAGNOSIS — E78.5 HYPERLIPIDEMIA LDL GOAL <70: ICD-10-CM

## 2020-01-01 DIAGNOSIS — I63.511 CEREBRAL INFARCTION DUE TO STENOSIS OF RIGHT MIDDLE CEREBRAL ARTERY (HCC): ICD-10-CM

## 2020-01-01 RX ORDER — AMLODIPINE BESYLATE 5 MG/1
TABLET ORAL
Qty: 90 TAB | Refills: 3 | Status: SHIPPED | OUTPATIENT
Start: 2020-01-01

## 2020-01-01 RX ORDER — METOPROLOL TARTRATE 50 MG/1
TABLET ORAL
Qty: 90 TAB | Refills: 3 | Status: SHIPPED | OUTPATIENT
Start: 2020-01-01

## 2020-01-01 RX ORDER — METFORMIN HYDROCHLORIDE 500 MG/1
TABLET, EXTENDED RELEASE ORAL
Qty: 180 TAB | Refills: 3 | Status: SHIPPED | OUTPATIENT
Start: 2020-01-01

## 2020-01-01 RX ORDER — METOPROLOL TARTRATE 50 MG/1
TABLET ORAL
COMMUNITY
Start: 2020-01-01 | End: 2020-01-01 | Stop reason: SDUPTHER

## 2020-01-01 RX ORDER — METOPROLOL TARTRATE 50 MG/1
TABLET ORAL
Qty: 90 TAB | Refills: 3 | Status: SHIPPED | OUTPATIENT
Start: 2020-01-01 | End: 2020-01-01 | Stop reason: SDUPTHER

## 2020-01-01 RX ORDER — AMLODIPINE BESYLATE 5 MG/1
TABLET ORAL
Qty: 90 TAB | Refills: 3 | Status: SHIPPED | OUTPATIENT
Start: 2020-01-01 | End: 2020-01-01 | Stop reason: SDUPTHER

## 2020-01-01 RX ORDER — GLIPIZIDE 5 MG/1
TABLET ORAL
Qty: 90 TAB | Refills: 3 | Status: SHIPPED | OUTPATIENT
Start: 2020-01-01

## 2020-03-31 NOTE — PROGRESS NOTES
Chief Complaint   Patient presents with    Diabetes     PCP and Pharmacy verified    Other     Doxy visit via laptop. (daughter's email on file)       **THIS IS A VIRTUAL VISIT VIA A VIDEO SYNCHRONOUS DISCUSSION. PATIENT AGREED TO HAVE THEIR CARE DELIVERED OVER A DOXY. ME VIDEO VISIT IN PLACE OF THEIR REGULARLY SCHEDULED OFFICE VISIT**    History of Present Illness: Kathya Burnham is a 71 y.o. female here for follow up of diabetes. Her daughter, Karishma Wan, works at [address] and has been careful with her exposure during the COVID-19 outbreak. She states her blood sugar has been doing well and has only gone over 150 twice the past few months to require taking 8 units of NPH. Otherwise the metformin 1 tab bid is controlling her sugars. She misunderstood my instructions and stopped the lis-hctz but has been taking both the amlodipine and the metoprolol on a prn basis for BP readings > 150/90 and this can happen several times during the week. She has been off the hydoxurea after refills ran out. She is waiting for another carecard approval.  Her son may want to take her back to her home country once COVID settles down. Current Outpatient Medications   Medication Sig    metoprolol tartrate (LOPRESSOR) 50 mg tablet TAKE 1 TABLET BY MOUTH ONCE DAILY    amLODIPine (NORVASC) 5 mg tablet Take 1 tab daily only if BP over 140/90--Dose change 11/29/19--updated med list--did not send prescription to the pharmacy    insulin NPH (NOVOLIN N NPH U-100 INSULIN) 100 unit/mL injection Inject 8 units every morning only for bs > 150--dispense relion brand--Dose change 11/22/19--updated med list--did not send prescription to the pharmacy    insulin syringe-needle U-100 0.3 mL 31 gauge x 15/64\" syrg 1 Syringe by Does Not Apply route daily.  metFORMIN ER (GLUCOPHAGE XR) 500 mg tablet TAKE 1 TABLET BY MOUTH IN THE MORNING WITH BREAKFAST AND 1 TABLET WITH DINNER    aspirin delayed-release 81 mg tablet Take 1 Tab by mouth daily. No current facility-administered medications for this visit. No Known Allergies     Review of Systems: PER HPI    Physical Examination:  - GENERAL: NCAT, Appears well nourished   - EYES: EOMI, non-icteric, no proptosis   - Ear/Nose/Throat: NCAT, no visible inflammation or masses   - CARDIOVASCULAR: no cyanosis, no visible JVD   - RESPIRATORY: respiratory effort normal without any distress or labored breathing   - MUSCULOSKELETAL: Normal ROM of neck and upper extremities observed   - SKIN: No rash on face  - NEUROLOGIC:  No facial asymmetry (Cranial nerve 7 motor function), No gaze palsy   - PSYCHIATRIC: Normal affect, Normal insight and judgement       Data Reviewed:   - none new for review    Assessment/Plan:     1. Type 2 diabetes with nephropathy [facility]): her most recent Hgb A1c was 8.3% in 11/19 up from 8% in 6/19, which is the lowest it's been since 2010 with all values between 8.4-14.9%. Given the language barrier, I want to keep her diabetes regimen as simple as possible and given she is without insurance, I want to keep her cost down so the NPH on a prn basis for bs > 150  - cont metformin  mg 1 tab bid  - cont NPH 8 units every morning only for bs > 150  - check blood sugar once daily  - foot exam done 11/19  - check A1c at next visit if not done at Dr. Gerhardt Roa office      2. Essential hypertension with goal blood pressure less than 140/90: her BP has been running higher since stopping al meds so will have her add back just the amlodipine on a daily basis and take the metoprolol 50 mg as needed for BP > 140/90  - cont amlodipine 5 mg daily  - monitor home blood pressure readings bid      3. Hyperlipidemia LDL goal <70: LDL 61 in 6/19. Not currently on a statin but given history of stroke will likely benefit from one in the future. - check lipids at next visit if not done by Dr. Kaylie Bell        Patient Instructions   1) Keep taking metformin 1 tab twice daily.   If her blood sugar is over 150, then give a dos of 8 units of NPH but otherwise do not take insulin. 2) Take amlodipine 5 mg everyday for blood pressure. 3) Check blood pressure twice daily in the morning and afternoon and if her blood pressure is over 140 on the top number or 90 on the bottome number, you can give a dose of metoprolol 50 mg.    4) I sent a year of refills of these 3 meds to her pharmacy. 5) Please come for a follow up visit on 10/5/20 at 2:10pm (not 1:30pm as I had already given this away--sorry) in our [place] office.       Follow-up and Dispositions    · Return for 10/5/20 at 2:10pm.               Copy sent to:  Aye Valdez MD

## 2020-03-31 NOTE — PATIENT INSTRUCTIONS
1) Keep taking metformin 1 tab twice daily. If her blood sugar is over 150, then give a dos of 8 units of NPH but otherwise do not take insulin. 2) Take amlodipine 5 mg everyday for blood pressure. 3) Check blood pressure twice daily in the morning and afternoon and if her blood pressure is over 140 on the top number or 90 on the bottome number, you can give a dose of metoprolol 50 mg.    4) I sent a year of refills of these 3 meds to her pharmacy. 5) Please come for a follow up visit on 10/5/20 at 2:10pm (not 1:30pm as I had already given this away--sorry) in our [place] office.

## 2020-06-05 NOTE — PROGRESS NOTES
Chief Complaint Patient presents with  Diabetes  
  pcp and pharmacy confirmed **THIS IS A VIRTUAL VISIT VIA A VIDEO SYNCHRONOUS DISCUSSION. PATIENT AGREED TO HAVE THEIR CARE DELIVERED OVER A FACETIME VISIT IN PLACE OF THEIR REGULARLY SCHEDULED OFFICE VISIT** History of Present Illness: Felicia Cortez is a 76 y.o. female here for follow up of diabetes. Her daughter is helping with translation as she does not speak any english. Since last visit in March, her blood sugars have remained under 150 everyday aside from a few times and her daughter gave her NPH when this happened. Her BP has sometimes been over 140/90 and when this happens, her daughter will give her the metoprolol and then her BP will come back down under 140/90. She has been getting the amlodipine 5 mg daily. She is planning on going to [facility] on 6/30/20 and won't be back until at least Jan 2021 so her daughter told me to cancel her appointment in October. Current Outpatient Medications Medication Sig  
 amLODIPine (NORVASC) 5 mg tablet Take 1 tab daily  metoprolol tartrate (LOPRESSOR) 50 mg tablet TAKE 1 TABLET BY MOUTH ONCE DAILY ONLY IF BP OVER 140/90  
 metFORMIN ER (GLUCOPHAGE XR) 500 mg tablet TAKE 1 TABLET BY MOUTH IN THE MORNING WITH BREAKFAST AND 1 TABLET WITH DINNER  
 insulin NPH (NOVOLIN N NPH U-100 INSULIN) 100 unit/mL injection Inject 8 units every morning only for bs > 150--dispense relion brand--Dose change 11/22/19--updated med list--did not send prescription to the pharmacy  insulin syringe-needle U-100 0.3 mL 31 gauge x 15/64\" syrg 1 Syringe by Does Not Apply route daily.  aspirin delayed-release 81 mg tablet Take 1 Tab by mouth daily. No current facility-administered medications for this visit. No Known Allergies Review of Systems: PER HPI Physical Examination: 
- GENERAL: NCAT, Appears well nourished  
- EYES: EOMI, non-icteric, no proptosis - Ear/Nose/Throat: NCAT, no visible inflammation or masses - CARDIOVASCULAR: no cyanosis, no visible JVD  
- RESPIRATORY: respiratory effort normal without any distress or labored breathing - MUSCULOSKELETAL: Normal ROM of neck and upper extremities observed  
- SKIN: No rash on face 
- NEUROLOGIC:  No facial asymmetry (Cranial nerve 7 motor function), No gaze palsy  
- PSYCHIATRIC: Normal affect, Normal insight and judgement Data Reviewed:  
- none new for review Assessment/Plan: 1. Type 2 diabetes with nephropathy [facility]): her most recent Hgb A1c was 8.3% in 11/19 up from 8% in 6/19, which is the lowest it's been since 2010 with all values between 8.4-14.9%. Given the language barrier, I want to keep her diabetes regimen as simple as possible and given she is without insurance, I want to keep her cost down. Since [place] will be [place] [place] [facility] [place], will not use insulin any longer and just use glipizide as needed for blood sugar over 200. 
- cont metformin  mg 1 tab bid 
- stop NPH 8 units every morning only for bs > 150 
- begin glipizide 5 mg 1 tab as needed for bs > 200 
- check blood sugar once daily 
- foot exam done 11/19 
- check A1c at next visit if not done at Dr. Zbigniew Durand office 2. Essential hypertension with goal blood pressure less than 140/90:  
- cont amlodipine 5 mg daily 
- cont metoprolol 50 mg as needed for BP > 140/90 
- monitor home blood pressure readings bid 3. Hyperlipidemia LDL goal <70: LDL 61 in 6/19. Not currently on a statin but given history of stroke will likely benefit from one in the future. - check lipids at next visit if not done by Dr. Matilda Dumont Patient Instructions 1) Continue taking metformin 1 tab twice daily. 2) Stop the NPH insulin. 3) If your blood sugar is over 200, you can take 1 tab of glipizide 5 mg to bring down your sugar. This will be ready for  at the pharmacy today. 4) Continue taking amlodipine 5 mg everyday for blood pressure. 5) Check blood pressure twice daily in the morning and afternoon and if her blood pressure is over 140 on the top number or 90 on the bottom number, you can give a dose of metoprolol 50 mg. 
 
6) I will cancel your appointment for October. Please call the office at [phone] when you are back in the [address] [facility] and we can reschedule your visit. Follow-up and Dispositions · Return for when she returns from [facility].  
  
 
 
 
 
 
Copy sent to: 
Jonathon Canales MD

## 2020-06-05 NOTE — PATIENT INSTRUCTIONS
1) Continue taking metformin 1 tab twice daily. 2) Stop the NPH insulin. 3) If your blood sugar is over 200, you can take 1 tab of glipizide 5 mg to bring down your sugar. This will be ready for  at the pharmacy today. 4) Continue taking amlodipine 5 mg everyday for blood pressure. 5) Check blood pressure twice daily in the morning and afternoon and if her blood pressure is over 140 on the top number or 90 on the bottom number, you can give a dose of metoprolol 50 mg. 
 
6) I will cancel your appointment for October. Please call the office at [phone] when you are back in the [address] [facility] and we can reschedule your visit.

## 2020-06-05 NOTE — LETTER
6/5/2020 1:33 PM 
 
Ms. Hermilo Valladares [address] [place] [phone] 1) Continue taking metformin 1 tab twice daily. 2) Stop the NPH insulin. 3) If your blood sugar is over 200, you can take 1 tab of glipizide 5 mg to bring down your sugar. This will be ready for  at the pharmacy today. 4) Continue taking amlodipine 5 mg everyday for blood pressure. 5) Check blood pressure twice daily in the morning and afternoon and if her blood pressure is over 140 on the top number or 90 on the bottom number, you can give a dose of metoprolol 50 mg. 
 
6) I will cancel your appointment for October. Please call the office at [phone] when you are back in the [address] and we can reschedule your visit. 7) This is the current list of medications: 
 
Current Outpatient Medications Medication Sig  
 glipiZIDE (GLUCOTROL) 5 mg tablet Take 1 tab once daily as needed for blood sugar over 200  
 amLODIPine (NORVASC) 5 mg tablet Take 1 tab daily  metoprolol tartrate (LOPRESSOR) 50 mg tablet TAKE 1 TABLET BY MOUTH ONCE DAILY ONLY IF BP OVER 140/90  
 metFORMIN ER (GLUCOPHAGE XR) 500 mg tablet TAKE 1 TABLET BY MOUTH IN THE MORNING WITH BREAKFAST AND 1 TABLET WITH DINNER  
 aspirin delayed-release 81 mg tablet Take 1 Tab by mouth daily. No current facility-administered medications for this visit. If you have any questions, please don't hesitate to call me at [phone].  
 
Sincerely, 
 
 
 
Eva Ramirez MD

## 2020-06-12 NOTE — Clinical Note
pls call for appt. Let them know I have ordered new labs to review prior to her flying out if possible. Would like to see her when she returns.  vee

## 2020-06-12 NOTE — LETTER
6/12/2020 12:16 PM 
 
Ms. Paxton Parisi [address] [place] [phone] To Whom It May Concern: 
 
Paxton Parisi is currently under the care of [facility]. She is planning to travel on 6/30/2020 with her son-in-law. This letter is to certify that she has the following medical conditions: 1. Hypertensioncontrolled 2. History of stroke had cerebral infarction due to stenosis of the right MCA 3. Diabetes mellitus type 2 with nephropathy sugars significantly improved with last A1c of 8.3% 4. History of seizurethought to be due to reactivation of right MCA encephalomalacia. No longer taking any seizure medication for this If there are questions or concerns please have the patient contact our office.  
 
 
 
Sincerely, 
 
 
Siir Kirby MD

## 2020-06-12 NOTE — PROGRESS NOTES
Hermilo Valladares is a 76 y.o. female who was seen by synchronous (real-time) audio-video technology on 6/12/2020. Consent: Hermilo Valladares, who was seen by synchronous (real-time) audio-video technology, and/or her healthcare decision maker, is aware that this patient-initiated, Telehealth encounter on 6/12/2020 is a billable service, with coverage as determined by her insurance carrier. She is aware that she may receive a bill and has provided verbal consent to proceed: Yes. Assessment & Plan:  
Diagnoses and all orders for this visit: 1. Essential hypertension with goal blood pressure less than 140/90 - has been controlled, would like to get home BPs to review 
-     metoprolol tartrate (LOPRESSOR) 50 mg tablet; TAKE 1 TABLET BY MOUTH ONCE DAILY ONLY IF BP OVER 140/90 
-     amLODIPine (NORVASC) 5 mg tablet; Take 1 tab daily -     METABOLIC PANEL, COMPREHENSIVE 2. History of CVA (cerebrovascular accident) - stable with no new concerns 
-     HEMOGLOBIN A1C WITH EAG 
-     LIPID PANEL 3. Thrombocytosis ([address] - improved on last labs, rechecking labs -     CBC W/O DIFF 4. Type 2 diabetes with nephropathy (HCC) - has improved significantly, working with Dr. Evangelista Mccoy on this 
-     HEMOGLOBIN A1C WITH EAG 
-     METABOLIC PANEL, COMPREHENSIVE 
-     LIPID PANEL 
-     TSH 3RD GENERATION 5. Cerebral infarction due to stenosis of right middle cerebral artery (HCC) 
-     HEMOGLOBIN A1C WITH EAG 
-     LIPID PANEL 6. Encounter for long-term (current) use of medications 
-     HEMOGLOBIN A1C WITH EAG 
-     METABOLIC PANEL, COMPREHENSIVE 
-     LIPID PANEL 
-     TSH 3RD GENERATION 
-     CBC W/O DIFF Discussed getting COVID testing prior to flying. Planning to recheck labs. Need to monitor weight as well given low weight. Follow-up and Dispositions · Return in about 3 months (around 9/12/2020). Subjective: Scott Diehl is a 76 y.o. female who was seen for Follow-up (6 month) She is doing well today. She plans to fly to her [place] ([address] [facility]) by way of [place] on 6/30 with her son in law. She recently had a visit with Dr. Bria Leary and sugars have been much improved from the past.  Most recent A1C was 8.3% back in 11/2019. Has had much better compliance and most sugars are controlled. Hyperlipidemia & HTN Pt is doing well on current meds with no medication side effects noted No new myalgias, no joint pains, no weakness Hx of CVA Exercising - Minimal 
Dieting - Yes Smoking - No 
  
Lab Results Component Value Date/Time Cholesterol, total 115 06/13/2019 02:26 PM  
 HDL Cholesterol 33 06/13/2019 02:26 PM  
 LDL,Direct 119 (H) 01/25/2013 03:10 PM  
 LDL, calculated 61.8 06/13/2019 02:26 PM  
 Triglyceride 101 06/13/2019 02:26 PM  
 
 
MRI brain 11/2019 \"IMPRESSION: No acute changes. Prominent atrophy, remote ischemia vascular distribution right middle cerebral artery. \" Prior to Admission medications Medication Sig Start Date End Date Taking? Authorizing Provider  
insulin regular (NovoLIN R Regular U-100 Insuln) 100 unit/mL injection by SubCUTAneous route. Take 8 units BS over 150 and 10 units over 200   Yes Provider, Historical  
metoprolol tartrate (LOPRESSOR) 50 mg tablet TAKE 1 TABLET BY MOUTH ONCE DAILY ONLY IF BP OVER 140/90 6/12/20  Yes Rama Armendariz MD  
amLODIPine (NORVASC) 5 mg tablet Take 1 tab daily 6/12/20  Yes Rama Armendariz MD  
glipiZIDE (GLUCOTROL) 5 mg tablet Take 1 tab once daily as needed for blood sugar over 200 6/5/20  Yes Mireya Gore MD  
metFORMIN ER (GLUCOPHAGE XR) 500 mg tablet TAKE 1 TABLET BY MOUTH IN THE MORNING WITH BREAKFAST AND 1 TABLET WITH DINNER 3/31/20  Yes Mireya Gore MD  
aspirin delayed-release 81 mg tablet Take 1 Tab by mouth daily.  5/9/18  Yes Tammy Fernandez MD  
 amLODIPine (NORVASC) 5 mg tablet Take 1 tab daily 3/31/20 6/12/20  Magdalena Atkinson MD  
metoprolol tartrate (LOPRESSOR) 50 mg tablet TAKE 1 TABLET BY MOUTH ONCE DAILY ONLY IF BP OVER 140/90 3/31/20 6/12/20  Magdalena Atkinson MD  
 
No Known Allergies Patient Active Problem List  
 Diagnosis Date Noted  Type 2 diabetes mellitus without complication ([facility] 43/92/6042  Slurred speech 01/27/2019  Difficulty walking 01/27/2019  Type 2 diabetes with nephropathy ([address] 05/11/2018  Seizure ([address] 05/06/2018  Essential hypertension with goal blood pressure less than 140/90 09/13/2016  Osteoarthritis of right knee 08/28/2014  Illiteracy 02/04/2014  Dyspepsia 02/04/2014  Diabetes mellitus type 2, controlled ([address] 02/18/2013 Past Medical History:  
Diagnosis Date  Diabetes ([facility] [facility]  Hypertension  Stroke ([facility] [address] [facility] ROS Gen - no fever/chills Resp - no dyspnea or cough CV - no chest pain or OLIVA Rest per HPI Objective:  
Vital Signs: (As obtained by patient/caregiver at home) There were no vitals taken for this visit. Physical exam: 
General appearance - alert, well appearing, and in no distress, pleasant Eyes -sclera anicteric, no discharge HEENT normocephalic, atraumatic, moist mucous membranes, no visualized neck mass Chest -normal respiratory effort, no visualized signs of respiratory distress Neurological - alert, awake, normal speech, no focal findings or movement disorder noted Psych - normal mood and affect Skin no apparent lesions We discussed the expected course, resolution and complications of the diagnosis(es) in detail. Medication risks, benefits, costs, interactions, and alternatives were discussed as indicated. I advised her to contact the office if her condition worsens, changes or fails to improve as anticipated. She expressed understanding with the diagnosis(es) and plan. Darlin Jarvis is a 76 y.o. female who was evaluated by a video visit encounter for concerns as above. Patient identification was verified prior to start of the visit. A caregiver was present when appropriate. Due to this being a TeleHealth encounter (During UGQHE-09 public health emergency), evaluation of the following organ systems was limited: Vitals/Constitutional/EENT/Resp/CV/GI//MS/Neuro/Skin/Heme-Lymph-Imm. Pursuant to the emergency declaration under the [facility], 1135 waiver authority and the [employer] Act, this Virtual  Visit was conducted, with patient's (and/or legal guardian's) consent, to reduce the patient's risk of exposure to COVID-19 and provide necessary medical care. Services were provided through a video synchronous discussion virtually to substitute for in-person clinic visit. Patient and provider were located at their individual homes.  
 
 
Miguel Alcaraz MD

## 2020-08-10 NOTE — TELEPHONE ENCOUNTER
Son -in-law needs letter to [id number] [address],[place] [address] stating patient is unable to travel to [place] to have passport renewal. It expires Sept. She was unable to travel back due to flights being cancelled.

## 2021-01-14 ENCOUNTER — TELEPHONE (OUTPATIENT)
Dept: ENDOCRINOLOGY | Age: 66
End: 2021-01-14

## 2021-01-14 NOTE — TELEPHONE ENCOUNTER
Pt's daughter, Matty Daugherty called to let you that her mother passed away on 12/19/20. Per her daughter she passed away in [place].

## 2021-06-26 NOTE — TELEPHONE ENCOUNTER
----- Message from Alma Cristobal sent at 11/14/2019 11:33 AM EST -----  Regarding: Dr. Shaylee Darden: Son-in-law, Dulce Maria Inches  Reason for call: PT got sick on way to appt, had to go back home to change and is running late. APPT 11/14 11:30am, at 12:30pm  Callback requested: YES  Best contact: [phone]  Additional details: tried to transfer, no answer- nurse stated it is okay for her to still come in.
Left message to call office prior to coming to office.
No

## 2024-01-01 NOTE — PROGRESS NOTES
Per verbal order from Dr Ivy Barrett four glucose chewable tables given to patient, BS 60 at 1529. Cris Saunders completed screening documentation with help of her family member who was present at time of visit. No contraindications for administering today's ordered vaccines. Vaccine Immunization Statement(s) given and instructions for adverse reaction. No adverse reaction noted at time of discharge, explained possible s/e. Reviewed symptoms indicating need to be seen in ER. Patient given ppv23 vaccine; denies fever. Pt had no adverse reaction at time of d/c. Vaccine administration documented into [place] Immunization Information System. [facility] appt given for 7/27/17. Explained process that pt would need to have a  due to eye dilation, the eye exam fee of $15.   Pt given copy of eye appt slip. -'s hands and feet may be bluish in color for a few days.

## 2024-02-02 NOTE — TELEPHONE ENCOUNTER
Adrian Zhu called about PAP Levemir for this patient. Her son claims her on his taxes as his income is then used, the patient is likely to be over income as the son earns $83,000. Won Peter is going to submit the application, she thinks it will be denied. Pt cannot apply for Lantus as their requirement is that  the patient  apply for Medicaid, which she cannot as she is not a citizen. Won Peter is letting us know as we may not be successful with this application. She said she thinks we may be able to get Humulin N, R or Humalog. If Mirna Prime is a viable medication she says the patient may be able to get that. No